# Patient Record
Sex: FEMALE | Race: WHITE | NOT HISPANIC OR LATINO
[De-identification: names, ages, dates, MRNs, and addresses within clinical notes are randomized per-mention and may not be internally consistent; named-entity substitution may affect disease eponyms.]

---

## 2017-11-14 ENCOUNTER — APPOINTMENT (OUTPATIENT)
Dept: FAMILY MEDICINE | Facility: CLINIC | Age: 57
End: 2017-11-14
Payer: COMMERCIAL

## 2017-11-14 VITALS
RESPIRATION RATE: 16 BRPM | HEART RATE: 66 BPM | BODY MASS INDEX: 34.1 KG/M2 | WEIGHT: 225 LBS | HEIGHT: 68 IN | OXYGEN SATURATION: 98 % | SYSTOLIC BLOOD PRESSURE: 152 MMHG | DIASTOLIC BLOOD PRESSURE: 90 MMHG

## 2017-11-14 VITALS — DIASTOLIC BLOOD PRESSURE: 80 MMHG | SYSTOLIC BLOOD PRESSURE: 138 MMHG

## 2017-11-14 DIAGNOSIS — Z82.3 FAMILY HISTORY OF STROKE: ICD-10-CM

## 2017-11-14 DIAGNOSIS — Z82.49 FAMILY HISTORY OF ISCHEMIC HEART DISEASE AND OTHER DISEASES OF THE CIRCULATORY SYSTEM: ICD-10-CM

## 2017-11-14 DIAGNOSIS — Z92.89 PERSONAL HISTORY OF OTHER MEDICAL TREATMENT: ICD-10-CM

## 2017-11-14 DIAGNOSIS — Z83.79 FAMILY HISTORY OF OTHER DISEASES OF THE DIGESTIVE SYSTEM: ICD-10-CM

## 2017-11-14 LAB — CYTOLOGY CVX/VAG DOC THIN PREP: NEGATIVE

## 2017-11-14 PROCEDURE — 99213 OFFICE O/P EST LOW 20 MIN: CPT

## 2017-11-20 LAB
HPV MRNA E6/E7: NOT DETECTED
SUREPATH (TM) PAP: NORMAL

## 2018-02-20 ENCOUNTER — APPOINTMENT (OUTPATIENT)
Dept: FAMILY MEDICINE | Facility: CLINIC | Age: 58
End: 2018-02-20
Payer: COMMERCIAL

## 2018-02-20 VITALS
BODY MASS INDEX: 35.16 KG/M2 | SYSTOLIC BLOOD PRESSURE: 138 MMHG | HEART RATE: 58 BPM | RESPIRATION RATE: 16 BRPM | HEIGHT: 68 IN | WEIGHT: 232 LBS | DIASTOLIC BLOOD PRESSURE: 90 MMHG | OXYGEN SATURATION: 98 %

## 2018-02-20 VITALS — DIASTOLIC BLOOD PRESSURE: 72 MMHG | SYSTOLIC BLOOD PRESSURE: 128 MMHG

## 2018-02-20 PROCEDURE — 99213 OFFICE O/P EST LOW 20 MIN: CPT

## 2018-04-20 ENCOUNTER — APPOINTMENT (OUTPATIENT)
Dept: FAMILY MEDICINE | Facility: CLINIC | Age: 58
End: 2018-04-20
Payer: COMMERCIAL

## 2018-04-20 VITALS
BODY MASS INDEX: 33.71 KG/M2 | HEIGHT: 68.5 IN | HEART RATE: 72 BPM | OXYGEN SATURATION: 98 % | TEMPERATURE: 99 F | SYSTOLIC BLOOD PRESSURE: 118 MMHG | DIASTOLIC BLOOD PRESSURE: 78 MMHG | RESPIRATION RATE: 16 BRPM | WEIGHT: 225 LBS

## 2018-04-20 PROCEDURE — 90471 IMMUNIZATION ADMIN: CPT

## 2018-04-20 PROCEDURE — 90715 TDAP VACCINE 7 YRS/> IM: CPT

## 2018-04-20 RX ORDER — LOSARTAN POTASSIUM 100 MG/1
100 TABLET, FILM COATED ORAL
Qty: 30 | Refills: 0 | Status: COMPLETED | COMMUNITY
Start: 2018-02-20

## 2018-04-20 RX ORDER — BISOPROLOL FUMARATE AND HYDROCHLOROTHIAZIDE 10; 6.25 MG/1; MG/1
10-6.25 TABLET, COATED ORAL
Refills: 0 | Status: COMPLETED | COMMUNITY
End: 2018-04-20

## 2018-07-28 ENCOUNTER — RX RENEWAL (OUTPATIENT)
Age: 58
End: 2018-07-28

## 2018-07-28 ENCOUNTER — APPOINTMENT (OUTPATIENT)
Dept: FAMILY MEDICINE | Facility: CLINIC | Age: 58
End: 2018-07-28
Payer: COMMERCIAL

## 2018-07-28 VITALS
OXYGEN SATURATION: 98 % | HEART RATE: 58 BPM | BODY MASS INDEX: 33.71 KG/M2 | SYSTOLIC BLOOD PRESSURE: 116 MMHG | WEIGHT: 225 LBS | DIASTOLIC BLOOD PRESSURE: 76 MMHG | HEIGHT: 68.5 IN

## 2018-07-28 VITALS — SYSTOLIC BLOOD PRESSURE: 138 MMHG | DIASTOLIC BLOOD PRESSURE: 70 MMHG

## 2018-07-28 PROCEDURE — 99213 OFFICE O/P EST LOW 20 MIN: CPT

## 2018-07-28 RX ORDER — BISOPROLOL FUMARATE AND HYDROCHLOROTHIAZIDE 10; 6.25 MG/1; MG/1
10-6.25 TABLET, FILM COATED ORAL DAILY
Qty: 30 | Refills: 2 | Status: DISCONTINUED | COMMUNITY
Start: 2018-02-20 | End: 2018-07-28

## 2018-07-28 NOTE — HISTORY OF PRESENT ILLNESS
[FreeTextEntry1] : med check [de-identified] : She is feeling very well.  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

## 2018-07-28 NOTE — ASSESSMENT
[FreeTextEntry1] : her hypertension is stable, she will continue current meds, healthy diet and exercise, rx's given for labs, mammogram and screening colonoscopy, she will follow up in 3 months or sooner prn

## 2018-07-30 ENCOUNTER — RESULT REVIEW (OUTPATIENT)
Age: 58
End: 2018-07-30

## 2018-07-31 ENCOUNTER — RESULT REVIEW (OUTPATIENT)
Age: 58
End: 2018-07-31

## 2018-08-28 ENCOUNTER — RESULT CHARGE (OUTPATIENT)
Age: 58
End: 2018-08-28

## 2018-08-29 ENCOUNTER — NON-APPOINTMENT (OUTPATIENT)
Age: 58
End: 2018-08-29

## 2018-08-29 ENCOUNTER — APPOINTMENT (OUTPATIENT)
Dept: FAMILY MEDICINE | Facility: CLINIC | Age: 58
End: 2018-08-29
Payer: COMMERCIAL

## 2018-08-29 VITALS
HEIGHT: 66 IN | WEIGHT: 231 LBS | TEMPERATURE: 98.3 F | OXYGEN SATURATION: 98 % | SYSTOLIC BLOOD PRESSURE: 138 MMHG | DIASTOLIC BLOOD PRESSURE: 84 MMHG | HEART RATE: 60 BPM | RESPIRATION RATE: 16 BRPM | BODY MASS INDEX: 37.12 KG/M2

## 2018-08-29 LAB
BILIRUB UR QL STRIP: NORMAL
CLARITY UR: CLEAR
COLLECTION METHOD: NORMAL
GLUCOSE UR-MCNC: NORMAL
HCG UR QL: 0.2 EU/DL
HGB UR QL STRIP.AUTO: NORMAL
KETONES UR-MCNC: NORMAL
LEUKOCYTE ESTERASE UR QL STRIP: NORMAL
NITRITE UR QL STRIP: NORMAL
PH UR STRIP: 5.5
PROT UR STRIP-MCNC: NORMAL
SP GR UR STRIP: 1.01

## 2018-08-29 PROCEDURE — 81003 URINALYSIS AUTO W/O SCOPE: CPT | Mod: QW

## 2018-08-29 PROCEDURE — 99396 PREV VISIT EST AGE 40-64: CPT | Mod: 25

## 2018-08-29 PROCEDURE — 93000 ELECTROCARDIOGRAM COMPLETE: CPT

## 2018-08-29 NOTE — HEALTH RISK ASSESSMENT
[Very Good] : ~his/her~ current health as very good [Excellent] : ~his/her~  mood as  excellent [No falls in past year] : Patient reported no falls in the past year [0] : 2) Feeling down, depressed, or hopeless: Not at all (0) [Patient reported mammogram was normal] : Patient reported mammogram was normal [HIV test declined] : HIV test declined [Hepatitis C test declined] : Hepatitis C test declined [None] : None [With Significant Other] : lives with significant other [Employed] : employed [Single] : single [Feels Safe at Home] : Feels safe at home [Fully functional (bathing, dressing, toileting, transferring, walking, feeding)] : Fully functional (bathing, dressing, toileting, transferring, walking, feeding) [Fully functional (using the telephone, shopping, preparing meals, housekeeping, doing laundry, using] : Fully functional and needs no help or supervision to perform IADLs (using the telephone, shopping, preparing meals, housekeeping, doing laundry, using transportation, managing medications and managing finances) [Smoke Detector] : smoke detector [Carbon Monoxide Detector] : carbon monoxide detector [Seat Belt] :  uses seat belt [Sunscreen] : uses sunscreen [Discussed at today's visit] : Advance Directives Discussed at today's visit [] : No [[id number]] : 0 [Change in mental status noted] : No change in mental status noted [Language] : denies difficulty with language [Behavior] : denies difficulty with behavior [Learning/Retaining New Information] : denies difficulty learning/retaining new information [Handling Complex Tasks] : denies difficulty handling complex tasks [Reasoning] : denies difficulty with reasoning [Spatial Ability and Orientation] : denies difficulty with spatial ability and orientation [Reports changes in hearing] : Reports no changes in hearing [Reports changes in vision] : Reports no changes in vision [Reports changes in dental health] : Reports no changes in dental health [Guns at Home] : no guns at home [MammogramDate] : Aug2018 [PapSmearDate] : Aug2017 [ColonoscopyDate] : Aug2018

## 2018-08-29 NOTE — ASSESSMENT
[FreeTextEntry1] : UA/EKG reviewed with pt.  Diet/exercise reviewed.  All meds are UTD. She had lab work done and that was reviewed. Rx for BMD- she had a colonoscopy yesterday.

## 2018-08-29 NOTE — PHYSICAL EXAM
[No Acute Distress] : no acute distress [Well-Appearing] : well-appearing [Normal Sclera/Conjunctiva] : normal sclera/conjunctiva [PERRL] : pupils equal round and reactive to light [EOMI] : extraocular movements intact [Normal Outer Ear/Nose] : the outer ears and nose were normal in appearance [Normal Oropharynx] : the oropharynx was normal [No JVD] : no jugular venous distention [Supple] : supple [No Lymphadenopathy] : no lymphadenopathy [Thyroid Normal, No Nodules] : the thyroid was normal and there were no nodules present [No Respiratory Distress] : no respiratory distress  [Clear to Auscultation] : lungs were clear to auscultation bilaterally [Normal Rate] : normal rate  [Regular Rhythm] : with a regular rhythm [Normal S1, S2] : normal S1 and S2 [No Murmur] : no murmur heard [No Carotid Bruits] : no carotid bruits [No Varicosities] : no varicosities [Pedal Pulses Present] : the pedal pulses are present [No Edema] : there was no peripheral edema [No Extremity Clubbing/Cyanosis] : no extremity clubbing/cyanosis [Soft] : abdomen soft [Non Tender] : non-tender [Non-distended] : non-distended [No Masses] : no abdominal mass palpated [No HSM] : no HSM [Normal Bowel Sounds] : normal bowel sounds [Normal Posterior Cervical Nodes] : no posterior cervical lymphadenopathy [Normal Anterior Cervical Nodes] : no anterior cervical lymphadenopathy [No CVA Tenderness] : no CVA  tenderness [No Spinal Tenderness] : no spinal tenderness [No Joint Swelling] : no joint swelling [Grossly Normal Strength/Tone] : grossly normal strength/tone [No Rash] : no rash [Normal Gait] : normal gait [Coordination Grossly Intact] : coordination grossly intact [No Focal Deficits] : no focal deficits [Deep Tendon Reflexes (DTR)] : deep tendon reflexes were 2+ and symmetric [Speech Grossly Normal] : speech grossly normal [Memory Grossly Normal] : memory grossly normal [Normal Affect] : the affect was normal [Alert and Oriented x3] : oriented to person, place, and time [Normal Mood] : the mood was normal [Normal Insight/Judgement] : insight and judgment were intact [de-identified] : overweight

## 2018-10-15 ENCOUNTER — APPOINTMENT (OUTPATIENT)
Dept: FAMILY MEDICINE | Facility: CLINIC | Age: 58
End: 2018-10-15
Payer: COMMERCIAL

## 2018-10-15 VITALS
DIASTOLIC BLOOD PRESSURE: 82 MMHG | HEART RATE: 64 BPM | HEIGHT: 66 IN | BODY MASS INDEX: 36.8 KG/M2 | RESPIRATION RATE: 16 BRPM | WEIGHT: 229 LBS | OXYGEN SATURATION: 98 % | SYSTOLIC BLOOD PRESSURE: 128 MMHG

## 2018-10-15 DIAGNOSIS — Z23 ENCOUNTER FOR IMMUNIZATION: ICD-10-CM

## 2018-10-15 DIAGNOSIS — Z12.11 ENCOUNTER FOR SCREENING FOR MALIGNANT NEOPLASM OF COLON: ICD-10-CM

## 2018-10-15 DIAGNOSIS — R94.5 ABNORMAL RESULTS OF LIVER FUNCTION STUDIES: ICD-10-CM

## 2018-10-15 PROCEDURE — 99213 OFFICE O/P EST LOW 20 MIN: CPT

## 2018-10-15 NOTE — ASSESSMENT
[FreeTextEntry1] : no medication changes were made, she was advised to apply ice alternating with heat to her chest and to follow up if symptoms persist and in 3 months for a BP check

## 2018-10-15 NOTE — PHYSICAL EXAM
[No Acute Distress] : no acute distress [Well Nourished] : well nourished [Well Developed] : well developed [Well-Appearing] : well-appearing [Normal Voice/Communication] : normal voice/communication [No Respiratory Distress] : no respiratory distress  [Clear to Auscultation] : lungs were clear to auscultation bilaterally [No Accessory Muscle Use] : no accessory muscle use [Normal Rate] : normal rate  [Regular Rhythm] : with a regular rhythm [Normal S1, S2] : normal S1 and S2 [No Murmur] : no murmur heard [Normal Gait] : normal gait [Speech Grossly Normal] : speech grossly normal [Memory Grossly Normal] : memory grossly normal [Normal Affect] : the affect was normal [Normal Mood] : the mood was normal [Normal Insight/Judgement] : insight and judgment were intact [de-identified] : tenderness on palpation of right costochondral area

## 2018-10-15 NOTE — HISTORY OF PRESENT ILLNESS
[FreeTextEntry1] : Pt presents for bp check [de-identified] : She just closed on her new house and will move in soon.  She is feeling well except for some discomfort in the right side of her chest and it radiates to the right arm when she moves the arm.

## 2019-01-15 ENCOUNTER — APPOINTMENT (OUTPATIENT)
Dept: FAMILY MEDICINE | Facility: CLINIC | Age: 59
End: 2019-01-15
Payer: COMMERCIAL

## 2019-01-15 VITALS — SYSTOLIC BLOOD PRESSURE: 126 MMHG | DIASTOLIC BLOOD PRESSURE: 66 MMHG

## 2019-01-15 VITALS
HEIGHT: 66 IN | DIASTOLIC BLOOD PRESSURE: 72 MMHG | HEART RATE: 76 BPM | SYSTOLIC BLOOD PRESSURE: 130 MMHG | OXYGEN SATURATION: 98 % | BODY MASS INDEX: 36.8 KG/M2 | WEIGHT: 229 LBS | RESPIRATION RATE: 16 BRPM

## 2019-01-15 DIAGNOSIS — S29.011A STRAIN OF MUSCLE AND TENDON OF FRONT WALL OF THORAX, INITIAL ENCOUNTER: ICD-10-CM

## 2019-01-15 PROCEDURE — 99213 OFFICE O/P EST LOW 20 MIN: CPT

## 2019-01-15 NOTE — HISTORY OF PRESENT ILLNESS
[FreeTextEntry1] : Patient present for 3 month follow up and med renewal  [de-identified] : She had been having tingling in the thumb, index and middle fingers of both hands especially at night.  She has been eating healthy and walking for exercising.  She has a neurology follow up appointment tomorrow with Dr Bolden

## 2019-01-15 NOTE — PHYSICAL EXAM
[No Acute Distress] : no acute distress [Well Nourished] : well nourished [Well Developed] : well developed [Well-Appearing] : well-appearing [Normal Voice/Communication] : normal voice/communication [No Respiratory Distress] : no respiratory distress  [Clear to Auscultation] : lungs were clear to auscultation bilaterally [No Accessory Muscle Use] : no accessory muscle use [Normal Rate] : normal rate  [Regular Rhythm] : with a regular rhythm [Normal S1, S2] : normal S1 and S2 [Speech Grossly Normal] : speech grossly normal [Memory Grossly Normal] : memory grossly normal [Normal Mood] : the mood was normal [Normal Insight/Judgement] : insight and judgment were intact

## 2019-03-05 ENCOUNTER — RX RENEWAL (OUTPATIENT)
Age: 59
End: 2019-03-05

## 2019-04-02 ENCOUNTER — APPOINTMENT (OUTPATIENT)
Dept: FAMILY MEDICINE | Facility: CLINIC | Age: 59
End: 2019-04-02
Payer: COMMERCIAL

## 2019-04-02 VITALS
HEIGHT: 66 IN | OXYGEN SATURATION: 98 % | BODY MASS INDEX: 36.96 KG/M2 | DIASTOLIC BLOOD PRESSURE: 80 MMHG | RESPIRATION RATE: 16 BRPM | WEIGHT: 230 LBS | SYSTOLIC BLOOD PRESSURE: 124 MMHG | HEART RATE: 66 BPM

## 2019-04-02 PROCEDURE — 99396 PREV VISIT EST AGE 40-64: CPT | Mod: 25

## 2019-04-02 NOTE — PHYSICAL EXAM
[No Acute Distress] : no acute distress [Well Nourished] : well nourished [Well Developed] : well developed [Well-Appearing] : well-appearing [Normal Voice/Communication] : normal voice/communication [Normal Appearance] : normal in appearance [No Masses] : no palpable masses [No Nipple Discharge] : no nipple discharge [Urethral Meatus] : normal urethra [Urinary Bladder Findings] : the bladder was normal on palpation [External Female Genitalia] : normal external genitalia [Vagina] : normal vaginal exam [Cervix] : normal cervix [Uterus] : uterus was normal size, without masses or tenderness [Uterine Adnexae] : normal adnexa

## 2019-04-02 NOTE — PLAN
[FreeTextEntry1] : PAP sent to labs, she will follow up for a wellness visit and check labs prior, she was given rx's for a dexa and mammo

## 2019-04-06 LAB — CYTOLOGY CVX/VAG DOC THIN PREP: NORMAL

## 2019-06-28 ENCOUNTER — APPOINTMENT (OUTPATIENT)
Dept: FAMILY MEDICINE | Facility: CLINIC | Age: 59
End: 2019-06-28
Payer: COMMERCIAL

## 2019-06-28 VITALS — DIASTOLIC BLOOD PRESSURE: 80 MMHG | SYSTOLIC BLOOD PRESSURE: 140 MMHG

## 2019-06-28 VITALS
HEART RATE: 64 BPM | OXYGEN SATURATION: 98 % | DIASTOLIC BLOOD PRESSURE: 100 MMHG | BODY MASS INDEX: 36.32 KG/M2 | RESPIRATION RATE: 14 BRPM | WEIGHT: 226 LBS | HEIGHT: 66 IN | SYSTOLIC BLOOD PRESSURE: 160 MMHG

## 2019-06-28 PROCEDURE — 99213 OFFICE O/P EST LOW 20 MIN: CPT

## 2019-06-28 NOTE — PLAN
[FreeTextEntry1] : she was advised to apply cool compresses and to take the steroid pack as prescribed and to follow up with dermatology if no improvement in 10-14 days

## 2019-06-28 NOTE — PHYSICAL EXAM
[No Acute Distress] : no acute distress [Well Nourished] : well nourished [Well Developed] : well developed [Well-Appearing] : well-appearing [Normal Voice/Communication] : normal voice/communication [de-identified] : pink, slightly raised areas on left upper and lower eyelids, no vesicles seen [de-identified] : she is anxious

## 2019-06-28 NOTE — HISTORY OF PRESENT ILLNESS
[FreeTextEntry8] : pr c/o rash around the eye, +crusty in the morning , +itchy X 3 weeks.  She had her eyebrows waxed about a month ago and 1 week later developed an itchy rash around the left eye and it is coming and going since.  She is applying cool compresses and tried vitamin E cream. She is under a lot of stress, her boyfriend has a problem with alcohol and she just had to call the police to come to the house and now she is under increased financial stress and her dog is sick.

## 2019-08-22 ENCOUNTER — RESULT REVIEW (OUTPATIENT)
Age: 59
End: 2019-08-22

## 2019-09-18 ENCOUNTER — APPOINTMENT (OUTPATIENT)
Dept: FAMILY MEDICINE | Facility: CLINIC | Age: 59
End: 2019-09-18
Payer: COMMERCIAL

## 2019-09-18 ENCOUNTER — NON-APPOINTMENT (OUTPATIENT)
Age: 59
End: 2019-09-18

## 2019-09-18 VITALS
RESPIRATION RATE: 14 BRPM | DIASTOLIC BLOOD PRESSURE: 78 MMHG | WEIGHT: 226 LBS | OXYGEN SATURATION: 98 % | HEART RATE: 62 BPM | HEIGHT: 66 IN | BODY MASS INDEX: 36.32 KG/M2 | SYSTOLIC BLOOD PRESSURE: 140 MMHG

## 2019-09-18 VITALS — DIASTOLIC BLOOD PRESSURE: 76 MMHG | SYSTOLIC BLOOD PRESSURE: 136 MMHG

## 2019-09-18 DIAGNOSIS — H02.9 UNSPECIFIED DISORDER OF EYELID: ICD-10-CM

## 2019-09-18 LAB
BILIRUB UR QL STRIP: NEGATIVE
CLARITY UR: NORMAL
COLLECTION METHOD: NORMAL
GLUCOSE UR-MCNC: NEGATIVE
HCG UR QL: 0.2 EU/DL
HGB UR QL STRIP.AUTO: NORMAL
KETONES UR-MCNC: NEGATIVE
LEUKOCYTE ESTERASE UR QL STRIP: NORMAL
NITRITE UR QL STRIP: NEGATIVE
PH UR STRIP: 5
PROT UR STRIP-MCNC: NEGATIVE
SP GR UR STRIP: 1.02

## 2019-09-18 PROCEDURE — 81003 URINALYSIS AUTO W/O SCOPE: CPT | Mod: NC,QW

## 2019-09-18 PROCEDURE — 99396 PREV VISIT EST AGE 40-64: CPT | Mod: 25

## 2019-09-18 PROCEDURE — 93000 ELECTROCARDIOGRAM COMPLETE: CPT

## 2019-09-18 RX ORDER — METHYLPREDNISOLONE 4 MG/1
4 TABLET ORAL
Qty: 1 | Refills: 0 | Status: DISCONTINUED | COMMUNITY
Start: 2019-06-28 | End: 2019-09-18

## 2019-09-18 NOTE — PHYSICAL EXAM
[No Acute Distress] : no acute distress [Well Nourished] : well nourished [Well Developed] : well developed [Well-Appearing] : well-appearing [Normal Voice/Communication] : normal voice/communication [Normal Sclera/Conjunctiva] : normal sclera/conjunctiva [Normal Outer Ear/Nose] : the outer ears and nose were normal in appearance [Normal TMs] : both tympanic membranes were normal [Normal Oropharynx] : the oropharynx was normal [No Lymphadenopathy] : no lymphadenopathy [Supple] : supple [Thyroid Normal, No Nodules] : the thyroid was normal and there were no nodules present [No Respiratory Distress] : no respiratory distress  [No Accessory Muscle Use] : no accessory muscle use [Clear to Auscultation] : lungs were clear to auscultation bilaterally [Normal Rate] : normal rate  [Regular Rhythm] : with a regular rhythm [Normal S1, S2] : normal S1 and S2 [No Murmur] : no murmur heard [No Carotid Bruits] : no carotid bruits [No Edema] : there was no peripheral edema [Soft] : abdomen soft [Non-distended] : non-distended [Non Tender] : non-tender [No HSM] : no HSM [Normal Bowel Sounds] : normal bowel sounds [Memory Grossly Normal] : memory grossly normal [Speech Grossly Normal] : speech grossly normal [Normal Mood] : the mood was normal [Normal Affect] : the affect was normal [Normal Insight/Judgement] : insight and judgment were intact

## 2019-09-18 NOTE — HISTORY OF PRESENT ILLNESS
[FreeTextEntry1] : Patient present for physical\par  [de-identified] : She received a series of steroid injections in both wrists for carpal tunnel syndrome and she is on a course of prednisone.  She denies any burning with urination but sometimes her urine has a foul odor.

## 2019-09-18 NOTE — PLAN
[FreeTextEntry1] : she was given new rx's for her labs and her dexa scan, her urine will be sent to the lab for a U/A with micro and C&S

## 2019-09-18 NOTE — HEALTH RISK ASSESSMENT
[Very Good] : ~his/her~  mood as very good [0-5] : 0-5 [Never (0 pts)] : Never (0 points) [No] : In the past 12 months have you used drugs other than those required for medical reasons? No [No falls in past year] : Patient reported no falls in the past year [Hepatitis C test declined] : Hepatitis C test declined [HIV test declined] : HIV test declined [None] : None [Alone] : lives alone [Employed] : employed [] :  [Fully functional (bathing, dressing, toileting, transferring, walking, feeding)] : Fully functional (bathing, dressing, toileting, transferring, walking, feeding) [Feels Safe at Home] : Feels safe at home [Fully functional (using the telephone, shopping, preparing meals, housekeeping, doing laundry, using] : Fully functional and needs no help or supervision to perform IADLs (using the telephone, shopping, preparing meals, housekeeping, doing laundry, using transportation, managing medications and managing finances) [Reports normal functional visual acuity (ie: able to read med bottle)] : Reports normal functional visual acuity [Smoke Detector] : smoke detector [Seat Belt] :  uses seat belt [Safety elements used in home] : safety elements used in home [Sunscreen] : uses sunscreen [Patient/Caregiver not ready to engage] : Patient/Caregiver not ready to engage [FreeTextEntry1] : none [] : No [de-identified] : none [de-identified] : Dr Valverde (Ortho), Dr Bolden (Neurology) [Audit-CScore] : 0 [de-identified] : exercises regularly [de-identified] : healthy diet [Change in mental status noted] : No change in mental status noted [Language] : denies difficulty with language [Behavior] : denies difficulty with behavior [Learning/Retaining New Information] : denies difficulty learning/retaining new information [Handling Complex Tasks] : denies difficulty handling complex tasks [Reasoning] : denies difficulty with reasoning [Spatial Ability and Orientation] : denies difficulty with spatial ability and orientation [Sexually Active] : not sexually active [Reports changes in hearing] : Reports no changes in hearing [Reports changes in dental health] : Reports no changes in dental health [Reports changes in vision] : Reports no changes in vision [Carbon Monoxide Detector] : no carbon monoxide detector [Guns at Home] : no guns at home [Travel to Developing Areas] : does not  travel to developing areas [TB Exposure] : is not being exposed to tuberculosis [Caregiver Concerns] : does not have caregiver concerns [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

## 2019-09-19 LAB
APPEARANCE: CLEAR
BACTERIA UR CULT: NORMAL
BACTERIA: NEGATIVE
BILIRUBIN URINE: NEGATIVE
BLOOD URINE: ABNORMAL
COLOR: YELLOW
GLUCOSE QUALITATIVE U: NEGATIVE
HYALINE CASTS: 1 /LPF
KETONES URINE: NEGATIVE
LEUKOCYTE ESTERASE URINE: ABNORMAL
MICROSCOPIC-UA: NORMAL
NITRITE URINE: NEGATIVE
PH URINE: 5.5
PROTEIN URINE: NEGATIVE
RED BLOOD CELLS URINE: 4 /HPF
SPECIFIC GRAVITY URINE: 1.02
SQUAMOUS EPITHELIAL CELLS: 3 /HPF
UROBILINOGEN URINE: NORMAL
WHITE BLOOD CELLS URINE: 12 /HPF

## 2019-09-24 ENCOUNTER — CLINICAL ADVICE (OUTPATIENT)
Age: 59
End: 2019-09-24

## 2019-09-25 ENCOUNTER — RESULT REVIEW (OUTPATIENT)
Age: 59
End: 2019-09-25

## 2019-12-19 ENCOUNTER — APPOINTMENT (OUTPATIENT)
Dept: FAMILY MEDICINE | Facility: CLINIC | Age: 59
End: 2019-12-19

## 2020-01-16 ENCOUNTER — APPOINTMENT (OUTPATIENT)
Dept: FAMILY MEDICINE | Facility: CLINIC | Age: 60
End: 2020-01-16
Payer: COMMERCIAL

## 2020-01-16 VITALS
HEIGHT: 66 IN | HEART RATE: 62 BPM | TEMPERATURE: 97.9 F | BODY MASS INDEX: 36.48 KG/M2 | SYSTOLIC BLOOD PRESSURE: 126 MMHG | WEIGHT: 227 LBS | DIASTOLIC BLOOD PRESSURE: 90 MMHG | OXYGEN SATURATION: 96 % | RESPIRATION RATE: 14 BRPM

## 2020-01-16 LAB
BILIRUB UR QL STRIP: NORMAL
GLUCOSE UR-MCNC: NORMAL
HCG UR QL: 0.2 EU/DL
HGB UR QL STRIP.AUTO: ABNORMAL
KETONES UR-MCNC: NORMAL
LEUKOCYTE ESTERASE UR QL STRIP: ABNORMAL
NITRITE UR QL STRIP: NORMAL
PH UR STRIP: 7
PROT UR STRIP-MCNC: NORMAL
SP GR UR STRIP: 1.01

## 2020-01-16 PROCEDURE — 99213 OFFICE O/P EST LOW 20 MIN: CPT | Mod: 25

## 2020-01-16 PROCEDURE — 81003 URINALYSIS AUTO W/O SCOPE: CPT | Mod: NC,QW

## 2020-01-16 NOTE — HISTORY OF PRESENT ILLNESS
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 I can hold my urine for hours. as soon as i come home I have urgency when i urinate.I sometimes trickle and don’t make it  I am not sexually active. I have a little redness on the outside of my vagina.I don’t shave  I drink water. I do drink coffee. I have annual pap. I bought a house\par

## 2020-01-16 NOTE — PHYSICAL EXAM
[de-identified] : orifice atrophy and red [Normal] : normal rate, regular rhythm, normal S1 and S2 and no murmur heard

## 2020-01-20 LAB — BACTERIA UR CULT: NORMAL

## 2020-01-30 ENCOUNTER — APPOINTMENT (OUTPATIENT)
Dept: FAMILY MEDICINE | Facility: CLINIC | Age: 60
End: 2020-01-30
Payer: COMMERCIAL

## 2020-01-30 VITALS
BODY MASS INDEX: 35.68 KG/M2 | HEIGHT: 66 IN | OXYGEN SATURATION: 98 % | RESPIRATION RATE: 14 BRPM | SYSTOLIC BLOOD PRESSURE: 170 MMHG | HEART RATE: 63 BPM | DIASTOLIC BLOOD PRESSURE: 100 MMHG | WEIGHT: 222 LBS

## 2020-01-30 VITALS — SYSTOLIC BLOOD PRESSURE: 160 MMHG | DIASTOLIC BLOOD PRESSURE: 90 MMHG

## 2020-01-30 DIAGNOSIS — Z87.898 PERSONAL HISTORY OF OTHER SPECIFIED CONDITIONS: ICD-10-CM

## 2020-01-30 DIAGNOSIS — R31.21 ASYMPTOMATIC MICROSCOPIC HEMATURIA: ICD-10-CM

## 2020-01-30 DIAGNOSIS — L20.9 ATOPIC DERMATITIS, UNSPECIFIED: ICD-10-CM

## 2020-01-30 PROCEDURE — 99214 OFFICE O/P EST MOD 30 MIN: CPT

## 2020-01-30 NOTE — REVIEW OF SYSTEMS
[Recent Change In Weight] : ~T recent weight change [Itching] : Itching [Skin Rash] : skin rash [Anxiety] : anxiety [Negative] : Heme/Lymph [Chest Pain] : no chest pain [Palpitations] : no palpitations [Suicidal] : not suicidal [Depression] : no depression [FreeTextEntry3] : itching around right eye with redness [de-identified] : numbness in right wrist

## 2020-01-30 NOTE — PHYSICAL EXAM
[No Acute Distress] : no acute distress [Well Nourished] : well nourished [Well Developed] : well developed [Well-Appearing] : well-appearing [Normal Voice/Communication] : normal voice/communication [Supple] : supple [No Respiratory Distress] : no respiratory distress  [No Accessory Muscle Use] : no accessory muscle use [Clear to Auscultation] : lungs were clear to auscultation bilaterally [Normal Rate] : normal rate  [Regular Rhythm] : with a regular rhythm [Normal S1, S2] : normal S1 and S2 [No Edema] : there was no peripheral edema [Coordination Grossly Intact] : coordination grossly intact [Speech Grossly Normal] : speech grossly normal [Memory Grossly Normal] : memory grossly normal [Normal Affect] : the affect was normal [Normal Insight/Judgement] : insight and judgment were intact [de-identified] : erythema and mild swelling of right upper eyelid and below the eye [de-identified] : she is very anxious and tearful today

## 2020-01-30 NOTE — HISTORY OF PRESENT ILLNESS
[FreeTextEntry1] : patient presents for  blood pressure check\par patient c/o itchy eyes,+ red, + irritated, +itchy  X 2 days  [de-identified] : She has redness of the right upper eyelid again, she saw Dr Arenas in the past and he prescribed triamcinolone cream and it helped.  It started again yesterday morning.  She is under increased stress as her work hours changed and she is afraid that she won't be able to pay her bills.  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  She is feeling very emotional today.  She received a 2nd injection for the carpal tunnel and it didn't help and she is upset about it.

## 2020-01-30 NOTE — PLAN
[FreeTextEntry1] : she was given emotional support, her hypertension is unstable today but I left her medications unchanged as she is very anxious and it may be contributing to it, she will apply the cream to the eyelid as directed, I renewed medications and she will follow up in 2 weeks or sooner prn

## 2020-02-12 ENCOUNTER — APPOINTMENT (OUTPATIENT)
Dept: FAMILY MEDICINE | Facility: CLINIC | Age: 60
End: 2020-02-12

## 2020-02-12 ENCOUNTER — RX RENEWAL (OUTPATIENT)
Age: 60
End: 2020-02-12

## 2020-04-30 ENCOUNTER — MESSAGE (OUTPATIENT)
Age: 60
End: 2020-04-30

## 2020-05-05 ENCOUNTER — APPOINTMENT (OUTPATIENT)
Dept: DISASTER EMERGENCY | Facility: CLINIC | Age: 60
End: 2020-05-05

## 2020-05-06 LAB
SARS-COV-2 IGG SERPL IA-ACNC: <0.1 INDEX
SARS-COV-2 IGG SERPL QL IA: NEGATIVE

## 2020-08-24 ENCOUNTER — APPOINTMENT (OUTPATIENT)
Dept: FAMILY MEDICINE | Facility: CLINIC | Age: 60
End: 2020-08-24
Payer: COMMERCIAL

## 2020-08-24 VITALS
WEIGHT: 220 LBS | BODY MASS INDEX: 35.36 KG/M2 | OXYGEN SATURATION: 99 % | SYSTOLIC BLOOD PRESSURE: 130 MMHG | HEART RATE: 59 BPM | HEIGHT: 66 IN | DIASTOLIC BLOOD PRESSURE: 80 MMHG | RESPIRATION RATE: 15 BRPM

## 2020-08-24 VITALS — TEMPERATURE: 97.7 F

## 2020-08-24 PROCEDURE — 99213 OFFICE O/P EST LOW 20 MIN: CPT

## 2020-10-02 ENCOUNTER — NON-APPOINTMENT (OUTPATIENT)
Age: 60
End: 2020-10-02

## 2020-10-02 ENCOUNTER — APPOINTMENT (OUTPATIENT)
Dept: FAMILY MEDICINE | Facility: CLINIC | Age: 60
End: 2020-10-02
Payer: COMMERCIAL

## 2020-10-02 VITALS
SYSTOLIC BLOOD PRESSURE: 146 MMHG | WEIGHT: 226 LBS | BODY MASS INDEX: 36.32 KG/M2 | RESPIRATION RATE: 14 BRPM | HEART RATE: 85 BPM | OXYGEN SATURATION: 98 % | DIASTOLIC BLOOD PRESSURE: 100 MMHG | HEIGHT: 66 IN

## 2020-10-02 VITALS — TEMPERATURE: 97.9 F

## 2020-10-02 VITALS — DIASTOLIC BLOOD PRESSURE: 80 MMHG | SYSTOLIC BLOOD PRESSURE: 130 MMHG

## 2020-10-02 DIAGNOSIS — R35.0 FREQUENCY OF MICTURITION: ICD-10-CM

## 2020-10-02 LAB
BILIRUB UR QL STRIP: NEGATIVE
CLARITY UR: CLEAR
COLLECTION METHOD: NORMAL
GLUCOSE UR-MCNC: NEGATIVE
HCG UR QL: 0.2 EU/DL
HGB UR QL STRIP.AUTO: NORMAL
KETONES UR-MCNC: NEGATIVE
LEUKOCYTE ESTERASE UR QL STRIP: NORMAL
NITRITE UR QL STRIP: NEGATIVE
PH UR STRIP: 6
PROT UR STRIP-MCNC: NEGATIVE
SP GR UR STRIP: 1.02

## 2020-10-02 PROCEDURE — 93000 ELECTROCARDIOGRAM COMPLETE: CPT

## 2020-10-02 PROCEDURE — 99396 PREV VISIT EST AGE 40-64: CPT | Mod: 25

## 2020-10-02 PROCEDURE — G0008: CPT

## 2020-10-02 PROCEDURE — 81003 URINALYSIS AUTO W/O SCOPE: CPT | Mod: NC,QW

## 2020-10-02 PROCEDURE — 90686 IIV4 VACC NO PRSV 0.5 ML IM: CPT

## 2020-10-02 NOTE — HISTORY OF PRESENT ILLNESS
[FreeTextEntry1] : Patient presents for complete physical\par  [de-identified] : She has been having more trouble with the left hand and saw Dr Valverde and is going to schedule carpal tunnel surgery.  She sometimes does urinate often but also drinks a lot of water daily.

## 2020-10-02 NOTE — PHYSICAL EXAM
[No Acute Distress] : no acute distress [Well Nourished] : well nourished [Well Developed] : well developed [Well-Appearing] : well-appearing [Normal Voice/Communication] : normal voice/communication [Normal Sclera/Conjunctiva] : normal sclera/conjunctiva [Normal Outer Ear/Nose] : the outer ears and nose were normal in appearance [Normal Oropharynx] : the oropharynx was normal [Normal TMs] : both tympanic membranes were normal [No Lymphadenopathy] : no lymphadenopathy [Supple] : supple [No Respiratory Distress] : no respiratory distress  [No Accessory Muscle Use] : no accessory muscle use [Clear to Auscultation] : lungs were clear to auscultation bilaterally [Normal Rate] : normal rate  [Regular Rhythm] : with a regular rhythm [Normal S1, S2] : normal S1 and S2 [No Murmur] : no murmur heard [No Carotid Bruits] : no carotid bruits [No Edema] : there was no peripheral edema [Soft] : abdomen soft [Non Tender] : non-tender [Non-distended] : non-distended [No HSM] : no HSM [Normal Bowel Sounds] : normal bowel sounds [Coordination Grossly Intact] : coordination grossly intact [No Focal Deficits] : no focal deficits [Speech Grossly Normal] : speech grossly normal [Memory Grossly Normal] : memory grossly normal [Normal Affect] : the affect was normal [Normal Mood] : the mood was normal [Normal Insight/Judgement] : insight and judgment were intact

## 2020-10-02 NOTE — HEALTH RISK ASSESSMENT
[Good] : ~his/her~  mood as  good [0-5] : 0-5 [No] : In the past 12 months have you used drugs other than those required for medical reasons? No [No falls in past year] : Patient reported no falls in the past year [HIV test declined] : HIV test declined [Hepatitis C test declined] : Hepatitis C test declined [None] : None [Alone] : lives alone [] :  [Feels Safe at Home] : Feels safe at home [Fully functional (bathing, dressing, toileting, transferring, walking, feeding)] : Fully functional (bathing, dressing, toileting, transferring, walking, feeding) [Fully functional (using the telephone, shopping, preparing meals, housekeeping, doing laundry, using] : Fully functional and needs no help or supervision to perform IADLs (using the telephone, shopping, preparing meals, housekeeping, doing laundry, using transportation, managing medications and managing finances) [Reports normal functional visual acuity (ie: able to read med bottle)] : Reports normal functional visual acuity [Reports changes in dental health] : Reports changes in dental health [Smoke Detector] : smoke detector [Carbon Monoxide Detector] : carbon monoxide detector [Safety elements used in home] : safety elements used in home [Seat Belt] :  uses seat belt [Sunscreen] : uses sunscreen [Patient/Caregiver not ready to engage] : Patient/Caregiver not ready to engage [FreeTextEntry1] : none [] : No [de-identified] : none [de-identified] : Dr Valverde (Hand Surgeon) [de-identified] : walks [de-identified] : healthy [Sexually Active] : not sexually active [Reports changes in hearing] : Reports no changes in hearing [Reports changes in vision] : Reports no changes in vision [Guns at Home] : no guns at home [Travel to Developing Areas] : does not  travel to developing areas [TB Exposure] : is not being exposed to tuberculosis [Caregiver Concerns] : does not have caregiver concerns [MammogramDate] : 08/20 [ColonoscopyDate] : 08/18 [de-identified] : has loose caps [AdvancecareDate] : 10/20

## 2020-10-02 NOTE — PLAN
[FreeTextEntry1] : flu vaccine was given and she was given proof to bring to her employer, urine C&S was ordered, no medication changes were made, she will follow up for medical clearance

## 2020-10-05 LAB — BACTERIA UR CULT: NORMAL

## 2020-11-09 ENCOUNTER — APPOINTMENT (OUTPATIENT)
Dept: FAMILY MEDICINE | Facility: CLINIC | Age: 60
End: 2020-11-09
Payer: COMMERCIAL

## 2020-11-09 VITALS
HEART RATE: 63 BPM | RESPIRATION RATE: 14 BRPM | BODY MASS INDEX: 36.32 KG/M2 | WEIGHT: 226 LBS | HEIGHT: 66 IN | OXYGEN SATURATION: 98 % | SYSTOLIC BLOOD PRESSURE: 124 MMHG | DIASTOLIC BLOOD PRESSURE: 86 MMHG

## 2020-11-09 VITALS — TEMPERATURE: 98.5 F

## 2020-11-09 VITALS — TEMPERATURE: 98.4 F

## 2020-11-09 PROCEDURE — 99072 ADDL SUPL MATRL&STAF TM PHE: CPT

## 2020-11-09 PROCEDURE — 99214 OFFICE O/P EST MOD 30 MIN: CPT

## 2020-11-09 NOTE — CONSULT LETTER
[Dear  ___] : Dear  [unfilled], [Consult Closing:] : Thank you very much for allowing me to participate in the care of this patient.  If you have any questions, please do not hesitate to contact me. [FreeTextEntry1] : Patient medically cleared

## 2020-11-09 NOTE — PHYSICAL EXAM
[Normal] : no jugular venous distention, supple, no lymphadenopathy and the thyroid was normal and there were no nodules present [de-identified] : carpal tunnel

## 2020-11-09 NOTE — HISTORY OF PRESENT ILLNESS
[No Pertinent Cardiac History] : no history of aortic stenosis, atrial fibrillation, coronary artery disease, recent myocardial infarction, or implantable device/pacemaker [No Pertinent Pulmonary History] : no history of asthma, COPD, sleep apnea, or smoking [No Adverse Anesthesia Reaction] : no adverse anesthesia reaction in self or family member [(Patient denies any chest pain, claudication, dyspnea on exertion, orthopnea, palpitations or syncope)] : Patient denies any chest pain, claudication, dyspnea on exertion, orthopnea, palpitations or syncope [Chronic Anticoagulation] : no chronic anticoagulation [Chronic Kidney Disease] : no chronic kidney disease [Diabetes] : no diabetes [FreeTextEntry1] : carpal tunnel release [FreeTextEntry2] : 11/11/2020 [FreeTextEntry3] : Dr Fidencio Valverde [FreeTextEntry4] : Patient presents for medical clearance  [FreeTextEntry5] : first time undergoing anesthesia

## 2020-11-09 NOTE — ASSESSMENT
[High Risk Surgery - Intraperitoneal, Intrathoracic or Supringuinal Vascular Procedures] : High Risk Surgery - Intraperitoneal, Intrathoracic or Supringuinal Vascular Procedures - No (0) [Ischemic Heart Disease] : Ischemic Heart Disease - No (0) [Congestive Heart Failure] : Congestive Heart Failure - No (0) [Prior Cerebrovascular Accident or TIA] : Prior Cerebrovascular Accident or TIA - No (0) [Creatinine >= 2mg/dL (1 Point)] : Creatinine >= 2mg/dL - No (0) [Insulin-dependent Diabetic (1 Point)] : Insulin-dependent Diabetic - No (0) [0] : 0 , RCRI Class: I, Risk of Post-Op Cardiac Complications: 3.9%, 95% CI for Risk Estimate: 2.8% - 5.4% [Patient Optimized for Surgery] : Patient optimized for surgery [No Further Testing Recommended] : no further testing recommended [Continue medications as is] : Continue current medications [As per surgery] : as per surgery [FreeTextEntry4] : Patient medically cleared

## 2021-01-09 ENCOUNTER — APPOINTMENT (OUTPATIENT)
Dept: FAMILY MEDICINE | Facility: CLINIC | Age: 61
End: 2021-01-09
Payer: COMMERCIAL

## 2021-01-09 VITALS
TEMPERATURE: 97.5 F | WEIGHT: 218 LBS | DIASTOLIC BLOOD PRESSURE: 80 MMHG | HEIGHT: 66 IN | HEART RATE: 59 BPM | BODY MASS INDEX: 35.03 KG/M2 | RESPIRATION RATE: 14 BRPM | OXYGEN SATURATION: 98 % | SYSTOLIC BLOOD PRESSURE: 124 MMHG

## 2021-01-09 VITALS — SYSTOLIC BLOOD PRESSURE: 130 MMHG | DIASTOLIC BLOOD PRESSURE: 70 MMHG

## 2021-01-09 DIAGNOSIS — G56.02 CARPAL TUNNEL SYNDROME, LEFT UPPER LIMB: ICD-10-CM

## 2021-01-09 DIAGNOSIS — Z92.29 PERSONAL HISTORY OF OTHER DRUG THERAPY: ICD-10-CM

## 2021-01-09 PROCEDURE — 99214 OFFICE O/P EST MOD 30 MIN: CPT

## 2021-01-09 PROCEDURE — 99072 ADDL SUPL MATRL&STAF TM PHE: CPT

## 2021-01-09 NOTE — PLAN
[FreeTextEntry1] : she was advised to apply heat to the neck area and to do stretching exercises, she will take the cyclobenzaprine as needed but only when home and never when working or if she needs to drive, she was advised of the medication side effects, her hypertension is stable and her medications were renewed, her anxiety is a little worse but not needing medication at this point, she was advised to call in 7-10 days for a PT rx if the neck pain persists or worsens

## 2021-01-09 NOTE — HISTORY OF PRESENT ILLNESS
[FreeTextEntry1] : patient presents for blood pressure check \par pt c/p pain on the back of the head X 4 days \par  [de-identified] : The carpal tunnel release surgery went well.  For the past few days she has had pain in the back of her neck.  The pain radiates to the back of her head.  The pain is worse when lying down and when turning her head. She was seen at [facility], a rapid covid swab was negative and a PCR is pending.  No medications were prescribed for her neck.  Her BP was fine at that visit.  She hasn't been taking any OTC tx for the pain.  She is stressed due to work and Covid.  She hasn't applied ice or heat to the neck.  She hasn't had any pain in her arms or numbness in her arms.  She is scheduled for her Covid vaccine today.

## 2021-01-09 NOTE — REVIEW OF SYSTEMS
[Headache] : headache [Anxiety] : anxiety [Negative] : Heme/Lymph [Fever] : no fever [Chills] : no chills [Suicidal] : not suicidal [Depression] : no depression [FreeTextEntry9] : neck pain radiating into her head

## 2021-01-09 NOTE — PHYSICAL EXAM
[Well Nourished] : well nourished [No Acute Distress] : no acute distress [Well Developed] : well developed [Well-Appearing] : well-appearing [Normal Voice/Communication] : normal voice/communication [Supple] : supple [No Accessory Muscle Use] : no accessory muscle use [No Respiratory Distress] : no respiratory distress  [Clear to Auscultation] : lungs were clear to auscultation bilaterally [Normal Rate] : normal rate  [Regular Rhythm] : with a regular rhythm [Normal S1, S2] : normal S1 and S2 [No Carotid Bruits] : no carotid bruits [No Edema] : there was no peripheral edema [Coordination Grossly Intact] : coordination grossly intact [No Focal Deficits] : no focal deficits [Speech Grossly Normal] : speech grossly normal [Memory Grossly Normal] : memory grossly normal [Normal Affect] : the affect was normal [Normal Insight/Judgement] : insight and judgment were intact [de-identified] : tenderness on palpation of posterior neck, decreased flexion, extension, rotation [de-identified] : she is anxious

## 2021-04-10 ENCOUNTER — APPOINTMENT (OUTPATIENT)
Dept: FAMILY MEDICINE | Facility: CLINIC | Age: 61
End: 2021-04-10
Payer: COMMERCIAL

## 2021-04-10 VITALS
SYSTOLIC BLOOD PRESSURE: 126 MMHG | HEART RATE: 70 BPM | WEIGHT: 225 LBS | BODY MASS INDEX: 36.16 KG/M2 | RESPIRATION RATE: 12 BRPM | OXYGEN SATURATION: 98 % | DIASTOLIC BLOOD PRESSURE: 80 MMHG | HEIGHT: 66 IN

## 2021-04-10 VITALS — TEMPERATURE: 97.2 F

## 2021-04-10 PROCEDURE — 99213 OFFICE O/P EST LOW 20 MIN: CPT

## 2021-04-10 PROCEDURE — 99072 ADDL SUPL MATRL&STAF TM PHE: CPT

## 2021-04-10 RX ORDER — CYCLOBENZAPRINE HYDROCHLORIDE 5 MG/1
5 TABLET, FILM COATED ORAL
Qty: 30 | Refills: 0 | Status: DISCONTINUED | COMMUNITY
Start: 2021-01-09 | End: 2021-04-10

## 2021-04-10 NOTE — PHYSICAL EXAM
[No Acute Distress] : no acute distress [Well Nourished] : well nourished [Well Developed] : well developed [Well-Appearing] : well-appearing [Normal Voice/Communication] : normal voice/communication [Supple] : supple [No Respiratory Distress] : no respiratory distress  [No Accessory Muscle Use] : no accessory muscle use [Clear to Auscultation] : lungs were clear to auscultation bilaterally [Normal Rate] : normal rate  [Regular Rhythm] : with a regular rhythm [Normal S1, S2] : normal S1 and S2 [No Murmur] : no murmur heard [No Carotid Bruits] : no carotid bruits [Coordination Grossly Intact] : coordination grossly intact [Speech Grossly Normal] : speech grossly normal

## 2021-04-10 NOTE — PLAN
[FreeTextEntry1] : her conditions are stable and no medication changes were made, she will go for the CT as scheduled and follow up for a PAP and med check appointment, she was given emotional support

## 2021-04-10 NOTE — HISTORY OF PRESENT ILLNESS
[FreeTextEntry1] : pt presents for med follow up  [de-identified] : She continued to have neck pain and saw Dr Bolden who ordered a CT which she is scheduled for.  She is under increased financial stress and is having a strained relationship with her daughter who is expecting her 2nd baby.  She loves watching her grandson.

## 2021-05-27 ENCOUNTER — APPOINTMENT (OUTPATIENT)
Dept: FAMILY MEDICINE | Facility: CLINIC | Age: 61
End: 2021-05-27
Payer: COMMERCIAL

## 2021-05-27 VITALS
BODY MASS INDEX: 35.36 KG/M2 | OXYGEN SATURATION: 64 % | TEMPERATURE: 98 F | SYSTOLIC BLOOD PRESSURE: 162 MMHG | DIASTOLIC BLOOD PRESSURE: 90 MMHG | HEIGHT: 66 IN | HEART RATE: 98 BPM | RESPIRATION RATE: 14 BRPM | WEIGHT: 220 LBS

## 2021-05-27 VITALS — SYSTOLIC BLOOD PRESSURE: 154 MMHG | DIASTOLIC BLOOD PRESSURE: 76 MMHG

## 2021-05-27 DIAGNOSIS — Z01.419 ENCOUNTER FOR GYNECOLOGICAL EXAMINATION (GENERAL) (ROUTINE) W/OUT ABNORMAL FINDINGS: ICD-10-CM

## 2021-05-27 PROCEDURE — 99072 ADDL SUPL MATRL&STAF TM PHE: CPT

## 2021-05-27 PROCEDURE — 99396 PREV VISIT EST AGE 40-64: CPT | Mod: 25

## 2021-05-27 NOTE — PHYSICAL EXAM
[No Acute Distress] : no acute distress [Well Nourished] : well nourished [Well Developed] : well developed [Well-Appearing] : well-appearing [Normal Voice/Communication] : normal voice/communication [Normal Appearance] : normal in appearance [No Masses] : no palpable masses [No Nipple Discharge] : no nipple discharge [No Axillary Lymphadenopathy] : no axillary lymphadenopathy [Urethral Meatus] : normal urethra [External Female Genitalia] : normal external genitalia [Urinary Bladder Findings] : the bladder was normal on palpation [Vagina] : normal vaginal exam [Uterus] : uterus was normal size, without masses or tenderness [Uterine Adnexae] : normal adnexa [Anus Abnormality] : the anus and perineum were normal [FreeTextEntry1] : very small cervical os, no lesions seen [de-identified] : she is upset and crying

## 2021-05-27 NOTE — PLAN
[FreeTextEntry1] : PAP was obtained and will be sent to [facility] for analysis, she was given emotional support and will follow up for a wellness visit

## 2021-05-27 NOTE — HISTORY OF PRESENT ILLNESS
[FreeTextEntry1] : pt presents for pap [de-identified] : She is very upset today, she is under increased stress at work and her daughter just had another baby and she watches her and her grandson.  She doesn't get any time to herself.

## 2021-06-02 LAB — CYTOLOGY CVX/VAG DOC THIN PREP: NORMAL

## 2021-07-30 ENCOUNTER — NON-APPOINTMENT (OUTPATIENT)
Age: 61
End: 2021-07-30

## 2021-07-30 ENCOUNTER — APPOINTMENT (OUTPATIENT)
Dept: FAMILY MEDICINE | Facility: CLINIC | Age: 61
End: 2021-07-30
Payer: COMMERCIAL

## 2021-07-30 VITALS — SYSTOLIC BLOOD PRESSURE: 120 MMHG | DIASTOLIC BLOOD PRESSURE: 76 MMHG

## 2021-07-30 VITALS
WEIGHT: 220 LBS | BODY MASS INDEX: 35.36 KG/M2 | HEART RATE: 64 BPM | SYSTOLIC BLOOD PRESSURE: 140 MMHG | OXYGEN SATURATION: 98 % | TEMPERATURE: 98.3 F | RESPIRATION RATE: 14 BRPM | DIASTOLIC BLOOD PRESSURE: 90 MMHG | HEIGHT: 66 IN

## 2021-07-30 PROCEDURE — 99214 OFFICE O/P EST MOD 30 MIN: CPT

## 2021-10-05 ENCOUNTER — NON-APPOINTMENT (OUTPATIENT)
Age: 61
End: 2021-10-05

## 2021-10-05 ENCOUNTER — APPOINTMENT (OUTPATIENT)
Dept: FAMILY MEDICINE | Facility: CLINIC | Age: 61
End: 2021-10-05
Payer: COMMERCIAL

## 2021-10-05 VITALS
DIASTOLIC BLOOD PRESSURE: 90 MMHG | SYSTOLIC BLOOD PRESSURE: 136 MMHG | TEMPERATURE: 97.2 F | HEIGHT: 66 IN | BODY MASS INDEX: 36.48 KG/M2 | RESPIRATION RATE: 14 BRPM | WEIGHT: 227 LBS | HEART RATE: 97 BPM | OXYGEN SATURATION: 99 %

## 2021-10-05 VITALS — SYSTOLIC BLOOD PRESSURE: 130 MMHG | DIASTOLIC BLOOD PRESSURE: 80 MMHG

## 2021-10-05 DIAGNOSIS — Z12.39 ENCOUNTER FOR OTHER SCREENING FOR MALIGNANT NEOPLASM OF BREAST: ICD-10-CM

## 2021-10-05 DIAGNOSIS — M17.12 UNILATERAL PRIMARY OSTEOARTHRITIS, LEFT KNEE: ICD-10-CM

## 2021-10-05 PROCEDURE — 93000 ELECTROCARDIOGRAM COMPLETE: CPT

## 2021-10-05 PROCEDURE — 99396 PREV VISIT EST AGE 40-64: CPT | Mod: 25

## 2021-10-05 RX ORDER — TRIAMCINOLONE ACETONIDE 1 MG/G
0.1 CREAM TOPICAL TWICE DAILY
Qty: 1 | Refills: 2 | Status: DISCONTINUED | COMMUNITY
Start: 2020-01-30 | End: 2021-10-05

## 2021-10-05 NOTE — PLAN
[FreeTextEntry1] : the EKG was reviewed, the lab report from August was reviewed, she will resume the estrogen cream and was given a mammogram rx and will follow up in 3 months or sooner prn

## 2021-10-05 NOTE — REVIEW OF SYSTEMS
[Nocturia] : nocturia [Frequency] : frequency [Joint Pain] : joint pain [Joint Stiffness] : joint stiffness [Negative] : Heme/Lymph

## 2021-10-05 NOTE — HEALTH RISK ASSESSMENT
[Very Good] : ~his/her~ current health as very good [Good] : ~his/her~  mood as  good [Intercurrent ED visits] : went to ED [0-4] : 0-4 [No] : In the past 12 months have you used drugs other than those required for medical reasons? No [One fall no injury in past year] : Patient reported one fall in the past year without injury [HIV test declined] : HIV test declined [Hepatitis C test declined] : Hepatitis C test declined [None] : None [Alone] : lives alone [] :  [Feels Safe at Home] : Feels safe at home [Fully functional (bathing, dressing, toileting, transferring, walking, feeding)] : Fully functional (bathing, dressing, toileting, transferring, walking, feeding) [Fully functional (using the telephone, shopping, preparing meals, housekeeping, doing laundry, using] : Fully functional and needs no help or supervision to perform IADLs (using the telephone, shopping, preparing meals, housekeeping, doing laundry, using transportation, managing medications and managing finances) [Reports normal functional visual acuity (ie: able to read med bottle)] : Reports normal functional visual acuity [Smoke Detector] : smoke detector [Carbon Monoxide Detector] : carbon monoxide detector [Safety elements used in home] : safety elements used in home [Seat Belt] :  uses seat belt [Sunscreen] : uses sunscreen [Patient/Caregiver not ready to engage] : , patient/caregiver not ready to engage [FreeTextEntry1] : see HPI [] : No [de-identified] : left knee pain [de-identified] : Dr Mayer (Ortho), Dr Bolden (Neuro) [de-identified] : stays active [de-identified] : healthy [de-identified] : fell over her dog [Change in mental status noted] : No change in mental status noted [Language] : denies difficulty with language [Behavior] : denies difficulty with behavior [Learning/Retaining New Information] : denies difficulty learning/retaining new information [Handling Complex Tasks] : denies difficulty handling complex tasks [Reasoning] : denies difficulty with reasoning [Spatial Ability and Orientation] : denies difficulty with spatial ability and orientation [Reports changes in hearing] : Reports no changes in hearing [Reports changes in vision] : Reports no changes in vision [Reports changes in dental health] : Reports no changes in dental health [Guns at Home] : no guns at home [Travel to Developing Areas] : does not  travel to developing areas [TB Exposure] : is not being exposed to tuberculosis [Caregiver Concerns] : does not have caregiver concerns [MammogramDate] : 08/20 [PapSmearDate] : 05/21 [ColonoscopyDate] : 08/18 [AdvancecareDate] : 10/21

## 2021-10-05 NOTE — HISTORY OF PRESENT ILLNESS
[FreeTextEntry1] : patient presents for physical exam  [de-identified] : She saw Dr Mayer for the left knee pain and was advised that she has osteoarthritis.  She rested it and iced it and it is feeling better now.  She stopped using the estrogen vaginal cream and has been urinating often again at night.

## 2021-11-08 ENCOUNTER — APPOINTMENT (OUTPATIENT)
Dept: FAMILY MEDICINE | Facility: CLINIC | Age: 61
End: 2021-11-08
Payer: COMMERCIAL

## 2021-11-08 VITALS
RESPIRATION RATE: 14 BRPM | HEIGHT: 66 IN | WEIGHT: 227 LBS | HEART RATE: 65 BPM | BODY MASS INDEX: 36.48 KG/M2 | SYSTOLIC BLOOD PRESSURE: 134 MMHG | DIASTOLIC BLOOD PRESSURE: 80 MMHG | OXYGEN SATURATION: 99 % | TEMPERATURE: 98 F

## 2021-11-08 DIAGNOSIS — M50.20 OTHER CERVICAL DISC DISPLACEMENT, UNSPECIFIED CERVICAL REGION: ICD-10-CM

## 2021-11-08 DIAGNOSIS — H43.392 OTHER VITREOUS OPACITIES, LEFT EYE: ICD-10-CM

## 2021-11-08 DIAGNOSIS — F43.10 POST-TRAUMATIC STRESS DISORDER, UNSPECIFIED: ICD-10-CM

## 2021-11-08 PROCEDURE — 99214 OFFICE O/P EST MOD 30 MIN: CPT

## 2021-11-08 NOTE — PLAN
[FreeTextEntry1] : the neurology consultation, MRI of brain and C spine were reviewed, she was given a PT rx, ophthalmology consultation advised, she was advised to start counseling and was given emotional support throughout the visit, she may take cyclobenzaprine as needed
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
## 2021-11-08 NOTE — REVIEW OF SYSTEMS
[Fatigue] : fatigue [Vision Problems] : vision problems [Joint Pain] : joint pain [Joint Stiffness] : joint stiffness [Muscle Pain] : muscle pain [Insomnia] : no insomnia [Suicidal] : not suicidal [Anxiety] : anxiety [Depression] : no depression [Negative] : Heme/Lymph

## 2021-11-08 NOTE — PHYSICAL EXAM
[No Acute Distress] : no acute distress [Well Nourished] : well nourished [Well Developed] : well developed [Well-Appearing] : well-appearing [Normal Voice/Communication] : normal voice/communication [Normal Sclera/Conjunctiva] : normal sclera/conjunctiva [PERRL] : pupils equal round and reactive to light [EOMI] : extraocular movements intact [de-identified] : very tight muscles in posterior and lateral neck, decreased ROM of C spine [de-identified] : she is very upset and crying

## 2022-01-11 ENCOUNTER — APPOINTMENT (OUTPATIENT)
Dept: FAMILY MEDICINE | Facility: CLINIC | Age: 62
End: 2022-01-11
Payer: COMMERCIAL

## 2022-01-11 VITALS
RESPIRATION RATE: 15 BRPM | TEMPERATURE: 97 F | DIASTOLIC BLOOD PRESSURE: 100 MMHG | SYSTOLIC BLOOD PRESSURE: 138 MMHG | HEIGHT: 66 IN | HEART RATE: 85 BPM | BODY MASS INDEX: 36.48 KG/M2 | OXYGEN SATURATION: 99 % | WEIGHT: 227 LBS

## 2022-01-11 VITALS — SYSTOLIC BLOOD PRESSURE: 128 MMHG | DIASTOLIC BLOOD PRESSURE: 78 MMHG

## 2022-01-11 DIAGNOSIS — Z23 ENCOUNTER FOR IMMUNIZATION: ICD-10-CM

## 2022-01-11 PROCEDURE — 99213 OFFICE O/P EST LOW 20 MIN: CPT

## 2022-01-11 NOTE — HISTORY OF PRESENT ILLNESS
[FreeTextEntry1] : Patient presents for a follow up from having COVID also blood pressure check  [de-identified] : She was diagnosed with Covid on 11/17 and received the monoclonal Ab infusion.  She was out of work for 2 weeks.  She was very fatigued and still feels tired.  She is scheduled for her mammogram in February

## 2022-01-11 NOTE — PLAN
[FreeTextEntry1] : she was advised to start a B complex and to continue other medications without change, to follow a healthy diet and to exercise and to follow up in 3 months or sooner prn

## 2022-03-08 ENCOUNTER — APPOINTMENT (OUTPATIENT)
Dept: FAMILY MEDICINE | Facility: CLINIC | Age: 62
End: 2022-03-08

## 2022-03-21 ENCOUNTER — APPOINTMENT (OUTPATIENT)
Dept: FAMILY MEDICINE | Facility: CLINIC | Age: 62
End: 2022-03-21
Payer: COMMERCIAL

## 2022-03-21 PROCEDURE — 99441: CPT

## 2022-03-21 NOTE — PLAN
[FreeTextEntry1] : she was advised to take medications as prescribed and to seek a Covid swab, she will be provided with a note to be excused from work for yesterday and today and will follow up if symptoms persist or worsen\par

## 2022-03-21 NOTE — HISTORY OF PRESENT ILLNESS
[Home] : at home, [unfilled] , at the time of the visit. [Medical Office: ([facility])___] : at the medical office located in  [Verbal consent obtained from patient] : the patient, [unfilled] [FreeTextEntry8] : The patient telephoned and requested a call back to discuss their health.  The visit was performed over the telephone as the patient was unable to be seen in the office due to the COVID 19 pandemic.  For the past few 5 days she has had a sore throat and nasal congestion and pain in her left ear which is improving.  She has a dry cough and no fever.  She hasn't had any pressure in her face.  She has been feeling tired.  \par

## 2022-03-22 ENCOUNTER — NON-APPOINTMENT (OUTPATIENT)
Age: 62
End: 2022-03-22

## 2022-04-19 ENCOUNTER — APPOINTMENT (OUTPATIENT)
Dept: FAMILY MEDICINE | Facility: CLINIC | Age: 62
End: 2022-04-19
Payer: COMMERCIAL

## 2022-04-19 VITALS
RESPIRATION RATE: 12 BRPM | SYSTOLIC BLOOD PRESSURE: 128 MMHG | BODY MASS INDEX: 36.64 KG/M2 | WEIGHT: 228 LBS | HEART RATE: 60 BPM | HEIGHT: 66 IN | DIASTOLIC BLOOD PRESSURE: 80 MMHG | OXYGEN SATURATION: 97 %

## 2022-04-19 VITALS — TEMPERATURE: 97.6 F

## 2022-04-19 DIAGNOSIS — R09.81 NASAL CONGESTION: ICD-10-CM

## 2022-04-19 DIAGNOSIS — J06.9 ACUTE UPPER RESPIRATORY INFECTION, UNSPECIFIED: ICD-10-CM

## 2022-04-19 PROCEDURE — 99213 OFFICE O/P EST LOW 20 MIN: CPT

## 2022-04-19 RX ORDER — BENZONATATE 100 MG/1
100 CAPSULE ORAL
Qty: 30 | Refills: 1 | Status: DISCONTINUED | COMMUNITY
Start: 2022-03-21 | End: 2022-04-19

## 2022-04-19 NOTE — HISTORY OF PRESENT ILLNESS
[FreeTextEntry1] : pt presents for blood pressure check \par pt presents for cough follow up \par pt c/o eyes +itchy x 3 days  [de-identified] : Her eyes have been red and itchy.  She still has the dry cough from her most recent illness.  She has postnasal drip and some pressure in her ears.  She is working a lot of hours but enjoys spending time with her grandchildren

## 2022-04-19 NOTE — PHYSICAL EXAM
[No Acute Distress] : no acute distress [Well Nourished] : well nourished [Well Developed] : well developed [Well-Appearing] : well-appearing [Normal Voice/Communication] : normal voice/communication [Normal Oropharynx] : the oropharynx was normal [No Lymphadenopathy] : no lymphadenopathy [Supple] : supple [No Respiratory Distress] : no respiratory distress  [No Accessory Muscle Use] : no accessory muscle use [Clear to Auscultation] : lungs were clear to auscultation bilaterally [Normal Rate] : normal rate  [Regular Rhythm] : with a regular rhythm [Normal S1, S2] : normal S1 and S2 [Coordination Grossly Intact] : coordination grossly intact [Normal Mood] : the mood was normal [de-identified] : mild conjunctival injection, mild erythema and crusting of right lower medial eyelid [de-identified] : TM's dull bilaterally

## 2022-04-19 NOTE — REVIEW OF SYSTEMS
[Discharge] : discharge [Redness] : redness [Itching] : itching [Postnasal Drip] : postnasal drip [Shortness Of Breath] : no shortness of breath [Wheezing] : no wheezing [Cough] : cough [Negative] : Heme/Lymph [FreeTextEntry4] : ear pressure

## 2022-04-19 NOTE — PLAN
[FreeTextEntry1] : her hypertension is stable and no medication changes are needed, she was advised to use the ointment and nasal spray as directed and to wash her eyes with water and baby shampoo and to follow up in 3 months or sooner prn

## 2022-07-23 ENCOUNTER — APPOINTMENT (OUTPATIENT)
Dept: FAMILY MEDICINE | Facility: CLINIC | Age: 62
End: 2022-07-23

## 2022-07-23 ENCOUNTER — TRANSCRIPTION ENCOUNTER (OUTPATIENT)
Age: 62
End: 2022-07-23

## 2022-07-30 ENCOUNTER — APPOINTMENT (OUTPATIENT)
Dept: FAMILY MEDICINE | Facility: CLINIC | Age: 62
End: 2022-07-30

## 2022-07-30 VITALS
TEMPERATURE: 99.1 F | HEART RATE: 55 BPM | HEIGHT: 66 IN | RESPIRATION RATE: 14 BRPM | BODY MASS INDEX: 36.16 KG/M2 | WEIGHT: 225 LBS | OXYGEN SATURATION: 98 % | SYSTOLIC BLOOD PRESSURE: 150 MMHG | DIASTOLIC BLOOD PRESSURE: 84 MMHG

## 2022-07-30 VITALS — SYSTOLIC BLOOD PRESSURE: 124 MMHG | DIASTOLIC BLOOD PRESSURE: 70 MMHG

## 2022-07-30 DIAGNOSIS — Z13.0 ENCOUNTER FOR SCREENING FOR DISEASES OF THE BLOOD AND BLOOD-FORMING ORGANS AND CERTAIN DISORDERS INVOLVING THE IMMUNE MECHANISM: ICD-10-CM

## 2022-07-30 DIAGNOSIS — Z13.220 ENCOUNTER FOR SCREENING FOR LIPOID DISORDERS: ICD-10-CM

## 2022-07-30 DIAGNOSIS — Z13.29 ENCOUNTER FOR SCREENING FOR OTHER SUSPECTED ENDOCRINE DISORDER: ICD-10-CM

## 2022-07-30 DIAGNOSIS — R63.8 OTHER SYMPTOMS AND SIGNS CONCERNING FOOD AND FLUID INTAKE: ICD-10-CM

## 2022-07-30 DIAGNOSIS — H10.89 OTHER CONJUNCTIVITIS: ICD-10-CM

## 2022-07-30 DIAGNOSIS — Z13.1 ENCOUNTER FOR SCREENING FOR DIABETES MELLITUS: ICD-10-CM

## 2022-07-30 PROCEDURE — 99214 OFFICE O/P EST MOD 30 MIN: CPT

## 2022-07-30 RX ORDER — CYCLOBENZAPRINE HYDROCHLORIDE 5 MG/1
5 TABLET, FILM COATED ORAL
Qty: 15 | Refills: 1 | Status: DISCONTINUED | COMMUNITY
Start: 2021-11-08 | End: 2022-07-30

## 2022-07-30 RX ORDER — NEOMYCIN AND POLYMYXIN B SULFATES AND DEXAMETHASONE 3.5; 10000; 1 MG/G; [IU]/G; MG/G
3.5-10000-0.1 OINTMENT OPHTHALMIC
Qty: 1 | Refills: 3 | Status: DISCONTINUED | COMMUNITY
Start: 2022-04-19 | End: 2022-07-30

## 2022-07-30 NOTE — HISTORY OF PRESENT ILLNESS
[FreeTextEntry1] : Patient presents for a blood pressure check [de-identified] : She has been working a lot of hours.  She just had a follow up visit with Dr Bolden and she was given a rx for a new muscle relaxer for her neck.  She is having trouble controlling her appetite and would like a medication to help

## 2022-07-30 NOTE — PLAN
[FreeTextEntry1] : she will start naltrexone, potential side effects were reviewed, she will continue healthy diet and regular physical activity, no other medication changes were made and she will follow up for a well visit, labs were ordered to further assess her health, see orders for details

## 2022-08-09 ENCOUNTER — NON-APPOINTMENT (OUTPATIENT)
Age: 62
End: 2022-08-09

## 2022-10-22 ENCOUNTER — APPOINTMENT (OUTPATIENT)
Dept: FAMILY MEDICINE | Facility: CLINIC | Age: 62
End: 2022-10-22

## 2022-10-22 VITALS
OXYGEN SATURATION: 98 % | SYSTOLIC BLOOD PRESSURE: 80 MMHG | RESPIRATION RATE: 14 BRPM | HEART RATE: 56 BPM | BODY MASS INDEX: 36.16 KG/M2 | DIASTOLIC BLOOD PRESSURE: 60 MMHG | WEIGHT: 225 LBS | HEIGHT: 66 IN

## 2022-10-22 VITALS — TEMPERATURE: 97.3 F

## 2022-10-22 VITALS — SYSTOLIC BLOOD PRESSURE: 116 MMHG | DIASTOLIC BLOOD PRESSURE: 70 MMHG

## 2022-10-22 PROCEDURE — 99396 PREV VISIT EST AGE 40-64: CPT

## 2022-10-22 NOTE — HEALTH RISK ASSESSMENT
[Good] : ~his/her~  mood as  good [Intercurrent Urgi Care visits] : went to urgent care [Never] : Never [No] : In the past 12 months have you used drugs other than those required for medical reasons? No [No falls in past year] : Patient reported no falls in the past year [HIV test declined] : HIV test declined [Hepatitis C test declined] : Hepatitis C test declined [None] : None [Alone] : lives alone [Employed] : employed [] :  [Feels Safe at Home] : Feels safe at home [Fully functional (bathing, dressing, toileting, transferring, walking, feeding)] : Fully functional (bathing, dressing, toileting, transferring, walking, feeding) [Fully functional (using the telephone, shopping, preparing meals, housekeeping, doing laundry, using] : Fully functional and needs no help or supervision to perform IADLs (using the telephone, shopping, preparing meals, housekeeping, doing laundry, using transportation, managing medications and managing finances) [Reports normal functional visual acuity (ie: able to read med bottle)] : Reports normal functional visual acuity [Smoke Detector] : smoke detector [Carbon Monoxide Detector] : carbon monoxide detector [Safety elements used in home] : safety elements used in home [Seat Belt] :  uses seat belt [Sunscreen] : uses sunscreen [Patient/Caregiver not ready to engage] : , patient/caregiver not ready to engage [FreeTextEntry1] : feels tired [de-identified] : for URI [de-identified] : Dr Black (GI), Dr Bolden (Neuro) [de-identified] : stays very active [de-identified] : regular [Change in mental status noted] : No change in mental status noted [Language] : denies difficulty with language [Behavior] : denies difficulty with behavior [Learning/Retaining New Information] : denies difficulty learning/retaining new information [Handling Complex Tasks] : denies difficulty handling complex tasks [Reasoning] : denies difficulty with reasoning [Spatial Ability and Orientation] : denies difficulty with spatial ability and orientation [Reports changes in hearing] : Reports no changes in hearing [Reports changes in vision] : Reports no changes in vision [Reports changes in dental health] : Reports no changes in dental health [Guns at Home] : no guns at home [Travel to Developing Areas] : does not  travel to developing areas [TB Exposure] : is not being exposed to tuberculosis [Caregiver Concerns] : does not have caregiver concerns [MammogramDate] : 02/22 [PapSmearDate] : 05/21 [ColonoscopyDate] : 08/18 [AdvancecareDate] : 10/22

## 2022-10-22 NOTE — HISTORY OF PRESENT ILLNESS
[de-identified] : She never started the naltrexone but plans to do so [FreeTextEntry1] : patient presents for physical\par

## 2023-01-24 ENCOUNTER — APPOINTMENT (OUTPATIENT)
Dept: FAMILY MEDICINE | Facility: CLINIC | Age: 63
End: 2023-01-24

## 2023-03-21 ENCOUNTER — RESULT REVIEW (OUTPATIENT)
Age: 63
End: 2023-03-21

## 2023-03-28 ENCOUNTER — APPOINTMENT (OUTPATIENT)
Dept: FAMILY MEDICINE | Facility: CLINIC | Age: 63
End: 2023-03-28
Payer: COMMERCIAL

## 2023-03-28 VITALS
HEIGHT: 66 IN | BODY MASS INDEX: 37.28 KG/M2 | WEIGHT: 232 LBS | HEART RATE: 69 BPM | DIASTOLIC BLOOD PRESSURE: 70 MMHG | OXYGEN SATURATION: 98 % | SYSTOLIC BLOOD PRESSURE: 100 MMHG | RESPIRATION RATE: 14 BRPM

## 2023-03-28 VITALS — SYSTOLIC BLOOD PRESSURE: 110 MMHG | DIASTOLIC BLOOD PRESSURE: 70 MMHG

## 2023-03-28 VITALS — TEMPERATURE: 97.3 F

## 2023-03-28 DIAGNOSIS — E66.9 OBESITY, UNSPECIFIED: ICD-10-CM

## 2023-03-28 PROCEDURE — 99214 OFFICE O/P EST MOD 30 MIN: CPT

## 2023-03-28 NOTE — PLAN
[FreeTextEntry1] : The hypertension is stable and she will continue taking amlodipine 2.5 mg twice daily, bisoprolol 10 mg once daily and losartan/HCTZ 100/25 once daily.  She will start naltrexone 25 mg once daily for 1 week then increase to 50 mg once daily to help curb her appetite.  The mammogram report was reviewed with her.  She was advised to follow-up in 3 months for a Pap and at that point I will order lab testing.

## 2023-03-28 NOTE — HISTORY OF PRESENT ILLNESS
[FreeTextEntry1] : patient presents for blood pressure check  [de-identified] : She never started the naltrexone.  She is feeling well and dealing with stress better.

## 2023-06-20 ENCOUNTER — APPOINTMENT (OUTPATIENT)
Dept: FAMILY MEDICINE | Facility: CLINIC | Age: 63
End: 2023-06-20

## 2023-07-01 ENCOUNTER — APPOINTMENT (OUTPATIENT)
Dept: NEUROLOGY | Facility: HOSPITAL | Age: 63
End: 2023-07-01

## 2023-07-01 ENCOUNTER — INPATIENT (INPATIENT)
Facility: HOSPITAL | Age: 63
LOS: 10 days | Discharge: HOME | DRG: 23 | End: 2023-07-12
Attending: INTERNAL MEDICINE | Admitting: PSYCHIATRY & NEUROLOGY
Payer: COMMERCIAL

## 2023-07-01 ENCOUNTER — TRANSCRIPTION ENCOUNTER (OUTPATIENT)
Age: 63
End: 2023-07-01

## 2023-07-01 VITALS
HEART RATE: 100 BPM | RESPIRATION RATE: 21 BRPM | SYSTOLIC BLOOD PRESSURE: 127 MMHG | HEIGHT: 68 IN | WEIGHT: 222.23 LBS | TEMPERATURE: 98 F | DIASTOLIC BLOOD PRESSURE: 83 MMHG | OXYGEN SATURATION: 98 %

## 2023-07-01 DIAGNOSIS — I48.91 UNSPECIFIED ATRIAL FIBRILLATION: ICD-10-CM

## 2023-07-01 DIAGNOSIS — I63.9 CEREBRAL INFARCTION, UNSPECIFIED: ICD-10-CM

## 2023-07-01 DIAGNOSIS — I72.9 ANEURYSM OF UNSPECIFIED SITE: ICD-10-CM

## 2023-07-01 DIAGNOSIS — Z98.890 OTHER SPECIFIED POSTPROCEDURAL STATES: Chronic | ICD-10-CM

## 2023-07-01 LAB
ANION GAP SERPL CALC-SCNC: 14 MMOL/L — SIGNIFICANT CHANGE UP (ref 5–17)
BUN SERPL-MCNC: 15.8 MG/DL — SIGNIFICANT CHANGE UP (ref 8–20)
CALCIUM SERPL-MCNC: 9 MG/DL — SIGNIFICANT CHANGE UP (ref 8.4–10.5)
CHLORIDE SERPL-SCNC: 100 MMOL/L — SIGNIFICANT CHANGE UP (ref 96–108)
CO2 SERPL-SCNC: 21 MMOL/L — LOW (ref 22–29)
CREAT SERPL-MCNC: 0.71 MG/DL — SIGNIFICANT CHANGE UP (ref 0.5–1.3)
EGFR: 95 ML/MIN/1.73M2 — SIGNIFICANT CHANGE UP
GLUCOSE BLDC GLUCOMTR-MCNC: 160 MG/DL — HIGH (ref 70–99)
GLUCOSE SERPL-MCNC: 164 MG/DL — HIGH (ref 70–99)
HCT VFR BLD CALC: 42.7 % — SIGNIFICANT CHANGE UP (ref 34.5–45)
HGB BLD-MCNC: 14.5 G/DL — SIGNIFICANT CHANGE UP (ref 11.5–15.5)
MCHC RBC-ENTMCNC: 29.1 PG — SIGNIFICANT CHANGE UP (ref 27–34)
MCHC RBC-ENTMCNC: 34 GM/DL — SIGNIFICANT CHANGE UP (ref 32–36)
MCV RBC AUTO: 85.6 FL — SIGNIFICANT CHANGE UP (ref 80–100)
PLATELET # BLD AUTO: 325 K/UL — SIGNIFICANT CHANGE UP (ref 150–400)
POTASSIUM SERPL-MCNC: 4.4 MMOL/L — SIGNIFICANT CHANGE UP (ref 3.5–5.3)
POTASSIUM SERPL-SCNC: 4.4 MMOL/L — SIGNIFICANT CHANGE UP (ref 3.5–5.3)
RBC # BLD: 4.99 M/UL — SIGNIFICANT CHANGE UP (ref 3.8–5.2)
RBC # FLD: 13.2 % — SIGNIFICANT CHANGE UP (ref 10.3–14.5)
SODIUM SERPL-SCNC: 135 MMOL/L — SIGNIFICANT CHANGE UP (ref 135–145)
TROPONIN T SERPL-MCNC: <0.01 NG/ML — SIGNIFICANT CHANGE UP (ref 0–0.06)
WBC # BLD: 17.84 K/UL — HIGH (ref 3.8–10.5)
WBC # FLD AUTO: 17.84 K/UL — HIGH (ref 3.8–10.5)

## 2023-07-01 PROCEDURE — 99222 1ST HOSP IP/OBS MODERATE 55: CPT

## 2023-07-01 PROCEDURE — 36224 PLACE CATH CAROTD ART: CPT | Mod: 59,RT

## 2023-07-01 PROCEDURE — 70450 CT HEAD/BRAIN W/O DYE: CPT | Mod: 26

## 2023-07-01 PROCEDURE — 61645 PERQ ART M-THROMBECT &/NFS: CPT | Mod: LT

## 2023-07-01 PROCEDURE — 76377 3D RENDER W/INTRP POSTPROCES: CPT | Mod: 26

## 2023-07-01 PROCEDURE — 93010 ELECTROCARDIOGRAM REPORT: CPT

## 2023-07-01 PROCEDURE — G0426: CPT | Mod: 95

## 2023-07-01 RX ORDER — SODIUM CHLORIDE 9 MG/ML
1000 INJECTION INTRAMUSCULAR; INTRAVENOUS; SUBCUTANEOUS
Refills: 0 | Status: DISCONTINUED | OUTPATIENT
Start: 2023-07-01 | End: 2023-07-02

## 2023-07-01 RX ORDER — SODIUM CHLORIDE 9 MG/ML
500 INJECTION INTRAMUSCULAR; INTRAVENOUS; SUBCUTANEOUS ONCE
Refills: 0 | Status: COMPLETED | OUTPATIENT
Start: 2023-07-01 | End: 2023-07-01

## 2023-07-01 RX ORDER — SODIUM CHLORIDE 9 MG/ML
10 INJECTION INTRAMUSCULAR; INTRAVENOUS; SUBCUTANEOUS ONCE
Refills: 0 | Status: COMPLETED | OUTPATIENT
Start: 2023-07-01 | End: 2023-07-01

## 2023-07-01 RX ORDER — HYDRALAZINE HCL 50 MG
10 TABLET ORAL
Refills: 0 | Status: DISCONTINUED | OUTPATIENT
Start: 2023-07-01 | End: 2023-07-03

## 2023-07-01 RX ORDER — METOPROLOL TARTRATE 50 MG
25 TABLET ORAL
Refills: 0 | Status: DISCONTINUED | OUTPATIENT
Start: 2023-07-01 | End: 2023-07-02

## 2023-07-01 RX ORDER — CHLORHEXIDINE GLUCONATE 213 G/1000ML
1 SOLUTION TOPICAL DAILY
Refills: 0 | Status: DISCONTINUED | OUTPATIENT
Start: 2023-07-01 | End: 2023-07-12

## 2023-07-01 RX ORDER — SODIUM CHLORIDE 9 MG/ML
1000 INJECTION INTRAMUSCULAR; INTRAVENOUS; SUBCUTANEOUS
Refills: 0 | Status: DISCONTINUED | OUTPATIENT
Start: 2023-07-01 | End: 2023-07-01

## 2023-07-01 RX ORDER — METOPROLOL TARTRATE 50 MG
5 TABLET ORAL EVERY 6 HOURS
Refills: 0 | Status: DISCONTINUED | OUTPATIENT
Start: 2023-07-01 | End: 2023-07-01

## 2023-07-01 RX ORDER — ONDANSETRON 8 MG/1
4 TABLET, FILM COATED ORAL EVERY 6 HOURS
Refills: 0 | Status: DISCONTINUED | OUTPATIENT
Start: 2023-07-01 | End: 2023-07-12

## 2023-07-01 RX ORDER — METOPROLOL TARTRATE 50 MG
5 TABLET ORAL EVERY 6 HOURS
Refills: 0 | Status: DISCONTINUED | OUTPATIENT
Start: 2023-07-01 | End: 2023-07-02

## 2023-07-01 RX ADMIN — Medication 5 MILLIGRAM(S): at 18:58

## 2023-07-01 RX ADMIN — SODIUM CHLORIDE 50 MILLILITER(S): 9 INJECTION INTRAMUSCULAR; INTRAVENOUS; SUBCUTANEOUS at 18:25

## 2023-07-01 RX ADMIN — SODIUM CHLORIDE 50 MILLILITER(S): 9 INJECTION INTRAMUSCULAR; INTRAVENOUS; SUBCUTANEOUS at 23:27

## 2023-07-01 RX ADMIN — SODIUM CHLORIDE 10 MILLILITER(S): 9 INJECTION INTRAMUSCULAR; INTRAVENOUS; SUBCUTANEOUS at 18:35

## 2023-07-01 RX ADMIN — Medication 25 MILLIGRAM(S): at 22:53

## 2023-07-01 RX ADMIN — ONDANSETRON 4 MILLIGRAM(S): 8 TABLET, FILM COATED ORAL at 18:25

## 2023-07-01 RX ADMIN — SODIUM CHLORIDE 500 MILLILITER(S): 9 INJECTION INTRAMUSCULAR; INTRAVENOUS; SUBCUTANEOUS at 23:28

## 2023-07-01 NOTE — H&P ADULT - NSICDXFAMILYHX_GEN_ALL_CORE_FT
FAMILY HISTORY:  Mother  Still living? No  Family history of stroke or transient ischemic attack in mother, Age at diagnosis: Age Unknown  FH: diabetes mellitus, Age at diagnosis: Age Unknown    Sibling  Still living? Unknown  Family history of stroke or transient ischemic attack in mother, Age at diagnosis: Age Unknown

## 2023-07-01 NOTE — DISCHARGE NOTE NURSING/CASE MANAGEMENT/SOCIAL WORK - NSDCPEFALRISK_GEN_ALL_CORE
For information on Fall & Injury Prevention, visit: [email or web address] OR  [email or web address] OR  https://www.cdc.gov/steadi/patient.html

## 2023-07-01 NOTE — CONSULT NOTE ADULT - SUBJECTIVE AND OBJECTIVE BOX
[facility]                                              CARDIOLOGY AT [place] [address]                                             Telephone: [phone]. Fax:[phone]                                                       CARDIOLOGY CONSULTATION NOTE                                                                                             History obtained by: Patient and medical record  Community Cardiologist: n/a   obtained: Yes [  ] No [ x ]  Reason for Consultation: Afib, CVA  Available out pt records reviewed: Yes [ x ] No [  ]    Chief complaint:    Patient is a 62y old  Female who presents with a chief complaint of stroke with thrombus (01 Jul 2023 18:05)      HPI:  This is 63 y/o female with hx of HTN, remote hyperthyroid, family hx of CVA (mother, brother) who presented to [facility] with left-sided numbness, weakness and slurred speech. Head CTA revealed right PCA near-complete occlusion at P1/P2 junction. Pt was given tenecteplase and she was then transferred to [facility] for possible intervention. She is now s/p cerebral angiography and suction thrombectomy.   Pt was found to have Afib with RVR on telemetry. She denies any prior hx of Afib but endorses recent feeling of fluttering in her chest. Pt denies chest pain or SOB. EKG at [facility] shows Afib with ST depressions and TWI in lateral leads. Pt does not have a cardiologist.    CARDIAC TESTING   ECHO: n/a    STRESS: n/a    CATH: n/a    ELECTROPHYSIOLOGY: n/a    PAST MEDICAL HISTORY  HTN (hypertension)        PAST SURGICAL HISTORY  H/O carpal tunnel repair        SOCIAL HISTORY: lives alone, works as CNA at [employer]  SMOKING: never smoked  ALCOHOL: denies  DRUGS: denies    FAMILY HISTORY:  Family history of stroke or transient ischemic attack in mother (Mother, Sibling)    FH: diabetes mellitus (Mother)      Family History of Cardiovascular Disease:  Yes [  ] No [  ]  Coronary Artery Disease in first degree relative: Yes [  ] No [  ]  Sudden Cardiac Death in First degree relative: Yes [  ] No [  ]    HOME MEDICATIONS:  bisoprolol 10 mg oral tablet: 1 orally once a day (01 Jul 2023 18:54)  losartan-hydroCHLOROthiazide 100 mg-25 mg oral tablet: 1 orally once a day (01 Jul 2023 18:54)  Norvasc 2.5 mg oral tablet: 1 orally 2 times a day (01 Jul 2023 18:54)      CURRENT CARDIAC MEDICATIONS:  hydrALAZINE Injectable 10 milliGRAM(s) IV Push every 2 hours PRN SBP > 140  metoprolol tartrate Injectable 5 milliGRAM(s) IV Push every 6 hours      CURRENT OTHER MEDICATIONS:  ondansetron Injectable 4 milliGRAM(s) IV Push every 6 hours PRN Nausea and/or Vomiting  chlorhexidine 2% Cloths 1 Application(s) Topical daily  sodium chloride 0.9%. 1000 milliLiter(s) (50 mL/Hr) IV Continuous <Continuous>      ALLERGIES:   azithromycin (Hives; Rash)  penicillin (Hives; Rash)      REVIEW OF SYMPTOMS:   CONSTITUTIONAL: No fever, no chills, no weight loss, no weight gain, no fatigue   ENMT:  No vertigo; No sinus or throat pain  NECK: No pain or stiffness  CARDIOVASCULAR: as per HPIRY: no Shortness of breath, no cough, no wheezing  : No dysuria, no hematuria   GI: No dark color stool, no nausea, no diarrhea, no constipation, no abdominal pain   NEURO: No headache, slurred speech resolved  MUSCULOSKELETAL: No joint pain or swelling; No muscle, back, or extremity pain  PSYCH: No agitation, no anxiety.    ALL OTHER REVIEW OF SYSTEMS ARE NEGATIVE.    VITAL SIGNS:  T(C): 36.7 (07-01-23 @ 17:52), Max: 36.7 (07-01-23 @ 17:52)  T(F): 98 (07-01-23 @ 17:52), Max: 98 (07-01-23 @ 17:52)  HR: 124 (07-01-23 @ 20:52) (100 - 125)  BP: 135/93 (07-01-23 @ 20:52) (110/94 - 135/93)  RR: 20 (07-01-23 @ 20:52) (20 - 26)  SpO2: 97% (07-01-23 @ 20:52) (94% - 99%)    INTAKE AND OUTPUT:     07-01 @ 07:01  -  07-01 @ 21:11  --------------------------------------------------------  IN: 200 mL / OUT: 120 mL / NET: 80 mL        PHYSICAL EXAM:  Constitutional: Comfortable . No acute distress.   HEENT: Atraumatic and normocephalic , neck is supple . no JVD. No carotid bruit.  CNS: A&Ox3. No focal deficits.   Respiratory: CTAB, unlabored   Cardiovascular: tachycardic, irregular, no murmurs  Gastrointestinal: Soft, non-tender. +Bowel sounds.   Extremities: 2+ Peripheral Pulses, No clubbing, cyanosis, or edema  Psychiatric: Calm . no agitation.   Skin: Warm and dry, no ulcers on extremities     LABS:  ( 01 Jul 2023 18:50 )  Troponin T  <0.01,  CPK  X    , CKMB  X    , BNP X                             14.5   17.84 )-----------( 325      ( 01 Jul 2023 18:50 )             42.7     07-01    135  |  100  |  15.8  ----------------------------<  164<H>  4.4   |  21.0<L>  |  0.71    Ca    9.0      01 Jul 2023 18:50        Urinalysis Basic - ( 01 Jul 2023 18:50 )    Color: x / Appearance: x / SG: x / pH: x  Gluc: 164 mg/dL / Ketone: x  / Bili: x / Urobili: x   Blood: x / Protein: x / Nitrite: x   Leuk Esterase: x / RBC: x / WBC x   Sq Epi: x / Non Sq Epi: x / Bacteria: x        INTERPRETATION OF TELEMETRY: AFib with 's-130's    ECG: Afib with  bpm, ST depressions and TWI in lateral leads  Prior ECG: Yes [  ] No [ x ]    RADIOLOGY & ADDITIONAL STUDIES: n/a   X-ray:    CT scan:   MRI:   US:

## 2023-07-01 NOTE — H&P ADULT - NSHPPHYSICALEXAM_GEN_ALL_CORE
T(C): 36.7 (07-01-23 @ 17:52), Max: 36.7 (07-01-23 @ 17:52)  HR: 115 (07-01-23 @ 18:07) (100 - 115)  BP: 110/94 (07-01-23 @ 18:07) (110/94 - 127/83)  RR: 23 (07-01-23 @ 18:07) (21 - 23)  SpO2: 99% (07-01-23 @ 18:07) (98% - 99%)  GCS 15  CONSTITUTIONAL:no apparent distress  EYES: PERRLA and symmetric, EOMI, No conjunctival or scleral injection, non-icteric, visual field intact,   ENMT: Oral mucosa with moist membranes. Normal dentition; no pharyngeal injection or exudates, neg facial             NECK: Supple, symmetric and without tracheal deviation   RESP: No respiratory distress, no use of accessory muscles; CTA b/l, no WRR  CV: reg irreg , +S1S2, no MRG; no JVD;   GI: Soft, NT, ND, no rebound, no guarding; no palpable masses;            Normal ROM without pain, no spinal tenderness, normal muscle strength/tone, right lower extrem maintain straight w/o bending dressing c/d/i neg hematoma. ballon inflated.   NEURO: CN II-XII intact; upper left hand sensory tingling and slight dec sensation on the left face region;  lower extremities intact, motor 5/5 rue/rle; left upper 5/5 lle slight weakness unable to hold elevated full 10 sex drop at 5.   Appropriate insight/judgment; A+O x 3, mood and affect appropriate, recent/remote memory intact

## 2023-07-01 NOTE — CONSULT NOTE ADULT - PROBLEM SELECTOR RECOMMENDATION 9
-telemetry monitoring  -continue Lopressor 5 mg IV Q6, may convert to Metoprolol 50 mg bid when taking PO  -TTE  -appears new onset, will likely need MIRANDA/DCCV  -outpatient ischemic evaluation  -CHADSVASC 4, start DOAC when cleared by neurology  -TSH 1.63 at [place], check lipid panel and Hgb A1C -telemetry monitoring  -continue Lopressor 5 mg IV Q6, may convert to Metoprolol 50 mg bid when taking PO  -TTE  -appears new onset,   -outpatient ischemic evaluation  -CHADSVASC 4, start DOAC when cleared by neurology  -TSH 1.63 at [place], check lipid panel and Hgb A1C

## 2023-07-01 NOTE — CHART NOTE - NSCHARTNOTEFT_GEN_A_CORE
Neurointerventional Surgery Pre-Procedure Note     This is a 62y Female    HPI:  This is a 62y Female  last seen well at 1200 noon. Developed Left sided numbness weaknee. CT CTA CTP revealed L P!- P2 occlusion with no core and 40 ml penumbra.  Patient was given tenecteplase at [facility] and is being transferred to [facility] for possible thrombectomy.    Allergies:     PMH/PSH:  PAST MEDICAL & SURGICAL HISTORY:      Social History:   Social History:    FAMILY HISTORY:      Current Medications:       Physical Exam:  Constitutional: NAD    Neuro  * Mental Status:  GCS 15:  E(4), V(5), M(6).  Awake, alert, oriented to conversation.  * Cranial Nerves: Cnii-Cnxii grossly intact. except for L superior homonymous quadrantanopia. PERRL, EOMI, tongue midline, no gaze deviation.  * Motor: RUE 5/5, LUE 5/5, RLE 5/5, LLE 5/5  * Sensory: Sensation intact to light touch.  * Reflexes: Not assessed  * Gait: Not assessed    Cardiovascular:  S1, S2 no murmurs appreciated.  Regular rate and rhythm.  Eyes: See neurologic examination with detailed examination of eyes.  ENT: No JVD, Trachea Midline.  Respiratory: Clear to auscultation.  Gastrointestinal: Soft, nontender, nondistended.  Genitourinary: [ ] Emery, [ x ] No Emery.   Musculoskeletal: No muscle wasting noted, (See neurologic assessment for full muscle strength assessment) No pretibial edema appreciated, no appreciable calf tenderness.  Skin:  Wound inspected, no redness, bleeding or drainage noted.    Hematologic / Lymph / Immunologic: No bleeding from IV sites or wounds, No lymphadenopathy, No Hives or allergic type skin lesions      [facility] SS:  DATE: 07-  TIME:   1A: Level of consciousness (0-3): 0  1B: Questions (0-2): 0    1C: Commands (0-2): 0  2: Gaze (0-2): 0  3: Visual fields (0-3): 0  4: Facial palsy (0-3): 0  MOTOR:  5A: Left arm motor drift (0-4): 0  5B: Right arm motor drift (0-4): 0  6A: Left leg motor drift (0-4): 0  6B: Right leg motor drift (0-4): 0  7: Limb ataxia (0-2): 0  SENSORY:  8: Sensation (0-2): 0  SPEECH:  9: Language (0-3): 0  10: Dysarthria (0-2): 0  EXTINCTION:  11: Extinction/inattention (0-2): 0    TOTAL SCORE:     Labs:         Blood Bank:         Assessment/Plan:   This is a 62y  year old right / left hand dominant Female  presents with left sided numbness and visual difficulty. CTA CTP revealed R P1-P2 occlusion.  Patient presents to neuro-IR for selective cerebral angiography and possible thrombectomy.    Procedure, goals, risks, benefits and alternatives  were discussed with patient and (patient's family).  All questions were answered.  Risks include but are not limited to stroke, vessel injury, hemorrhage, and or groin hematoma.  Patient demonstrates understanding  of all risks involved with this procedure and wishes to continue.   Appropriate  consent was obtained from patient and consent is in the patient's chart. Neurointerventional Surgery Pre-Procedure Note     This is a 62y Female    HPI:  This is a 62y Female  last seen well at 1200 noon. Developed Left sided numbness weaknee. CT CTA CTP revealed L P!- P2 occlusion with no core and 40 ml penumbra.  Patient was given tenecteplase at [facility] and is being transferred to [facility] for possible thrombectomy.    Allergies:     PMH/PSH:  PAST MEDICAL & SURGICAL HISTORY:      Social History:   Social History:    FAMILY HISTORY:      Current Medications:       Physical Exam:  Constitutional: NAD    Neuro  * Mental Status:  GCS 15:  E(4), V(5), M(6).  Awake, alert, oriented to conversation.  * Cranial Nerves: Cnii-Cnxii grossly intact. except for L superior homonymous quadrantanopia. PERRL, EOMI, tongue midline, no gaze deviation.  * Motor: RUE 5/5, LUE 5/5, RLE 5/5, LLE 5/5  * Sensory: Sensation intact to light touch.  * Reflexes: Not assessed  * Gait: Not assessed    Cardiovascular:  S1, S2 no murmurs appreciated.  Regular rate and rhythm.  Eyes: See neurologic examination with detailed examination of eyes.  ENT: No JVD, Trachea Midline.  Respiratory: Clear to auscultation.  Gastrointestinal: Soft, nontender, nondistended.  Genitourinary: [ ] Emery, [ x ] No Emery.   Musculoskeletal: No muscle wasting noted, (See neurologic assessment for full muscle strength assessment) No pretibial edema appreciated, no appreciable calf tenderness.  Skin:  Wound inspected, no redness, bleeding or drainage noted.    Hematologic / Lymph / Immunologic: No bleeding from IV sites or wounds, No lymphadenopathy, No Hives or allergic type skin lesions      [facility] SS:  DATE: 07-  TIME:   1A: Level of consciousness (0-3): 0  1B: Questions (0-2): 0    1C: Commands (0-2): 0  2: Gaze (0-2): 0  3: Visual fields (0-3): 0  4: Facial palsy (0-3): 0  MOTOR:  5A: Left arm motor drift (0-4): 0  5B: Right arm motor drift (0-4): 0  6A: Left leg motor drift (0-4):-------1  6B: Right leg motor drift (0-4): 0  7: Limb ataxia (0-2): 0  SENSORY:  8: Sensation (0-2): --------1  SPEECH:  9: Language (0-3): 0  10: Dysarthria (0-2):   EXTINCTION:  11: Extinction/inattention (0-2): 0    TOTAL SCORE:  2    Labs:         Blood Bank:         Assessment/Plan:   This is a 62y  year old right / dominant Female  presents with left sided numbness and visual difficulty. CTA CTP revealed R P1-P2 occlusion.  Patient presents to neuro-IR for selective cerebral angiography and possible thrombectomy.    Procedure, goals, risks, benefits and alternatives  were discussed with patient and (patient's family).  All questions were answered.  Risks include but are not limited to stroke, vessel injury, hemorrhage, and or groin hematoma.  Patient demonstrates understanding  of all risks involved with this procedure and wishes to continue.   Appropriate  consent was obtained from patient and consent is in the patient's chart. Neurointerventional Surgery Pre-Procedure Note     This is a 62y Female    HPI:  This is a 62y Female  last seen well at 1200 noon. Developed Left sided numbness weaknee. CT CTA CTP revealed R P!- P2 occlusion with no core and 40 ml penumbra.  Patient was given tenecteplase at [facility] and is being transferred to [facility] for possible thrombectomy.    Allergies:     PMH/PSH:  PAST MEDICAL & SURGICAL HISTORY:      Social History:   Social History:    FAMILY HISTORY:      Current Medications:       Physical Exam:  Constitutional: NAD    Neuro  * Mental Status:  GCS 15:  E(4), V(5), M(6).  Awake, alert, oriented to conversation.  * Cranial Nerves: Cnii-Cnxii grossly intact. except for L superior homonymous quadrantanopia. PERRL, EOMI, tongue midline, no gaze deviation.  * Motor: RUE 5/5, LUE 5/5, RLE 5/5, LLE 5/5  * Sensory: Sensation intact to light touch.  * Reflexes: Not assessed  * Gait: Not assessed    Cardiovascular:  S1, S2 no murmurs appreciated.  Regular rate and rhythm.  Eyes: See neurologic examination with detailed examination of eyes.  ENT: No JVD, Trachea Midline.  Respiratory: Clear to auscultation.  Gastrointestinal: Soft, nontender, nondistended.  Genitourinary: [ ] Emery, [ x ] No Emery.   Musculoskeletal: No muscle wasting noted, (See neurologic assessment for full muscle strength assessment) No pretibial edema appreciated, no appreciable calf tenderness.  Skin:  Wound inspected, no redness, bleeding or drainage noted.    Hematologic / Lymph / Immunologic: No bleeding from IV sites or wounds, No lymphadenopathy, No Hives or allergic type skin lesions      [facility] SS:  DATE: 07-  TIME:   1A: Level of consciousness (0-3): 0  1B: Questions (0-2): 0    1C: Commands (0-2): 0  2: Gaze (0-2): 0  3: Visual fields (0-3): 0  4: Facial palsy (0-3): 0  MOTOR:  5A: Left arm motor drift (0-4): 0  5B: Right arm motor drift (0-4): 0  6A: Left leg motor drift (0-4):-------1  6B: Right leg motor drift (0-4): 0  7: Limb ataxia (0-2): 0  SENSORY:  8: Sensation (0-2): --------1  SPEECH:  9: Language (0-3): 0  10: Dysarthria (0-2):   EXTINCTION:  11: Extinction/inattention (0-2): 0    TOTAL SCORE:  2    Labs:         Blood Bank:         Assessment/Plan:   This is a 62y  year old right / dominant Female  presents with left sided numbness and visual difficulty. CTA CTP revealed R P1-P2 occlusion.  Patient presents to neuro-IR for selective cerebral angiography and possible thrombectomy.    Procedure, goals, risks, benefits and alternatives  were discussed with patient and (patient's family).  All questions were answered.  Risks include but are not limited to stroke, vessel injury, hemorrhage, and or groin hematoma.  Patient demonstrates understanding  of all risks involved with this procedure and wishes to continue.   Appropriate  consent was obtained from patient and consent is in the patient's chart.

## 2023-07-01 NOTE — H&P ADULT - HISTORY OF PRESENT ILLNESS
This is a 62y Female  last seen well at 1200 noon. Developed Left sided numbness weaknee. CT CTA CTP revealed L P!- P2 occlusion with no core and 40 ml penumbra.  Patient was given tenecteplase at [facility] and is being transferred to [facility] for possible thrombectomy.  NIH 2 ICH 0 mod ranikin 1

## 2023-07-01 NOTE — H&P ADULT - NSHPREVIEWOFSYSTEMS_GEN_ALL_CORE
Review of System  Cardiac-neg HTN   Pulm-neg  GI -neg  -neg  Endo thyroid over act few years ago. normal most recently.

## 2023-07-01 NOTE — CONSULT NOTE ADULT - NS ATTEND AMEND GEN_ALL_CORE FT
Consulted for AF and CVA   Afib with RVR - on Lopressor 25mg po q 6 hrs and Cardizem 60mg po q 6 hrs;   appears to have received Digoxin 250mcg x1, inorder to be loaded needs to be receive 1 gram load, so would give 250mcg x3 q 6 hrs and then transition to Digoxin 125mcg po q daily  can uptirate Lopressor to 50mgpo q 6 hrs as BP tolerates   Patient had an acute stroke and unable to start AC at this time and due to uncertainty of how long patient has been in AF would not recommend antiarrythmic; and unable to consider MIRANDA/DCCV because post cardioversion patient will need ot be on uninterrupted AC for 4-6 weeks and can only be managed with rate control  Pending TTE to assess LV function and valvulopathy    will follow up tomorrow    Shama Xavier D.O. [facility]  Cardiology/Vascular Cardiology -[facility]   Telephone # [phone]

## 2023-07-01 NOTE — CHART NOTE - NSCHARTNOTEFT_GEN_A_CORE
Neurointerventional Surgery Post Procedure Note    Procedure: Selective Cerebral Angiography     Pre- Procedure Diagnosis: Stroke   Post- Procedure Diagnosis: Stroke    : Dr. Ko Zhou MD  First Assistant: Nadia Hill MD  Nurse: Jayda ALEXIS  Anesthesiologist:                                            Radiology Tech: Sanchez Wilde    Sheath:  - 6 Emirati BMX 80 Sheath    Vitals:  BP   HR   Spo2  as per     Preliminary Report:  Under a 4 Emirati short sheath via the right groin under MAC sedation via left vertebral artery, left internal carotid artery, left external carotid artery, right vertebral artery, right internal carotid artery, right external carotid artery, a selective cerebral angiography  was performed and reveals       . ( Official note to follow).    Patient tolerated procedure well.  Patient remains hemodynamically stable, no change in neurological status compared to baseline.  Results were discussed with patient, patient's family and Neurosurgery.  Right groin sheath  was discontinued. Hemostasis was obtained with approximately 15 minutes of manual compression.     No active bleeding, no hematoma, no ecchymosis.   Quick clot and safeguard balloon dressing applied at   Patient transferred to recovery room in stable condition. Neurointerventional Surgery Post Procedure Note    Procedure: Selective Cerebral Angiography     Pre- Procedure Diagnosis: Stroke   Post- Procedure Diagnosis: Stroke    : Dr. Ko Zhou MD  First Assistant: Nadia Hill MD  Nurse: Jayda ALEXIS  Anesthesiologist: Dr Hooks Tech: Sanchez Wilde    Sheath:  - 6 Chinese BMX 80 Sheath    Vitals:  BP   HR   Spo2  as per     Preliminary Report:  Under a 6 Chinese BMX 80 sheath via the right groin under MAC sedation via left vertebral artery, right internal carotid artery, r, a selective cerebral angiography  was performed and reveals   R PCA P2 occlusion. Suction thrombectomy was performed with TICI 3 reperfusion. ( Official note to follow).    Patient tolerated procedure well.  Patient remains hemodynamically stable, no change in neurological status compared to baseline.  Results were discussed with patient, patient's family and NeuroICU.  Right groin sheath  was discontinued. Hemostasis was obtained with 6 Chinese angioseal approximately 15 minutes of manual compression.     No active bleeding, no hematoma, no ecchymosis.   Quick clot and safeguard balloon dressing applied at   Patient transferred to recovery room in stable condition. Neurointerventional Surgery Post Procedure Note    Procedure: Selective Cerebral Angiography     Pre- Procedure Diagnosis: Stroke   Post- Procedure Diagnosis: Stroke    : Dr. Ko Zhou MD  First Assistant: Nadia Hill MD  Nurse: Jayda ALEXIS  Anesthesiologist: Dr Hooks Tech: Sanchez Wilde    Sheath:  - 6 Ecuadorean BMX 80 Sheath    Vitals:  BP   HR   Spo2  as per     Preliminary Report:  Under a 6 Ecuadorean BMX 80 sheath via the right groin under MAC sedation via left vertebral artery, right internal carotid artery, r, a selective cerebral angiography  was performed and reveals   R PCA P1-P2 occlusion. Suction thrombectomy was performed with TICI 3 reperfusion. ( Official note to follow).    Patient tolerated procedure well.  Patient remains hemodynamically stable, no change in neurological status compared to baseline.  Results were discussed with patient, patient's family and NeuroICU.  Right groin sheath  was discontinued. Hemostasis was obtained with 6 Ecuadorean angioseal approximately 15 minutes of manual compression.     No active bleeding, no hematoma, no ecchymosis.   Quick clot and safeguard balloon dressing applied at   Patient transferred to recovery room in stable condition.

## 2023-07-01 NOTE — DISCHARGE NOTE NURSING/CASE MANAGEMENT/SOCIAL WORK - PATIENT PORTAL LINK FT
You can access the FollowMyHealth Patient Portal offered by [facility] by registering at the following website: [email or web address]. By joining [facility] [employer]’s FollowMyHealth portal, you will also be able to view your health information using other applications (apps) compatible with our system.

## 2023-07-01 NOTE — H&P ADULT - ASSESSMENT
This is a 62yf presented with left benjamin homo with left  sided weakness and sensory change.  Pt noted to have a p1/2 occlusion. Thromb performed with Tici3 pos persist sensory changes on the left facial and hand left. vision grossly intact    Plan  -admit to the neuro icu  -will maintain -150  -pt orders will follow the stroke orders   -cardizem to transition to metoprolol  -pt exam and films review with intensivist and will obtain mri brain/ct brain

## 2023-07-01 NOTE — CONSULT NOTE ADULT - PROBLEM SELECTOR RECOMMENDATION 2
-likely cardioembolic etiology  -s/p TNK and thrombectomy  -ASA/statin when taking PO  -management as per neurology

## 2023-07-02 LAB
A1C WITH ESTIMATED AVERAGE GLUCOSE RESULT: 5 % — SIGNIFICANT CHANGE UP (ref 4–5.6)
ANION GAP SERPL CALC-SCNC: 13 MMOL/L — SIGNIFICANT CHANGE UP (ref 5–17)
BUN SERPL-MCNC: 17.3 MG/DL — SIGNIFICANT CHANGE UP (ref 8–20)
CALCIUM SERPL-MCNC: 8.4 MG/DL — SIGNIFICANT CHANGE UP (ref 8.4–10.5)
CHLORIDE SERPL-SCNC: 103 MMOL/L — SIGNIFICANT CHANGE UP (ref 96–108)
CHOLEST SERPL-MCNC: 146 MG/DL — SIGNIFICANT CHANGE UP
CO2 SERPL-SCNC: 20 MMOL/L — LOW (ref 22–29)
CREAT SERPL-MCNC: 0.67 MG/DL — SIGNIFICANT CHANGE UP (ref 0.5–1.3)
EGFR: 98 ML/MIN/1.73M2 — SIGNIFICANT CHANGE UP
ESTIMATED AVERAGE GLUCOSE: 97 MG/DL — SIGNIFICANT CHANGE UP (ref 68–114)
GLUCOSE SERPL-MCNC: 129 MG/DL — HIGH (ref 70–99)
HCT VFR BLD CALC: 37.9 % — SIGNIFICANT CHANGE UP (ref 34.5–45)
HCV AB S/CO SERPL IA: 0.12 S/CO — SIGNIFICANT CHANGE UP (ref 0–0.99)
HCV AB SERPL-IMP: SIGNIFICANT CHANGE UP
HDLC SERPL-MCNC: 43 MG/DL — LOW
HGB BLD-MCNC: 12.8 G/DL — SIGNIFICANT CHANGE UP (ref 11.5–15.5)
LIPID PNL WITH DIRECT LDL SERPL: 91 MG/DL — SIGNIFICANT CHANGE UP
MAGNESIUM SERPL-MCNC: 2.1 MG/DL — SIGNIFICANT CHANGE UP (ref 1.6–2.6)
MCHC RBC-ENTMCNC: 29 PG — SIGNIFICANT CHANGE UP (ref 27–34)
MCHC RBC-ENTMCNC: 33.8 GM/DL — SIGNIFICANT CHANGE UP (ref 32–36)
MCV RBC AUTO: 85.7 FL — SIGNIFICANT CHANGE UP (ref 80–100)
MRSA PCR RESULT.: SIGNIFICANT CHANGE UP
NON HDL CHOLESTEROL: 103 MG/DL — SIGNIFICANT CHANGE UP
PHOSPHATE SERPL-MCNC: 3.8 MG/DL — SIGNIFICANT CHANGE UP (ref 2.4–4.7)
PLATELET # BLD AUTO: 245 K/UL — SIGNIFICANT CHANGE UP (ref 150–400)
POTASSIUM SERPL-MCNC: 4.3 MMOL/L — SIGNIFICANT CHANGE UP (ref 3.5–5.3)
POTASSIUM SERPL-SCNC: 4.3 MMOL/L — SIGNIFICANT CHANGE UP (ref 3.5–5.3)
RBC # BLD: 4.42 M/UL — SIGNIFICANT CHANGE UP (ref 3.8–5.2)
RBC # FLD: 13.3 % — SIGNIFICANT CHANGE UP (ref 10.3–14.5)
S AUREUS DNA NOSE QL NAA+PROBE: DETECTED
SODIUM SERPL-SCNC: 136 MMOL/L — SIGNIFICANT CHANGE UP (ref 135–145)
T3 SERPL-MCNC: 82 NG/DL — SIGNIFICANT CHANGE UP (ref 80–200)
T4 AB SER-ACNC: 7.5 UG/DL — SIGNIFICANT CHANGE UP (ref 4.5–12)
TRIGL SERPL-MCNC: 58 MG/DL — SIGNIFICANT CHANGE UP
TSH SERPL-MCNC: 0.58 UIU/ML — SIGNIFICANT CHANGE UP (ref 0.27–4.2)
WBC # BLD: 9.89 K/UL — SIGNIFICANT CHANGE UP (ref 3.8–10.5)
WBC # FLD AUTO: 9.89 K/UL — SIGNIFICANT CHANGE UP (ref 3.8–10.5)

## 2023-07-02 PROCEDURE — 99232 SBSQ HOSP IP/OBS MODERATE 35: CPT

## 2023-07-02 PROCEDURE — 99291 CRITICAL CARE FIRST HOUR: CPT

## 2023-07-02 PROCEDURE — 70450 CT HEAD/BRAIN W/O DYE: CPT | Mod: 26

## 2023-07-02 PROCEDURE — 93970 EXTREMITY STUDY: CPT | Mod: 26

## 2023-07-02 RX ORDER — METOPROLOL TARTRATE 50 MG
50 TABLET ORAL
Refills: 0 | Status: DISCONTINUED | OUTPATIENT
Start: 2023-07-02 | End: 2023-07-02

## 2023-07-02 RX ORDER — ATORVASTATIN CALCIUM 80 MG/1
40 TABLET, FILM COATED ORAL AT BEDTIME
Refills: 0 | Status: DISCONTINUED | OUTPATIENT
Start: 2023-07-02 | End: 2023-07-12

## 2023-07-02 RX ORDER — ESMOLOL HCL 100MG/10ML
50 VIAL (ML) INTRAVENOUS
Qty: 2500 | Refills: 0 | Status: DISCONTINUED | OUTPATIENT
Start: 2023-07-02 | End: 2023-07-02

## 2023-07-02 RX ORDER — DILTIAZEM HCL 120 MG
10 CAPSULE, EXT RELEASE 24 HR ORAL ONCE
Refills: 0 | Status: COMPLETED | OUTPATIENT
Start: 2023-07-02 | End: 2023-07-02

## 2023-07-02 RX ORDER — ACETAMINOPHEN 500 MG
650 TABLET ORAL EVERY 6 HOURS
Refills: 0 | Status: DISCONTINUED | OUTPATIENT
Start: 2023-07-02 | End: 2023-07-12

## 2023-07-02 RX ORDER — DILTIAZEM HCL 120 MG
60 CAPSULE, EXT RELEASE 24 HR ORAL EVERY 6 HOURS
Refills: 0 | Status: DISCONTINUED | OUTPATIENT
Start: 2023-07-02 | End: 2023-07-03

## 2023-07-02 RX ORDER — PHENYLEPHRINE HYDROCHLORIDE 10 MG/ML
0.1 INJECTION INTRAVENOUS
Qty: 40 | Refills: 0 | Status: DISCONTINUED | OUTPATIENT
Start: 2023-07-02 | End: 2023-07-02

## 2023-07-02 RX ORDER — SODIUM CHLORIDE 9 MG/ML
500 INJECTION INTRAMUSCULAR; INTRAVENOUS; SUBCUTANEOUS ONCE
Refills: 0 | Status: COMPLETED | OUTPATIENT
Start: 2023-07-02 | End: 2023-07-02

## 2023-07-02 RX ORDER — METOPROLOL TARTRATE 50 MG
25 TABLET ORAL EVERY 6 HOURS
Refills: 0 | Status: DISCONTINUED | OUTPATIENT
Start: 2023-07-02 | End: 2023-07-03

## 2023-07-02 RX ORDER — SENNA PLUS 8.6 MG/1
2 TABLET ORAL AT BEDTIME
Refills: 0 | Status: DISCONTINUED | OUTPATIENT
Start: 2023-07-02 | End: 2023-07-12

## 2023-07-02 RX ORDER — DILTIAZEM HCL 120 MG
30 CAPSULE, EXT RELEASE 24 HR ORAL EVERY 6 HOURS
Refills: 0 | Status: DISCONTINUED | OUTPATIENT
Start: 2023-07-02 | End: 2023-07-02

## 2023-07-02 RX ORDER — DILTIAZEM HCL 120 MG
5 CAPSULE, EXT RELEASE 24 HR ORAL ONCE
Refills: 0 | Status: COMPLETED | OUTPATIENT
Start: 2023-07-02 | End: 2023-07-02

## 2023-07-02 RX ORDER — DIGOXIN 250 MCG
250 TABLET ORAL EVERY 6 HOURS
Refills: 0 | Status: DISCONTINUED | OUTPATIENT
Start: 2023-07-02 | End: 2023-07-02

## 2023-07-02 RX ORDER — POLYETHYLENE GLYCOL 3350 17 G/17G
17 POWDER, FOR SOLUTION ORAL DAILY
Refills: 0 | Status: DISCONTINUED | OUTPATIENT
Start: 2023-07-02 | End: 2023-07-12

## 2023-07-02 RX ORDER — ASPIRIN/CALCIUM CARB/MAGNESIUM 324 MG
81 TABLET ORAL DAILY
Refills: 0 | Status: DISCONTINUED | OUTPATIENT
Start: 2023-07-02 | End: 2023-07-09

## 2023-07-02 RX ORDER — MUPIROCIN 20 MG/G
1 OINTMENT TOPICAL EVERY 12 HOURS
Refills: 0 | Status: COMPLETED | OUTPATIENT
Start: 2023-07-02 | End: 2023-07-07

## 2023-07-02 RX ORDER — ENOXAPARIN SODIUM 100 MG/ML
40 INJECTION SUBCUTANEOUS
Refills: 0 | Status: DISCONTINUED | OUTPATIENT
Start: 2023-07-02 | End: 2023-07-09

## 2023-07-02 RX ADMIN — ENOXAPARIN SODIUM 40 MILLIGRAM(S): 100 INJECTION SUBCUTANEOUS at 21:10

## 2023-07-02 RX ADMIN — Medication 650 MILLIGRAM(S): at 01:22

## 2023-07-02 RX ADMIN — Medication 50 MILLIGRAM(S): at 05:37

## 2023-07-02 RX ADMIN — ATORVASTATIN CALCIUM 40 MILLIGRAM(S): 80 TABLET, FILM COATED ORAL at 21:10

## 2023-07-02 RX ADMIN — Medication 204 MICROGRAM(S): at 02:43

## 2023-07-02 RX ADMIN — SODIUM CHLORIDE 500 MILLILITER(S): 9 INJECTION INTRAMUSCULAR; INTRAVENOUS; SUBCUTANEOUS at 04:05

## 2023-07-02 RX ADMIN — Medication 650 MILLIGRAM(S): at 00:26

## 2023-07-02 RX ADMIN — Medication 60 MILLIGRAM(S): at 14:20

## 2023-07-02 RX ADMIN — MUPIROCIN 1 APPLICATION(S): 20 OINTMENT TOPICAL at 17:27

## 2023-07-02 RX ADMIN — SODIUM CHLORIDE 50 MILLILITER(S): 9 INJECTION INTRAMUSCULAR; INTRAVENOUS; SUBCUTANEOUS at 04:05

## 2023-07-02 RX ADMIN — Medication 10 MILLIGRAM(S): at 07:35

## 2023-07-02 RX ADMIN — Medication 25 MILLIGRAM(S): at 17:27

## 2023-07-02 RX ADMIN — SODIUM CHLORIDE 1000 MILLILITER(S): 9 INJECTION INTRAMUSCULAR; INTRAVENOUS; SUBCUTANEOUS at 18:21

## 2023-07-02 RX ADMIN — Medication 30 MILLIGRAM(S): at 09:26

## 2023-07-02 RX ADMIN — Medication 5 MILLIGRAM(S): at 00:06

## 2023-07-02 RX ADMIN — SENNA PLUS 2 TABLET(S): 8.6 TABLET ORAL at 21:10

## 2023-07-02 RX ADMIN — Medication 60 MILLIGRAM(S): at 21:10

## 2023-07-02 RX ADMIN — Medication 5 MILLIGRAM(S): at 13:48

## 2023-07-02 RX ADMIN — Medication 30.2 MICROGRAM(S)/KG/MIN: at 01:31

## 2023-07-02 RX ADMIN — PHENYLEPHRINE HYDROCHLORIDE 3.78 MICROGRAM(S)/KG/MIN: 10 INJECTION INTRAVENOUS at 04:05

## 2023-07-02 RX ADMIN — Medication 25 MILLIGRAM(S): at 11:41

## 2023-07-02 RX ADMIN — Medication 5 MILLIGRAM(S): at 06:31

## 2023-07-02 NOTE — CONSULT NOTE ADULT - SUBJECTIVE AND OBJECTIVE BOX
Neurology consult    WINNIE PEREZ 62y Female     Patient is a 62y old  Female who presents with a chief complaint of stroke with thrombus (02 Jul 2023 08:12)      HPI:  This is a 62y Female  last seen well at 1200 noon. Developed Left sided numbness and weakness.  CT CTA CTP revealed R P!- P2 occlusion with no core and 40 ml penumbra.  Patient was given tenecteplase at [facility] and is being transferred to [facility] for possible thrombectomy.  NIH 2 ICH 0 mod ranikin 1   (01 Jul 2023 18:05)      PMH: HTN (hypertension)         PSH: H/O carpal tunnel repair          FAMILY HISTORY:  Family history of stroke or transient ischemic attack in mother (Mother, Sibling)    FH: diabetes mellitus (Mother)        SOCIAL HISTORY:  No history of tobacco or alcohol use     Allergies    azithromycin (Hives; Rash)  penicillin (Hives; Rash)    Intolerances        Height (cm): 172.7 (07-01 @ 17:52)  Weight (kg): 100.8 (07-01 @ 17:52)  BMI (kg/m2): 33.8 (07-01 @ 17:52)    Vital Signs Last 24 Hrs  T(C): 36.7 (02 Jul 2023 11:53), Max: 36.8 (02 Jul 2023 04:38)  T(F): 98.1 (02 Jul 2023 11:53), Max: 98.2 (02 Jul 2023 04:38)  HR: 117 (02 Jul 2023 14:45) (100 - 154)  BP: 109/86 (02 Jul 2023 14:45) (93/80 - 190/158)  BP(mean): 95 (02 Jul 2023 14:45) (72 - 169)  RR: 20 (02 Jul 2023 14:45) (14 - 26)  SpO2: 94% (02 Jul 2023 14:45) (88% - 100%)    Parameters below as of 02 Jul 2023 14:00  Patient On (Oxygen Delivery Method): room air      MEDICATIONS    acetaminophen     Tablet .. 650 milliGRAM(s) Oral every 6 hours PRN  atorvastatin 40 milliGRAM(s) Oral at bedtime  chlorhexidine 2% Cloths 1 Application(s) Topical daily  diltiazem    Tablet 60 milliGRAM(s) Oral every 6 hours  hydrALAZINE Injectable 10 milliGRAM(s) IV Push every 2 hours PRN  metoprolol tartrate 25 milliGRAM(s) Oral every 6 hours  mupirocin 2% Nasal 1 Application(s) Both Nostrils every 12 hours  ondansetron Injectable 4 milliGRAM(s) IV Push every 6 hours PRN  phenylephrine    Infusion 0.1 MICROgram(s)/kG/Min IV Continuous <Continuous>  polyethylene glycol 3350 17 Gram(s) Oral daily  senna 2 Tablet(s) Oral at bedtime        LABS:  CBC Full  -  ( 02 Jul 2023 03:50 )  WBC Count : 9.89 K/uL  RBC Count : 4.42 M/uL  Hemoglobin : 12.8 g/dL  Hematocrit : 37.9 %  Platelet Count - Automated : 245 K/uL  Mean Cell Volume : 85.7 fl  Mean Cell Hemoglobin : 29.0 pg  Mean Cell Hemoglobin Concentration : 33.8 gm/dL  Auto Neutrophil # : x  Auto Lymphocyte # : x  Auto Monocyte # : x  Auto Eosinophil # : x  Auto Basophil # : x  Auto Neutrophil % : x  Auto Lymphocyte % : x  Auto Monocyte % : x  Auto Eosinophil % : x  Auto Basophil % : x    Urinalysis Basic - ( 02 Jul 2023 03:50 )    Color: x / Appearance: x / SG: x / pH: x  Gluc: 129 mg/dL / Ketone: x  / Bili: x / Urobili: x   Blood: x / Protein: x / Nitrite: x   Leuk Esterase: x / RBC: x / WBC x   Sq Epi: x / Non Sq Epi: x / Bacteria: x      07-02    136  |  103  |  17.3  ----------------------------<  129<H>  4.3   |  20.0<L>  |  0.67    Ca    8.4      02 Jul 2023 03:50  Phos  3.8     07-02  Mg     2.1     07-02        Hemoglobin A1C:   Lipid Panel 07-02 @ 03:50  Total Cholesterol, Serum 146  LDL --  Triglycerides 58      On Neurological Examination:    Mental Status - Patient is  awake, alert and oriented X3.  Speech is fluent. Patient can name, repeat and follow commands correctly  There is no dysarthria.    Cranial Nerves - PERRL, EOMI,  Visual fields are full to finger counting, no gross facial asymmetry, tongue/uvula midline    Motor Exam -   Right upper ---5/5 No drift  Left upper ---5/5 slight drift.  Right lower ---5/5 No drift  Left lower  ---5/5 No drift     nml bulk/tone    Sensory    Intact to light touch and pinprick bilaterally    Coord: FTN intact bilaterally     Gait -  Not assessed.     Reflexes:                     NIH SS:   Date: 07-  Time: 1005  1a) Level of consciousness (0-3):   1b) Questions (0-2):   1c) Commands (0-2):   2  ) Gaze (0-2):   3  ) Visual field (0-3):   4  ) Facial palsy (0-3):   Motor  5a) Left arm (0-4): ----------------------1  5b) Right arm (0-4):   6a) Left leg (0-4):   6b) Right leg (0-4):   7  ) Ataxia (0-2):   Sensory  8  ) Sensory (0-2):   Speech  9  ) Language (0-3):   10) Dysarthria (0-2):   Extinction  11) Extinction/inattention (0-2):     Total score: =-------------------------1    Patient was last seen well at 1200 noon on 7-01-23  Prestroke Modified Roopa: 1    RADIOLOGY ( All neurological imaging studies were independently reviewed and interpreted by me)  [facility]   CTA  CTP    IMPRESSION:    CT PERFUSION: Moderate sized right PCA ischemic penumbra involving the right  occipital lobe and posterior right mesial temporal lobe measuring  approximately 40 cc. No significant core infarction.    CTA BRAIN: *Near-complete occlusion of the right PCA at the P1/P2 junction,  corresponding to ischemic penumbra on concurrent perfusion..    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 No  associated calcification or thrombus.    CTA NECK: Patent cervical vasculature. No flow limiting stenosis or  occlusion.    Acute findings were discussed with Dr. Awan in the [place] ED at 1:03 PM on  7/1/2023, who indicated that the communication was understood.    --- End of Report ---              TAMERA SPRING MD; Attending Radiologist  MRI:  TTE                 Neurology consult    WINNIE PEREZ 62y Female     Patient is a 62y old  Female who presents with a chief complaint of stroke with thrombus (02 Jul 2023 08:12)      HPI:  This is a 62y Female  last seen well at 1200 noon. Developed Left sided numbness and weakness.  CT CTA CTP revealed R P!- P2 occlusion with no core and 40 ml penumbra.  Patient was given tenecteplase at [facility] and is being transferred to [facility] for possible thrombectomy.  NIH 2 ICH 0 mod ranikin 1   (01 Jul 2023 18:05)      PMH: HTN (hypertension)         PSH: H/O carpal tunnel repair          FAMILY HISTORY:  Family history of stroke or transient ischemic attack in mother (Mother, Sibling)    FH: diabetes mellitus (Mother)        SOCIAL HISTORY:  No history of tobacco or alcohol use     Allergies    azithromycin (Hives; Rash)  penicillin (Hives; Rash)    Intolerances        Height (cm): 172.7 (07-01 @ 17:52)  Weight (kg): 100.8 (07-01 @ 17:52)  BMI (kg/m2): 33.8 (07-01 @ 17:52)    Vital Signs Last 24 Hrs  T(C): 36.7 (02 Jul 2023 11:53), Max: 36.8 (02 Jul 2023 04:38)  T(F): 98.1 (02 Jul 2023 11:53), Max: 98.2 (02 Jul 2023 04:38)  HR: 117 (02 Jul 2023 14:45) (100 - 154)  BP: 109/86 (02 Jul 2023 14:45) (93/80 - 190/158)  BP(mean): 95 (02 Jul 2023 14:45) (72 - 169)  RR: 20 (02 Jul 2023 14:45) (14 - 26)  SpO2: 94% (02 Jul 2023 14:45) (88% - 100%)    Parameters below as of 02 Jul 2023 14:00  Patient On (Oxygen Delivery Method): room air      MEDICATIONS    acetaminophen     Tablet .. 650 milliGRAM(s) Oral every 6 hours PRN  atorvastatin 40 milliGRAM(s) Oral at bedtime  chlorhexidine 2% Cloths 1 Application(s) Topical daily  diltiazem    Tablet 60 milliGRAM(s) Oral every 6 hours  hydrALAZINE Injectable 10 milliGRAM(s) IV Push every 2 hours PRN  metoprolol tartrate 25 milliGRAM(s) Oral every 6 hours  mupirocin 2% Nasal 1 Application(s) Both Nostrils every 12 hours  ondansetron Injectable 4 milliGRAM(s) IV Push every 6 hours PRN  phenylephrine    Infusion 0.1 MICROgram(s)/kG/Min IV Continuous <Continuous>  polyethylene glycol 3350 17 Gram(s) Oral daily  senna 2 Tablet(s) Oral at bedtime        LABS:  CBC Full  -  ( 02 Jul 2023 03:50 )  WBC Count : 9.89 K/uL  RBC Count : 4.42 M/uL  Hemoglobin : 12.8 g/dL  Hematocrit : 37.9 %  Platelet Count - Automated : 245 K/uL  Mean Cell Volume : 85.7 fl  Mean Cell Hemoglobin : 29.0 pg  Mean Cell Hemoglobin Concentration : 33.8 gm/dL  Auto Neutrophil # : x  Auto Lymphocyte # : x  Auto Monocyte # : x  Auto Eosinophil # : x  Auto Basophil # : x  Auto Neutrophil % : x  Auto Lymphocyte % : x  Auto Monocyte % : x  Auto Eosinophil % : x  Auto Basophil % : x    Urinalysis Basic - ( 02 Jul 2023 03:50 )    Color: x / Appearance: x / SG: x / pH: x  Gluc: 129 mg/dL / Ketone: x  / Bili: x / Urobili: x   Blood: x / Protein: x / Nitrite: x   Leuk Esterase: x / RBC: x / WBC x   Sq Epi: x / Non Sq Epi: x / Bacteria: x      07-02    136  |  103  |  17.3  ----------------------------<  129<H>  4.3   |  20.0<L>  |  0.67    Ca    8.4      02 Jul 2023 03:50  Phos  3.8     07-02  Mg     2.1     07-02        Hemoglobin A1C:   Lipid Panel 07-02 @ 03:50  Total Cholesterol, Serum 146  LDL --  Triglycerides 58      On Neurological Examination:    Mental Status - Patient is  awake, alert and oriented X3.  Speech is fluent. Patient can name, repeat and follow commands correctly  There is no dysarthria.    Cranial Nerves - PERRL, EOMI,  Visual fields are full to finger counting, no gross facial asymmetry, tongue/uvula midline    Motor Exam -   Right upper ---5/5 No drift  Left upper ---5/5 slight drift.  Right lower ---5/5 No drift  Left lower  ---5/5 No drift     nml bulk/tone    Sensory    Intact to light touch and pinprick bilaterally    Coord: FTN intact bilaterally     Gait -  Not assessed.     Reflexes:                     NIH SS:   Date: 07-  Time: 1005  1a) Level of consciousness (0-3):   1b) Questions (0-2):   1c) Commands (0-2):   2  ) Gaze (0-2):   3  ) Visual field (0-3):   4  ) Facial palsy (0-3):   Motor  5a) Left arm (0-4): ----------------------1  5b) Right arm (0-4):   6a) Left leg (0-4):   6b) Right leg (0-4):   7  ) Ataxia (0-2):   Sensory  8  ) Sensory (0-2):   Speech  9  ) Language (0-3):   10) Dysarthria (0-2):   Extinction  11) Extinction/inattention (0-2):     Total score: =-------------------------1    Patient was last seen well at 1200 noon on 7-01-23  Prestroke Modified Roopa: 1    RADIOLOGY ( All neurological imaging studies were independently reviewed and interpreted by me)  [facility]  07/01/2023  IMPRESSION:    No acute intracranial bleeding.    TAMERA SPRING MD; Attending Radiologist    CTA CTP 07/01/2023  IMPRESSION:    CT PERFUSION: Moderate sized right PCA ischemic penumbra involving the right  occipital lobe and posterior right mesial temporal lobe measuring  approximately 40 cc. No significant core infarction.    CTA BRAIN: *Near-complete occlusion of the right PCA at the P1/P2 junction,  corresponding to ischemic penumbra on concurrent perfusion..    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 No  associated calcification or thrombus.    CTA NECK: Patent cervical vasculature. No flow limiting stenosis or  occlusion.    Acute findings were discussed with Dr. Awan in the [place] ED at 1:03 PM on  7/1/2023, who indicated that the communication was understood.      TAMERA SPRING MD; Attending Radiologist      CT Head     CT Head No Cont (07.02.23 @ 14:07) >    IMPRESSION:    New focus of low density in the right superior cerebellar hemisphere   suggesting the presence of acute infarction (2-15). No david hemorrhagic   transformation.    Dr. Smith discussed these findings with physician's assistant Bety on   7/2/2023 2:42 PM with read back.      DORA SMITH MD; Attending Radiologist    MRI:      TTE

## 2023-07-02 NOTE — PROGRESS NOTE ADULT - SUBJECTIVE AND OBJECTIVE BOX
Interval events:  With Afib with RVR overnight despite metoprolol PO, metoprolol pushes and diltiazem pushes. Requiring low dose epi.   This morning patient reports that the L facial numbness has resolved, still has some L finger tip numbness.     VITALS:  T(C): , Max: 36.8 (07-02-23 @ 04:38)  HR:  (100 - 144)  BP:  (96/81 - 138/109)  ABP: --  RR:  (16 - 26)  SpO2:  (93% - 100%)  Wt(kg): --      07-01-23 @ 07:01  -  07-02-23 @ 07:00  --------------------------------------------------------  IN: 2549.5 mL / OUT: 396 mL / NET: 2153.5 mL      LABS:  Na: 136 (07-02 @ 03:50), 135 (07-01 @ 18:50)  K: 4.3 (07-02 @ 03:50), 4.4 (07-01 @ 18:50)  Cl: 103 (07-02 @ 03:50), 100 (07-01 @ 18:50)  CO2: 20.0 (07-02 @ 03:50), 21.0 (07-01 @ 18:50)  BUN: 17.3 (07-02 @ 03:50), 15.8 (07-01 @ 18:50)  Cr: 0.67 (07-02 @ 03:50), 0.71 (07-01 @ 18:50)  Glu: 129(07-02 @ 03:50), 164(07-01 @ 18:50)    Hgb: 12.8 (07-02 @ 03:50), 14.5 (07-01 @ 18:50)  Hct: 37.9 (07-02 @ 03:50), 42.7 (07-01 @ 18:50)  WBC: 9.89 (07-02 @ 03:50), 17.84 (07-01 @ 18:50)  Plt: 245 (07-02 @ 03:50), 325 (07-01 @ 18:50)    MEDICATIONS:  acetaminophen     Tablet .. 650 milliGRAM(s) Oral every 6 hours PRN  chlorhexidine 2% Cloths 1 Application(s) Topical daily  digoxin  IVPB 250 MICROGram(s) IV Intermittent every 6 hours  hydrALAZINE Injectable 10 milliGRAM(s) IV Push every 2 hours PRN  metoprolol tartrate 50 milliGRAM(s) Oral two times a day  metoprolol tartrate Injectable 5 milliGRAM(s) IV Push every 6 hours PRN  ondansetron Injectable 4 milliGRAM(s) IV Push every 6 hours PRN  phenylephrine    Infusion 0.1 MICROgram(s)/kG/Min IV Continuous <Continuous>  sodium chloride 0.9%. 1000 milliLiter(s) IV Continuous <Continuous>    EXAMINATION:  General:  in NAD  HEENT:  MMM  Neuro:  awake, alert, oriented x 3, follows commands, EOMI, VFF to finger counting, face sensation intact b/l, face symmetric, no PD, minimal L leg drift, minimal numbness in L finger tips  Cards:  irregular irregular   Respiratory:  no respiratory distress  Abdomen:  soft  Extremities:  no LE edema    Assessment/Plan:   63 yo woman with h/o HTN who was admitted 7/1 with L sided numbness and L homonomous hemianopsia, s/p Tenecteplase, CTA with a R P2 occlusion s/p MT with TICI 3 recanalization. Found to have new onset Afib with RVR.   Stroke w/u: LDL 91, HgA1C 5. Mechanism of stroke is cardioembolic.     Neuro:  q1h NC  CTH at 24 hours from Tenecteplase  start atorvastatin 40 mg daily, hold ASA  until CTH    CV: Afib with RVR  SBP goal 100-140, c/w epi PRN  c/w metoprolol 25 mg q6h, s/p digoxin. Start diltiazem 30 mg q6h  cardiology following   holding home losartan 100 mg daily, HCTZ 25 mg daily and amlodipine 5 mg daily   holding home bisoprolol 10 mg daily    Pulm:  STEW    GI:  regular diet  senna and miralax  no indication for PPI    Renal:  IVL  TOV    Heme:  SCDs, hold lov until CTH   LE dopplers     ID:  STEW    Endo: HgA1C 5  FS goal 120-180    Patient seen and examined by attending on 7/2/2023.    Patient is critically ill due to acute R PCA stroke s/p tenecteplase and mechanical thrombectomy and at high risk for neurological deterioration or death due to: potential complications such as hemorrhagic conversion of stroke, requiring pressors for Afib with RVR.

## 2023-07-03 LAB
ANION GAP SERPL CALC-SCNC: 13 MMOL/L — SIGNIFICANT CHANGE UP (ref 5–17)
BUN SERPL-MCNC: 27.4 MG/DL — HIGH (ref 8–20)
CALCIUM SERPL-MCNC: 8.3 MG/DL — LOW (ref 8.4–10.5)
CHLORIDE SERPL-SCNC: 102 MMOL/L — SIGNIFICANT CHANGE UP (ref 96–108)
CO2 SERPL-SCNC: 20 MMOL/L — LOW (ref 22–29)
CREAT SERPL-MCNC: 0.86 MG/DL — SIGNIFICANT CHANGE UP (ref 0.5–1.3)
EGFR: 75 ML/MIN/1.73M2 — SIGNIFICANT CHANGE UP
FSP PPP-MCNC: >=20 UG/ML
GLUCOSE SERPL-MCNC: 99 MG/DL — SIGNIFICANT CHANGE UP (ref 70–99)
HCT VFR BLD CALC: 35.8 % — SIGNIFICANT CHANGE UP (ref 34.5–45)
HGB BLD-MCNC: 12 G/DL — SIGNIFICANT CHANGE UP (ref 11.5–15.5)
MAGNESIUM SERPL-MCNC: 2.1 MG/DL — SIGNIFICANT CHANGE UP (ref 1.6–2.6)
MCHC RBC-ENTMCNC: 29.1 PG — SIGNIFICANT CHANGE UP (ref 27–34)
MCHC RBC-ENTMCNC: 33.5 GM/DL — SIGNIFICANT CHANGE UP (ref 32–36)
MCV RBC AUTO: 86.9 FL — SIGNIFICANT CHANGE UP (ref 80–100)
PHOSPHATE SERPL-MCNC: 2.6 MG/DL — SIGNIFICANT CHANGE UP (ref 2.4–4.7)
PLATELET # BLD AUTO: 193 K/UL — SIGNIFICANT CHANGE UP (ref 150–400)
POTASSIUM SERPL-MCNC: 4.1 MMOL/L — SIGNIFICANT CHANGE UP (ref 3.5–5.3)
POTASSIUM SERPL-SCNC: 4.1 MMOL/L — SIGNIFICANT CHANGE UP (ref 3.5–5.3)
RBC # BLD: 4.12 M/UL — SIGNIFICANT CHANGE UP (ref 3.8–5.2)
RBC # FLD: 13.6 % — SIGNIFICANT CHANGE UP (ref 10.3–14.5)
SODIUM SERPL-SCNC: 135 MMOL/L — SIGNIFICANT CHANGE UP (ref 135–145)
WBC # BLD: 11.33 K/UL — HIGH (ref 3.8–10.5)
WBC # FLD AUTO: 11.33 K/UL — HIGH (ref 3.8–10.5)

## 2023-07-03 PROCEDURE — 93306 TTE W/DOPPLER COMPLETE: CPT | Mod: 26

## 2023-07-03 PROCEDURE — 99232 SBSQ HOSP IP/OBS MODERATE 35: CPT

## 2023-07-03 PROCEDURE — 99233 SBSQ HOSP IP/OBS HIGH 50: CPT

## 2023-07-03 RX ORDER — SODIUM CHLORIDE 9 MG/ML
1000 INJECTION INTRAMUSCULAR; INTRAVENOUS; SUBCUTANEOUS ONCE
Refills: 0 | Status: DISCONTINUED | OUTPATIENT
Start: 2023-07-03 | End: 2023-07-03

## 2023-07-03 RX ORDER — SODIUM,POTASSIUM PHOSPHATES 278-250MG
1 POWDER IN PACKET (EA) ORAL ONCE
Refills: 0 | Status: COMPLETED | OUTPATIENT
Start: 2023-07-03 | End: 2023-07-03

## 2023-07-03 RX ORDER — DILTIAZEM HCL 120 MG
60 CAPSULE, EXT RELEASE 24 HR ORAL EVERY 6 HOURS
Refills: 0 | Status: DISCONTINUED | OUTPATIENT
Start: 2023-07-03 | End: 2023-07-05

## 2023-07-03 RX ORDER — HYDRALAZINE HCL 50 MG
10 TABLET ORAL EVERY 4 HOURS
Refills: 0 | Status: DISCONTINUED | OUTPATIENT
Start: 2023-07-03 | End: 2023-07-12

## 2023-07-03 RX ORDER — METOPROLOL TARTRATE 50 MG
25 TABLET ORAL EVERY 6 HOURS
Refills: 0 | Status: DISCONTINUED | OUTPATIENT
Start: 2023-07-03 | End: 2023-07-04

## 2023-07-03 RX ORDER — SODIUM CHLORIDE 9 MG/ML
500 INJECTION INTRAMUSCULAR; INTRAVENOUS; SUBCUTANEOUS ONCE
Refills: 0 | Status: COMPLETED | OUTPATIENT
Start: 2023-07-03 | End: 2023-07-03

## 2023-07-03 RX ORDER — SODIUM CHLORIDE 9 MG/ML
1000 INJECTION INTRAMUSCULAR; INTRAVENOUS; SUBCUTANEOUS ONCE
Refills: 0 | Status: COMPLETED | OUTPATIENT
Start: 2023-07-03 | End: 2023-07-03

## 2023-07-03 RX ADMIN — ATORVASTATIN CALCIUM 40 MILLIGRAM(S): 80 TABLET, FILM COATED ORAL at 22:29

## 2023-07-03 RX ADMIN — Medication 25 MILLIGRAM(S): at 00:12

## 2023-07-03 RX ADMIN — Medication 25 MILLIGRAM(S): at 17:52

## 2023-07-03 RX ADMIN — MUPIROCIN 1 APPLICATION(S): 20 OINTMENT TOPICAL at 06:25

## 2023-07-03 RX ADMIN — ENOXAPARIN SODIUM 40 MILLIGRAM(S): 100 INJECTION SUBCUTANEOUS at 22:30

## 2023-07-03 RX ADMIN — SENNA PLUS 2 TABLET(S): 8.6 TABLET ORAL at 22:30

## 2023-07-03 RX ADMIN — SODIUM CHLORIDE 500 MILLILITER(S): 9 INJECTION INTRAMUSCULAR; INTRAVENOUS; SUBCUTANEOUS at 02:22

## 2023-07-03 RX ADMIN — Medication 25 MILLIGRAM(S): at 11:08

## 2023-07-03 RX ADMIN — POLYETHYLENE GLYCOL 3350 17 GRAM(S): 17 POWDER, FOR SOLUTION ORAL at 15:00

## 2023-07-03 RX ADMIN — Medication 60 MILLIGRAM(S): at 03:54

## 2023-07-03 RX ADMIN — Medication 60 MILLIGRAM(S): at 09:56

## 2023-07-03 RX ADMIN — Medication 25 MILLIGRAM(S): at 06:25

## 2023-07-03 RX ADMIN — Medication 650 MILLIGRAM(S): at 14:10

## 2023-07-03 RX ADMIN — MUPIROCIN 1 APPLICATION(S): 20 OINTMENT TOPICAL at 17:52

## 2023-07-03 RX ADMIN — CHLORHEXIDINE GLUCONATE 1 APPLICATION(S): 213 SOLUTION TOPICAL at 06:43

## 2023-07-03 RX ADMIN — Medication 60 MILLIGRAM(S): at 22:29

## 2023-07-03 RX ADMIN — SODIUM CHLORIDE 1000 MILLILITER(S): 9 INJECTION INTRAMUSCULAR; INTRAVENOUS; SUBCUTANEOUS at 15:00

## 2023-07-03 RX ADMIN — Medication 650 MILLIGRAM(S): at 15:10

## 2023-07-03 RX ADMIN — Medication 81 MILLIGRAM(S): at 11:08

## 2023-07-03 RX ADMIN — Medication 1 PACKET(S): at 06:26

## 2023-07-03 NOTE — PHARMACOTHERAPY INTERVENTION NOTE - INTERVENTION CATEGORIES
Patient attended session 10 of Cardiac Rehab Phase 2. See scanned in exercise session report in the Media tab.    
Monitoring
Med Reconciliation

## 2023-07-03 NOTE — OCCUPATIONAL THERAPY INITIAL EVALUATION ADULT - ADDITIONAL COMMENTS
Pt has a shower with curtains, grab bars and built in seat. Pt was independent PTA without an assist device. Pt owns no DME. Pt is right handed and drives.

## 2023-07-03 NOTE — SPEECH LANGUAGE PATHOLOGY EVALUATION - COMMENTS
Informally, skills clinically judged to be WFL As per MD note: "61 yo woman with h/o HTN who was admitted 7/1 with L sided numbness and L homonomous hemianopsia, s/p Tenecteplase, CTA with a R P2 occlusion s/p MT with TICI 3 recanalization. Found to have new onset Afib with RVR.   Stroke w/u: LDL 91, HgA1C 5. Mechanism of stroke is cardioembolic." Clinically judged to be appropriate, given age & gender

## 2023-07-03 NOTE — PROGRESS NOTE ADULT - ASSESSMENT
INCOMPLETE   ASSESSMENT: This is 63 y/o female with hx of HTN, remote hyperthyroid, family hx of CVA (mother, brother) who presented to [facility] with left-sided numbness, weakness and slurred speech on the evening of 7/1/23.  CT head without acute infarction or hemorrhage, CT Angio revealed right PCA near-complete occlusion at P1/P2 junction. Pt was given tenecteplase and she was then transferred to [facility] where she underwent mechanical thrombectomy with TICI 3 recanalization.   Pt was found to have Afib with RVR on telemetry. Repeat CT head with hypodensity in right superior cerebellar region concerning for acute infarction. Etiology likely cardioembolic in setting of atrial fibrillation.     NEURO: neurologically ---, Continue close monitoring for neurologic deterioration , stroke neuro checks q _ ,    SBP goal , titrate statin to LDL goal less than 70, MRI Brain w/o,  t. Physical therapy/OT/Speech eval/treatment.     ANTITHROMBOTIC THERAPY:     PULMONARY: protecting airway, saturating well     CARDIOVASCULAR: check TTE, cardiac monitoring , cardiology follow up appreciated                           GASTROINTESTINAL:  dysphagia screen  passed     RENAL: BUN slightly elevated/Cr within range, monitor urine output, maintain adequate hydration       Na Goal:  135-145     Emery:    HEMATOLOGY: H/H without anemia, Platelets 193, patient should have all age and risk appropriate malignancy screenings with PCP or sooner if clinically suspected      DVT ppx: Heparin s.c [] LMWH [x]     ID: afebrile,  leukocytosis, monitor for si/sx of infection     OTHER:  condition and plan of care d/w patient, questsions and concerns addressed.     DISPOSITION: Rehab or home depending on PT eval once stable and workup is complete      CORE MEASURES:        Admission NIHSS:  2     Tenecteplase : [x] YES [] NO      LDL/HDL/A1C: 91/43/5.0     Depression Screen- if depression hx and/or present      Statin Therapy:  as noted      Dysphagia Screen: [x] PASS [] FAIL     Smoking [] YES [] NO      Afib [x] YES [] NO     Stroke Education [x] YES [] NO    Obtain screening lower extremity venous ultrasound in patients who meet 1 or more of the following criteria as patient is high risk for DVT/PE on admission:   [] History of DVT/PE  []Hypercoagulable states (Factor V Leiden, Cancer, OCP, etc. )  []Prolonged immobility (hemiplegia/hemiparesis/post operative or any other extended immobilization)  [] Transferred from outside facility (Rehab or Long term care)  [] Age </= to 50    ASSESSMENT: This is 61 y/o female with hx of HTN, remote hyperthyroid, family hx of CVA (mother, brother) who presented to [facility] with left-sided numbness, weakness and slurred speech on the evening of 7/1/23.  CT head without acute infarction or hemorrhage, CT Angio revealed right PCA near-complete occlusion at P1/P2 junction. Pt was given tenecteplase and she was then transferred to [facility] where she underwent mechanical thrombectomy with TICI 3 recanalization.   Pt was found to have Afib with RVR on telemetry. Repeat CT head with hypodensity in right superior cerebellar region concerning for acute infarction. Etiology likely cardioembolic in setting of atrial fibrillation.     NEURO: neurologically ---, Continue close monitoring for neurologic deterioration , stroke neuro checks q _ ,  Follow up with Dr. Zhou re: incidental aneurysm upon discharge,   SBP goal , titrate statin to LDL goal less than 70, MRI Brain w/o,  t. Physical therapy/OT/Speech eval/treatment.     ANTITHROMBOTIC THERAPY:     PULMONARY: protecting airway, saturating well     CARDIOVASCULAR: check TTE, cardiac monitoring , cardiology follow up appreciated                           GASTROINTESTINAL:  dysphagia screen  passed     RENAL: BUN slightly elevated/Cr within range, monitor urine output, maintain adequate hydration       Na Goal:  135-145     Emery:    HEMATOLOGY: H/H without anemia, Platelets 193, patient should have all age and risk appropriate malignancy screenings with PCP or sooner if clinically suspected      DVT ppx: Heparin s.c [] LMWH [x]     ID: afebrile,  leukocytosis, monitor for si/sx of infection     OTHER:  condition and plan of care d/w patient, questsions and concerns addressed.     DISPOSITION: Rehab or home depending on PT eval once stable and workup is complete      CORE MEASURES:        Admission NIHSS:  2     Tenecteplase : [x] YES [] NO      LDL/HDL/A1C: 91/43/5.0     Depression Screen- if depression hx and/or present      Statin Therapy:  as noted      Dysphagia Screen: [x] PASS [] FAIL     Smoking [] YES [] NO      Afib [x] YES [] NO     Stroke Education [x] YES [] NO    Obtain screening lower extremity venous ultrasound in patients who meet 1 or more of the following criteria as patient is high risk for DVT/PE on admission:   [] History of DVT/PE  []Hypercoagulable states (Factor V Leiden, Cancer, OCP, etc. )  []Prolonged immobility (hemiplegia/hemiparesis/post operative or any other extended immobilization)  [] Transferred from outside facility (Rehab or Long term care)  [] Age </= to 50    ASSESSMENT: This is 61 y/o female with hx of HTN, remote hyperthyroid, family hx of CVA (mother, brother) who presented to [facility] with left-sided numbness, weakness and slurred speech on the evening of 7/1/23.  CT head without acute infarction or hemorrhage, CT Angio revealed right PCA near-complete occlusion at P1/P2 junction. Pt was given tenecteplase and she was then transferred to [facility] where she underwent mechanical thrombectomy with TICI 3 recanalization.   Pt was found to have Afib with RVR on telemetry. Repeat CT head with hypodensity in right superior cerebellar region concerning for acute infarction. Etiology likely cardioembolic in setting of atrial fibrillation.     NEURO: neurologically overall improving, Continue close monitoring for neurologic deterioration , stroke neuro checks per post thrombectomy protocol then per ICU,  Follow up with Dr. Zhou re: incidental aneurysm upon discharge,   SBP goal 110-130mmHg being neurologically tolerated at this time avoid rapid fluctuations and hypotension, titrate statin to LDL goal less than 70, MRI Brain w/o pending, Physical therapy/OT/Speech eval/treatment.     ANTITHROMBOTIC THERAPY: ASA 81mg daily for now, pending clinical course and follow up neurological imaging will determine timeline for initiation of anticoagulation in setting of atrial fibrillation for secondary stroke prevention.     PULMONARY: protecting airway, saturating well     CARDIOVASCULAR: check TTE, cardiac monitoring to ensure rate control given recent RVR , cardiology follow up appreciated                           GASTROINTESTINAL:  dysphagia screen  passed, advance as tolerated per protocol      RENAL: BUN slightly elevated/Cr within range, monitor urine output, maintain adequate hydration       Na Goal:  135-145     Emery:    HEMATOLOGY: H/H without anemia, Platelets 193, patient should have all age and risk appropriate malignancy screenings with PCP or sooner if clinically suspected      DVT ppx: Heparin s.c [] LMWH [x]     ID: afebrile,  leukocytosis, monitor for si/sx of infection     OTHER:  condition and plan of care d/w patient, questions and concerns addressed.     DISPOSITION: Rehab or home depending on PT eval once stable and workup is complete      CORE MEASURES:        Admission NIHSS:  2     Tenecteplase : [x] YES [] NO      LDL/HDL/A1C: 91/43/5.0     Depression Screen- if depression hx and/or present      Statin Therapy:  as noted      Dysphagia Screen: [x] PASS [] FAIL     Smoking [] YES [] NO      Afib [x] YES [] NO     Stroke Education [x] YES [] NO    Obtain screening lower extremity venous ultrasound in patients who meet 1 or more of the following criteria as patient is high risk for DVT/PE on admission:   [] History of DVT/PE  []Hypercoagulable states (Factor V Leiden, Cancer, OCP, etc. )  []Prolonged immobility (hemiplegia/hemiparesis/post operative or any other extended immobilization)  [] Transferred from outside facility (Rehab or Long term care)  [] Age </= to 50

## 2023-07-03 NOTE — OCCUPATIONAL THERAPY INITIAL EVALUATION ADULT - NS ASR FOLLOW COMMAND OT EVAL
100% of the time/able to follow multistep instructions Stitches Removal    WHAT YOU NEED TO KNOW:    Stitches are usually removed within 14 days, depending on the location of the wound. Your healthcare provider will tell you when to return to have your stitches removed. Your provider will use sterile forceps or tweezers to  the knot of each stitch. He or she will cut the stitch with scissors and pull the stitch out. You may feel a slight tug as the stitch comes out.    DISCHARGE INSTRUCTIONS:    Return to the emergency department if:     Your wound splits open or is starting to come apart.      You suddenly cannot move your injured joint.      You have sudden numbness around your wound.      You see red streaks coming from your wound.    Contact your healthcare provider if:     You have a fever and chills.      Your wound is red, warm, swollen, or leaking pus.      There is a bad smell coming from your wound.      You have increased pain in the wound area.      You have questions or concerns about your condition or care.    Care for the area after the stitches are removed:     Do not pull medical tape off. Your provider may place small strips of medical tape across your wound after the stitches have been removed. These strips will peel and fall of on their own. Do not pull them off.      Clean the area as directed. Carefully wash the area with soap and water. Pat the area dry with a clean towel. Check the area for signs of infection, such as redness, swelling, or pus. Also check that the wound is not coming apart.      Protect your wound. Your wound can swell, bleed, or split open if it is stretched or bumped. You may need to wear a bandage that supports your wound until it is completely healed.      Care for a scar. You may have a scar after the stitches are removed. Use sunblock if the area is exposed to the sun. Apply it every day after the stitches are removed. This will help prevent skin discoloration. Talk to your healthcare provider about medicines you can use to make the scar less visible. Some medicines are available without a prescription.    Follow up with your healthcare provider as directed: You may need to return in 3 to 5 days if the stitches are on your face. Stitches on your scalp need to be removed after 7 to 14 days. Stitches over joints may remain in place up to 14 days. Write down your questions so you remember to ask them during your visits.

## 2023-07-03 NOTE — PHYSICAL THERAPY INITIAL EVALUATION ADULT - SENSORY TESTS
(+) eye tracking bilaterally in all directions; (+) acuity in all fields; (+) finger to thumb coordination BUEs.

## 2023-07-03 NOTE — OCCUPATIONAL THERAPY INITIAL EVALUATION ADULT - GENERAL OBSERVATIONS, REHAB EVAL
Received pt OOB in bedside chair, NAD, +IV, +Tele//BP on RA A&Ox4. Pre/post pain 0/10. Pt agreeable to OT evaluation.

## 2023-07-03 NOTE — OCCUPATIONAL THERAPY INITIAL EVALUATION ADULT - LIVES WITH, PROFILE
Pt lives alone in a private home in a [place] community. No steps to navigate. Pt has supportive family/friends to assist as needed./alone

## 2023-07-03 NOTE — PHYSICAL THERAPY INITIAL EVALUATION ADULT - GENERAL OBSERVATIONS, REHAB EVAL
Pt received seated in bedside chair + telemetry//BP. Pt c/o 0/10 pre/post PT pain, pt agreeable to PT

## 2023-07-03 NOTE — PROGRESS NOTE ADULT - ASSESSMENT
Assessment:  62F with PMH HTN who presented with L sided weakness and dizziness, found to have R P2 occlusion, received TNK and was transferred to [facility] for mechanical thrombectomy on 7/1, TICI 3 revascularization.   - POD2  - Exam intact  - Afib rate controlled      Plan:  - Discussed and examined with Dr. Zhou  - Q4 hour neuro checks  - SBP goal normotensive  - 24 hour CTH stable  - MRI pending  - Will need AC, pending MRI, based size of infarct will likely be able to start after MRI performed  - Stroke neurology & cardiology following-  - Further care per primary team

## 2023-07-03 NOTE — DIETITIAN INITIAL EVALUATION ADULT - OTHER INFO
This is a 62y Female  last seen well at 1200 noon 7/2/23 . Developed Left sided numbness weakness. CT CTA CTP revealed L P!- P2 occlusion with no core and 40 ml penumbra. Patient was given tenecteplase at [facility] and transferred to [facility] for  thrombectomy and further care

## 2023-07-03 NOTE — PROGRESS NOTE ADULT - SUBJECTIVE AND OBJECTIVE BOX
cc: cva     h&p/ micu course : This is a 62y Female  with no significant pmhx,  last seen well at 1200 noon. Developed Left sided numbness weaknee. CT CTA CTP revealed L P!- P2 occlusion with no core and 40 ml penumbra.  Patient was given tenecteplase at [facility] and is being transferred to [facility] for possible thrombectomy.  NIH 2 ICH 0 mod ranikin 1   (01 Jul 2023 18:05)    OVERNIGHT EVENTS: Patient doing well, no acute complaints. Remains in Afib, rate controlled overnight, off pressors. Repeat CTH revealed no ICH. Pend MRI brain and echo.       Vital Signs Last 24 Hrs  T(C): 36.8 (03 Jul 2023 11:54), Max: 36.8 (02 Jul 2023 15:30)  T(F): 98.3 (03 Jul 2023 11:54), Max: 98.3 (03 Jul 2023 11:54)  HR: 116 (03 Jul 2023 12:00) (86 - 136)  BP: 100/79 (03 Jul 2023 12:00) (93/61 - 165/146)  BP(mean): 87 (03 Jul 2023 12:00) (71 - 154)  RR: 29 (03 Jul 2023 12:00) (13 - 29)  SpO2: 98% (03 Jul 2023 12:00) (92% - 100%)    Parameters below as of 03 Jul 2023 08:00  Patient On (Oxygen Delivery Method): room air      PHYSICAL EXAM:  General: NAD, pt is comfortably sitting up in bed, on room air  HEENT: EOMI b/l, face symmetric, tongue midline, neck FROM  Cardiovascular: Regular rate and rhythm  Respiratory: nonlabored breathing, normal chest rise  GI: abdomen soft, nontender, nondistended  Neuro: CN II-XII grossly intact, PERRL 3mm briskly reactive   A&Ox3, No aphasia, speech clear, no dysmetria, no pronator drift. Follows commands.  HEATH x4 spontaneously, 5/5 strength in all extremities throughout. sensation intact  Extremities: distal pulses 2+ x4  Wound/incision: R groin c/d/i      RADIOLOGY & ADDITIONAL TESTS:  < from: CT Head No Cont (07.02.23 @ 14:07) >  IMPRESSION:  New focus of low density in the right superior cerebellar hemisphere suggesting the presence of acute infarction (2-15). No david hemorrhagic transformation.      < from: US Duplex Venous Lower Ext Complete, Bilateral (07.02.23 @ 12:22) >  IMPRESSION:  No evidence of deep venous thrombosis in either lower extremity.       cc: cva     h&p/ micu course : 62y Female  with no significant pmhx,  works as aid at [employer],  Developed Left sided facial and upper ext numbness some weakness . CT/ CTA CTP revealed L P!- P2 occlusion with no core and 40 ml penumbra.Patient was given tenecteplase at [facility] and was transferred to [facility] for thrombectomy. NIH 2. S/p cerebral angiogram mechanical thrombectomy TICI 3. Hospital course complicated by hypotension requiring pressors, rapid Afib, s/p Digoxin load x1 dose, remains on PO meds.  Repeat CTH revealed no ICH. Pend MRI brain and echo. transferred to medicine 7/3/23     interval hx: patient seen and eval. awake, alert x 4 , comfortable. in no acute distress. still has mild numbness on left face and left hand fingertips. denies any cp / HA.      Vital Signs Last 24 Hrs  T(C): 36.8 (03 Jul 2023 11:54), Max: 36.8 (02 Jul 2023 15:30)  T(F): 98.3 (03 Jul 2023 11:54), Max: 98.3 (03 Jul 2023 11:54)  HR: 116 (03 Jul 2023 12:00) (86 - 136)  BP: 100/79 (03 Jul 2023 12:00) (93/61 - 165/146)  BP(mean): 87 (03 Jul 2023 12:00) (71 - 154)  RR: 29 (03 Jul 2023 12:00) (13 - 29)  SpO2: 98% (03 Jul 2023 12:00) (92% - 100%)    Parameters below as of 03 Jul 2023 08:00  Patient On (Oxygen Delivery Method): room air      PHYSICAL EXAM:  General: NAD, pt is comfortably sitting up in bed, on room air  HEENT: EOMI b/l, face symmetric, no facial droop noted   Cardiovascular: ir Regular rate and rhythm, no m/r   Respiratory: nonlabored breathing, normal chest rise  GI: abdomen soft, nontender, nondistended  Neuro: CN II-XII grossly intact, PERRL 3mm briskly reactive,  mild paresthesia on left lower face and left hand dorsum , refluxes intact, motor intact  A&Ox3, No aphasia, speech clear, no dysmetria, no pronator drift. Follows commands.  ext : no edema b/l       RADIOLOGY & ADDITIONAL TESTS:  < from: CT Head No Cont (07.02.23 @ 14:07) >  IMPRESSION:  New focus of low density in the right superior cerebellar hemisphere suggesting the presence of acute infarction (2-15). No david hemorrhagic transformation.      < from: US Duplex Venous Lower Ext Complete, Bilateral (07.02.23 @ 12:22) >  IMPRESSION:  No evidence of deep venous thrombosis in either lower extremity.    MEDICATIONS  (STANDING):  aspirin enteric coated 81 milliGRAM(s) Oral daily  atorvastatin 40 milliGRAM(s) Oral at bedtime  chlorhexidine 2% Cloths 1 Application(s) Topical daily  diltiazem    Tablet 60 milliGRAM(s) Oral every 6 hours  enoxaparin Injectable 40 milliGRAM(s) SubCutaneous <User Schedule>  metoprolol tartrate 25 milliGRAM(s) Oral every 6 hours  mupirocin 2% Nasal 1 Application(s) Both Nostrils every 12 hours  polyethylene glycol 3350 17 Gram(s) Oral daily  senna 2 Tablet(s) Oral at bedtime    MEDICATIONS  (PRN):  acetaminophen     Tablet .. 650 milliGRAM(s) Oral every 6 hours PRN Mild Pain (1 - 3), Moderate Pain (4 - 6)  hydrALAZINE Injectable 10 milliGRAM(s) IV Push every 4 hours PRN SBP>180  ondansetron Injectable 4 milliGRAM(s) IV Push every 6 hours PRN Nausea and/or Vomiting

## 2023-07-03 NOTE — PHARMACOTHERAPY INTERVENTION NOTE - COMMENTS
Completed using medical record.  Please refer to outpatient medication review for the updated list. 
LFTs

## 2023-07-03 NOTE — PHYSICAL THERAPY INITIAL EVALUATION ADULT - PERTINENT HX OF CURRENT PROBLEM, REHAB EVAL
63 y/o female with hx of HTN, remote hyperthyroid, family hx of CVA (mother, brother) who presented to [facility] with left-sided numbness, weakness and slurred speech on the evening of 7/1/23.  CT head without acute infarction or hemorrhage, CT Angio revealed right PCA near-complete occlusion at P1/P2 junction. Pt was given tenecteplase and she was then transferred to [facility] where she underwent mechanical thrombectomy with TICI 3 recanalization. Pt was found to have Afib with RVR on telemetry. Repeat CT head with hypodensity in right superior cerebellar region concerning for acute infarction

## 2023-07-03 NOTE — PROGRESS NOTE ADULT - SUBJECTIVE AND OBJECTIVE BOX
Patient is a 62y old  Female who presents with a chief complaint of stroke with thrombus (03 Jul 2023 12:43)    HPI:  This is a 62y Female  last seen well at 1200 noon. Developed Left sided numbness weaknee. CT CTA CTP revealed L P!- P2 occlusion with no core and 40 ml penumbra.  Patient was given tenecteplase at [facility] and is being transferred to [facility] for possible thrombectomy.  NIH 2 ICH 0 mod ranikin 1   (01 Jul 2023 18:05)      Interval history:  Patient seen and examined by neuro IR team. Patient POD2 from mechanical thrombectomy for RP2 occlusion. Patient's exam now back to baseline. Newly diagnosed afib likely source of embolism, stabilized on PO metoprolol and cardizem. No other acute events reported.       Vital Signs Last 24 Hrs  T(C): 36.8 (03 Jul 2023 11:54), Max: 36.8 (03 Jul 2023 11:54)  T(F): 98.3 (03 Jul 2023 11:54), Max: 98.3 (03 Jul 2023 11:54)  HR: 105 (03 Jul 2023 14:00) (86 - 134)  BP: 93/67 (03 Jul 2023 14:00) (93/61 - 130/92)  BP(mean): 76 (03 Jul 2023 14:00) (71 - 118)  RR: 22 (03 Jul 2023 14:00) (17 - 24)  SpO2: 98% (03 Jul 2023 14:00) (92% - 100%)    Parameters below as of 03 Jul 2023 12:00  Patient On (Oxygen Delivery Method): room air      Physical Exam:  Constitutional: NAD, lying in bed  Neuro  * Mental Status:  GCS 15: Awake, alert, oriented to conversation. No aphasia or difficulty speaking. No dysarthria.   * Cranial Nerves: Cnii-Cnxii grossly intact. PERRL, EOMI, tongue midline, no gaze deviation  * Motor: RUE 5/5, LUE 5/5, RLE 5/5, LLE 5/5, no drift   * Sensory: Sensation intact to light touch  * Reflexes: not assessed   Groin: soft, nontender, no hematoma, tiny ecchymoses       LABS:                        12.0   11.33 )-----------( 193      ( 03 Jul 2023 03:58 )             35.8     07-03    135  |  102  |  27.4<H>  ----------------------------<  99  4.1   |  20.0<L>  |  0.86    Ca    8.3<L>      03 Jul 2023 03:58  Phos  2.6     07-03  Mg     2.1     07-03    Urinalysis Basic - ( 03 Jul 2023 03:58 )  Color: x / Appearance: x / SG: x / pH: x  Gluc: 99 mg/dL / Ketone: x  / Bili: x / Urobili: x   Blood: x / Protein: x / Nitrite: x   Leuk Esterase: x / RBC: x / WBC x   Sq Epi: x / Non Sq Epi: x / Bacteria: x      Medications:  MEDICATIONS  (STANDING):  aspirin enteric coated 81 milliGRAM(s) Oral daily  atorvastatin 40 milliGRAM(s) Oral at bedtime  chlorhexidine 2% Cloths 1 Application(s) Topical daily  diltiazem    Tablet 60 milliGRAM(s) Oral every 6 hours  enoxaparin Injectable 40 milliGRAM(s) SubCutaneous <User Schedule>  metoprolol tartrate 25 milliGRAM(s) Oral every 6 hours  mupirocin 2% Nasal 1 Application(s) Both Nostrils every 12 hours  polyethylene glycol 3350 17 Gram(s) Oral daily  senna 2 Tablet(s) Oral at bedtime    MEDICATIONS  (PRN):  acetaminophen     Tablet .. 650 milliGRAM(s) Oral every 6 hours PRN Mild Pain (1 - 3), Moderate Pain (4 - 6)  hydrALAZINE Injectable 10 milliGRAM(s) IV Push every 4 hours PRN SBP>180  ondansetron Injectable 4 milliGRAM(s) IV Push every 6 hours PRN Nausea and/or Vomiting      RADIOLOGY & ADDITIONAL STUDIES:  < from: CT Head No Cont (07.02.23 @ 14:07) >  IMPRESSION:    New focus of low density in the right superior cerebellar hemisphere   suggesting the presence of acute infarction (2-15). No david hemorrhagic   transformation.    Dr. Penny discussed these findings with physician's assistant Bety on   7/2/2023 2:42 PM with read back.    --- End of Report ---    DORA PENNY MD; Attending Radiologist  This document has been electronically signed. Jul 2 2023  2:42PM    < end of copied text >

## 2023-07-03 NOTE — PROGRESS NOTE ADULT - SUBJECTIVE AND OBJECTIVE BOX
Preliminary note, offical recommendations pending attending review/signature   [facility] Stroke Team  Progress Note     HPI:  This is a 62y Female  last seen well at 1200 noon 7/2/23 . Developed Left sided numbness weakness. CT CTA CTP revealed L P!- P2 occlusion with no core and 40 ml penumbra.Patient was given tenecteplase at [facility] and transferred to [facility] for  thrombectomy and further care.  NIH 2 ICH 0 MRS 1     SUBJECTIVE: No events overnight.  No new neurologic complaints.  ROS reported negative unless otherwise noted.    acetaminophen     Tablet .. 650 milliGRAM(s) Oral every 6 hours PRN  aspirin enteric coated 81 milliGRAM(s) Oral daily  atorvastatin 40 milliGRAM(s) Oral at bedtime  chlorhexidine 2% Cloths 1 Application(s) Topical daily  diltiazem    Tablet 60 milliGRAM(s) Oral every 6 hours  enoxaparin Injectable 40 milliGRAM(s) SubCutaneous <User Schedule>  hydrALAZINE Injectable 10 milliGRAM(s) IV Push every 2 hours PRN  metoprolol tartrate 25 milliGRAM(s) Oral every 6 hours  mupirocin 2% Nasal 1 Application(s) Both Nostrils every 12 hours  ondansetron Injectable 4 milliGRAM(s) IV Push every 6 hours PRN  polyethylene glycol 3350 17 Gram(s) Oral daily  senna 2 Tablet(s) Oral at bedtime      PHYSICAL EXAM:   Vital Signs Last 24 Hrs  T(C): 36.5 (03 Jul 2023 00:07), Max: 36.8 (02 Jul 2023 15:30)  T(F): 97.7 (03 Jul 2023 00:07), Max: 98.2 (02 Jul 2023 15:30)  HR: 102 (03 Jul 2023 07:00) (86 - 154)  BP: 106/86 (03 Jul 2023 07:00) (93/61 - 190/158)  BP(mean): 94 (03 Jul 2023 07:00) (71 - 169)  RR: 23 (03 Jul 2023 07:00) (13 - 26)  SpO2: 97% (03 Jul 2023 07:00) (88% - 100%)    Parameters below as of 03 Jul 2023 04:00  Patient On (Oxygen Delivery Method): room air      EXAM PENDING   General: No acute distress    NEUROLOGICAL EXAM:  Mental status: Awake, alert, oriented x3, speech fluent, follows commands, no neglect, normal memory   Cranial Nerves: No facial asymmetry, no nystagmus, no dysarthria,  tongue midline  Motor exam: Normal tone, no drift, 5/5 RUE, 5/5 RLE, 5/5 LUE, 5/5 LLE, normal fine finger movements.  Sensation: Intact to light touch   Coordination/ Gait: No dysmetria, gait not tested    LABS:                        12.0   11.33 )-----------( 193      ( 03 Jul 2023 03:58 )             35.8    07-03    135  |  102  |  27.4<H>  ----------------------------<  99  4.1   |  20.0<L>  |  0.86    Ca    8.3<L>      03 Jul 2023 03:58  Phos  2.6     07-03  Mg     2.1     07-03          IMAGING: Reviewed by me.     CT Head No Cont (07.02.23 @ 14:07)   New focus of low density in the right superior cerebellar hemisphere   suggesting the presence of acute infarction (2-15). No david hemorrhagic   transformation.    CT head 7/1/23 12:49  IMPRESSION:  No acute intracranial bleeding.    7/2/23 13: 01  CT PERFUSION: Moderate sized right PCA ischemic penumbra involving the right  occipital lobe and posterior right mesial temporal lobe measuring  approximately 40 cc. No significant core infarction.  CTA BRAIN: *Near-complete occlusion of the right PCA at the P1/P2 junction,  corresponding to ischemic penumbra on concurrent perfusion..  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 No  associated calcification or thrombus.  CTA NECK: Patent cervical vasculature. No flow limiting stenosis or  occlusion.    Preliminary note, offical recommendations pending attending review/signature   [facility] Stroke Team  Progress Note     HPI:  This is a 62y Female HTN  last seen well at 1200 noon 7/2/23 . Developed Left sided numbness weakness. CT CTA CTP revealed L P!- P2 occlusion with no core and 40 ml penumbra.Patient was given tenecteplase at [facility] and transferred to [facility] for  thrombectomy and further care.  NIH 2 ICH 0 MRS 1     SUBJECTIVE: No events overnight.  No new neurologic complaints.  ROS reported negative unless otherwise noted. Feels anxious. Some warmness in left face and tingling on tips of pointer/middle finger.    acetaminophen     Tablet .. 650 milliGRAM(s) Oral every 6 hours PRN  aspirin enteric coated 81 milliGRAM(s) Oral daily  atorvastatin 40 milliGRAM(s) Oral at bedtime  chlorhexidine 2% Cloths 1 Application(s) Topical daily  diltiazem    Tablet 60 milliGRAM(s) Oral every 6 hours  enoxaparin Injectable 40 milliGRAM(s) SubCutaneous <User Schedule>  hydrALAZINE Injectable 10 milliGRAM(s) IV Push every 2 hours PRN  metoprolol tartrate 25 milliGRAM(s) Oral every 6 hours  mupirocin 2% Nasal 1 Application(s) Both Nostrils every 12 hours  ondansetron Injectable 4 milliGRAM(s) IV Push every 6 hours PRN  polyethylene glycol 3350 17 Gram(s) Oral daily  senna 2 Tablet(s) Oral at bedtime      PHYSICAL EXAM:   Vital Signs Last 24 Hrs  T(C): 36.5 (03 Jul 2023 00:07), Max: 36.8 (02 Jul 2023 15:30)  T(F): 97.7 (03 Jul 2023 00:07), Max: 98.2 (02 Jul 2023 15:30)  HR: 102 (03 Jul 2023 07:00) (86 - 154)  BP: 106/86 (03 Jul 2023 07:00) (93/61 - 190/158)  BP(mean): 94 (03 Jul 2023 07:00) (71 - 169)  RR: 23 (03 Jul 2023 07:00) (13 - 26)  SpO2: 97% (03 Jul 2023 07:00) (88% - 100%)    Parameters below as of 03 Jul 2023 04:00  Patient On (Oxygen Delivery Method): room air      Tearful   General: No acute distress    NEUROLOGICAL EXAM:  Mental status: Awake, alert, oriented x3, speech fluent, follows commands, no neglect, normal memory   Cranial Nerves: No facial asymmetry, no nystagmus, no dysarthria,  tongue midline  Motor exam: Normal tone, no drift, 5/5 RUE, 5/5 RLE, 5/5 LUE, 5/5 LLE, normal fine finger movements.  Sensation: Intact to light touch   Coordination/ Gait: No dysmetria, gait not tested    LABS:                        12.0   11.33 )-----------( 193      ( 03 Jul 2023 03:58 )             35.8    07-03    135  |  102  |  27.4<H>  ----------------------------<  99  4.1   |  20.0<L>  |  0.86    Ca    8.3<L>      03 Jul 2023 03:58  Phos  2.6     07-03  Mg     2.1     07-03          IMAGING: Reviewed by me.     CT Head No Cont (07.02.23 @ 14:07)   New focus of low density in the right superior cerebellar hemisphere   suggesting the presence of acute infarction (2-15). No david hemorrhagic   transformation.    CT head 7/1/23 12:49  IMPRESSION:  No acute intracranial bleeding.    7/2/23 13: 01  CT PERFUSION: Moderate sized right PCA ischemic penumbra involving the right  occipital lobe and posterior right mesial temporal lobe measuring  approximately 40 cc. No significant core infarction.  CTA BRAIN: *Near-complete occlusion of the right PCA at the P1/P2 junction,  corresponding to ischemic penumbra on concurrent perfusion..  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 No  associated calcification or thrombus.  CTA NECK: Patent cervical vasculature. No flow limiting stenosis or  occlusion.    [facility] Stroke Team  Progress Note     HPI:  This is a 62y Female HTN  last seen well at 1200 noon 7/2/23 . Developed Left sided numbness weakness. CT CTA CTP revealed L P!- P2 occlusion with no core and 40 ml penumbra. Patient was given tenecteplase at [facility] and transferred to [facility] for  thrombectomy and further care.  NIH 2 ICH 0 MRS 1     SUBJECTIVE: No events overnight.  No new neurologic complaints.  ROS reported negative unless otherwise noted. Feels anxious. Some warmness in left face and tingling on tips of pointer/middle finger.    acetaminophen     Tablet .. 650 milliGRAM(s) Oral every 6 hours PRN  aspirin enteric coated 81 milliGRAM(s) Oral daily  atorvastatin 40 milliGRAM(s) Oral at bedtime  chlorhexidine 2% Cloths 1 Application(s) Topical daily  diltiazem    Tablet 60 milliGRAM(s) Oral every 6 hours  enoxaparin Injectable 40 milliGRAM(s) SubCutaneous <User Schedule>  hydrALAZINE Injectable 10 milliGRAM(s) IV Push every 2 hours PRN  metoprolol tartrate 25 milliGRAM(s) Oral every 6 hours  mupirocin 2% Nasal 1 Application(s) Both Nostrils every 12 hours  ondansetron Injectable 4 milliGRAM(s) IV Push every 6 hours PRN  polyethylene glycol 3350 17 Gram(s) Oral daily  senna 2 Tablet(s) Oral at bedtime      PHYSICAL EXAM:   Vital Signs Last 24 Hrs  T(C): 36.5 (03 Jul 2023 00:07), Max: 36.8 (02 Jul 2023 15:30)  T(F): 97.7 (03 Jul 2023 00:07), Max: 98.2 (02 Jul 2023 15:30)  HR: 102 (03 Jul 2023 07:00) (86 - 154)  BP: 106/86 (03 Jul 2023 07:00) (93/61 - 190/158)  BP(mean): 94 (03 Jul 2023 07:00) (71 - 169)  RR: 23 (03 Jul 2023 07:00) (13 - 26)  SpO2: 97% (03 Jul 2023 07:00) (88% - 100%)    Parameters below as of 03 Jul 2023 04:00  Patient On (Oxygen Delivery Method): room air      Tearful   General: No acute distress    NEUROLOGICAL EXAM:  Mental status: Awake, alert, oriented x3, speech fluent, follows commands, no neglect, normal memory   Cranial Nerves: No facial asymmetry, no nystagmus, no dysarthria,  tongue midline  Motor exam: Normal tone, no drift, 5/5 RUE, 5/5 RLE, 5/5 LUE, 5/5 LLE, normal fine finger movements.  Sensation: Intact to light touch   Coordination/ Gait: No dysmetria, gait not tested    LABS:                        12.0   11.33 )-----------( 193      ( 03 Jul 2023 03:58 )             35.8    07-03    135  |  102  |  27.4<H>  ----------------------------<  99  4.1   |  20.0<L>  |  0.86    Ca    8.3<L>      03 Jul 2023 03:58  Phos  2.6     07-03  Mg     2.1     07-03          IMAGING: Reviewed by me.     CT Head No Cont (07.02.23 @ 14:07)   New focus of low density in the right superior cerebellar hemisphere   suggesting the presence of acute infarction (2-15). No david hemorrhagic   transformation.    CT head 7/1/23 12:49  IMPRESSION:  No acute intracranial bleeding.    7/2/23 13: 01  CT PERFUSION: Moderate sized right PCA ischemic penumbra involving the right  occipital lobe and posterior right mesial temporal lobe measuring  approximately 40 cc. No significant core infarction.  CTA BRAIN: *Near-complete occlusion of the right PCA at the P1/P2 junction,  corresponding to ischemic penumbra on concurrent perfusion..  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 No  associated calcification or thrombus.  CTA NECK: Patent cervical vasculature. No flow limiting stenosis or  occlusion.

## 2023-07-03 NOTE — PHYSICAL THERAPY INITIAL EVALUATION ADULT - ADDITIONAL COMMENTS
Pt lives alone in a private home in a [place] community. No steps to navigate. Pt was independent PTA without an assist device. Pt owns no DME however, has a built in shower seat and grab bars in her bathroom.

## 2023-07-03 NOTE — DIETITIAN INITIAL EVALUATION ADULT - ORAL INTAKE PTA/DIET HISTORY
Pt reports eating well PTA; denies any recent weight changes. Pt currently denies difficulty chewing or swallowing. Pt reports having a good appetite at this time.

## 2023-07-03 NOTE — PHYSICAL THERAPY INITIAL EVALUATION ADULT - LIVES WITH, PROFILE
alone profuse verbal production less irritable no frankly bizarre delusional content but is preoccupied with thoughts about family

## 2023-07-03 NOTE — PROGRESS NOTE ADULT - PROBLEM SELECTOR PLAN 1
Continue Lopressor and Cardizem  TTE  Outpatient ischemic evaluation  CHADSVASC 4, start DOAC when cleared by neurology Continue Lopressor and Cardizem  Rate elevated at 111 at times.  Increase lopressor to 50mg po q8  TTE  Outpatient ischemic evaluation  CHADSVASC 4, start DOAC when cleared by neurology

## 2023-07-03 NOTE — PROGRESS NOTE ADULT - SUBJECTIVE AND OBJECTIVE BOX
[facility]                                                         CARDIOLOGY AT [facility]                                                                  [address], [place] [address]                                                         Telephone: [phone]. Fax:[phone]                                                                             PROGRESS NOTE    Reason for follow up:  Stroke afib  Update:   Start AC pending neuro approval    Review of symptoms:   Cardiac:  No chest pain. No dyspnea. No palpitations.  Respiratory: no cough. No dyspnea  Gastrointestinal: No diarrhea. No abdominal pain. No bleeding.   Neuro: No focal neuro complaints.    Vitals:  T(C): 36.5 (07-03-23 @ 00:07), Max: 36.8 (07-02-23 @ 15:30)  HR: 102 (07-03-23 @ 07:00) (86 - 154)  BP: 106/86 (07-03-23 @ 07:00) (93/61 - 190/158)  RR: 23 (07-03-23 @ 07:00) (13 - 26)  SpO2: 97% (07-03-23 @ 07:00) (88% - 100%)    I&O's Summary  02 Jul 2023 07:01  -  03 Jul 2023 07:00  --------------------------------------------------------  IN: 2276.5 mL / OUT: 650 mL / NET: 1626.5 mL    Weight (kg): 100.8 (07-01 @ 17:52)    PHYSICAL EXAM:  Appearance: Comfortable. No acute distress  HEENT:  Atraumatic. Normocephalic.  Normal oral mucosa  Neurologic: A & O x 3, no gross focal deficits.  Cardiovascular: RRR S1 S2, No murmur, no rubs/gallops. No JVD  Respiratory: Lungs clear to auscultation, unlabored   Gastrointestinal:  Soft, Non-tender, + BS  Lower Extremities: 2+ Peripheral Pulses, No clubbing, cyanosis, or edema  Psychiatry: Patient is calm. No agitation.   Skin: warm and dry.    CURRENT CARDIAC MEDICATIONS:  diltiazem    Tablet 60 milliGRAM(s) Oral every 6 hours  hydrALAZINE Injectable 10 milliGRAM(s) IV Push every 2 hours PRN  metoprolol tartrate 25 milliGRAM(s) Oral every 6 hours    CURRENT OTHER MEDICATIONS:  acetaminophen     Tablet .. 650 milliGRAM(s) Oral every 6 hours PRN Mild Pain (1 - 3), Moderate Pain (4 - 6)  ondansetron Injectable 4 milliGRAM(s) IV Push every 6 hours PRN Nausea and/or Vomiting  polyethylene glycol 3350 17 Gram(s) Oral daily  senna 2 Tablet(s) Oral at bedtime  atorvastatin 40 milliGRAM(s) Oral at bedtime  aspirin enteric coated 81 milliGRAM(s) Oral daily  chlorhexidine 2% Cloths 1 Application(s) Topical daily  enoxaparin Injectable 40 milliGRAM(s) SubCutaneous <User Schedule>  mupirocin 2% Nasal 1 Application(s) Both Nostrils every 12 hours, Stop order after: 5 Days      LABS:	 	  ( 01 Jul 2023 18:50 )  Troponin T  <0.01,  CPK  X    , CKMB  X    , BNP X                                  12.0   11.33 )-----------( 193      ( 03 Jul 2023 03:58 )             35.8     07-03    135  |  102  |  27.4<H>  ----------------------------<  99  4.1   |  20.0<L>  |  0.86    Ca    8.3<L>      03 Jul 2023 03:58  Phos  2.6     07-03  Mg     2.1     07-03        Lipid Profile: Date: 07-02 @ 03:50  Total cholesterol 146; Direct LDL: --; HDL: 43; Triglycerides:58    HgA1c:   TSH: Thyroid Stimulating Hormone, Serum: 0.58 uIU/mL      TELEMETRY:   ECG:    DIAGNOSTIC TESTING:  [ ] Echocardiogram:   [ ]  Catheterization:  [ ] Stress Test:    OTHER: 	                                                                [facility]                                                         CARDIOLOGY AT [facility]                                                                  [address], [place] [address]                                                         Telephone: [phone]. Fax:[phone]                                                                             PROGRESS NOTE    Reason for follow up:  Stroke afib  Update:   Start AC pending neuro approval  Review of symptoms:   Cardiac:  No chest pain. No dyspnea. No palpitations.  Respiratory: no cough. No dyspnea  Gastrointestinal: No diarrhea. No abdominal pain. No bleeding.   Neuro: No focal neuro complaints.    Vitals:  T(C): 36.5 (07-03-23 @ 00:07), Max: 36.8 (07-02-23 @ 15:30)  HR: 102 (07-03-23 @ 07:00) (86 - 154)  BP: 106/86 (07-03-23 @ 07:00) (93/61 - 190/158)  RR: 23 (07-03-23 @ 07:00) (13 - 26)  SpO2: 97% (07-03-23 @ 07:00) (88% - 100%)    I&O's Summary  02 Jul 2023 07:01  -  03 Jul 2023 07:00  --------------------------------------------------------  IN: 2276.5 mL / OUT: 650 mL / NET: 1626.5 mL    Weight (kg): 100.8 (07-01 @ 17:52)    PHYSICAL EXAM:  Appearance: Comfortable. No acute distress  HEENT:  Atraumatic. Normocephalic.  Normal oral mucosa  Neurologic: A & O x 3, no gross focal deficits.  Cardiovascular: RRR S1 S2, No murmur, no rubs/gallops. No JVD  Respiratory: Lungs clear to auscultation, unlabored   Gastrointestinal:  Soft, Non-tender, + BS  Lower Extremities: 2+ Peripheral Pulses, No clubbing, cyanosis, or edema  Psychiatry: Patient is calm. No agitation.   Skin: warm and dry.    CURRENT CARDIAC MEDICATIONS:  diltiazem    Tablet 60 milliGRAM(s) Oral every 6 hours  hydrALAZINE Injectable 10 milliGRAM(s) IV Push every 2 hours PRN  metoprolol tartrate 25 milliGRAM(s) Oral every 6 hours    CURRENT OTHER MEDICATIONS:  acetaminophen     Tablet .. 650 milliGRAM(s) Oral every 6 hours PRN Mild Pain (1 - 3), Moderate Pain (4 - 6)  ondansetron Injectable 4 milliGRAM(s) IV Push every 6 hours PRN Nausea and/or Vomiting  polyethylene glycol 3350 17 Gram(s) Oral daily  senna 2 Tablet(s) Oral at bedtime  atorvastatin 40 milliGRAM(s) Oral at bedtime  aspirin enteric coated 81 milliGRAM(s) Oral daily  chlorhexidine 2% Cloths 1 Application(s) Topical daily  enoxaparin Injectable 40 milliGRAM(s) SubCutaneous <User Schedule>  mupirocin 2% Nasal 1 Application(s) Both Nostrils every 12 hours, Stop order after: 5 Days      LABS:	 	  ( 01 Jul 2023 18:50 )  Troponin T  <0.01,  CPK  X    , CKMB  X    , BNP X                                  12.0   11.33 )-----------( 193      ( 03 Jul 2023 03:58 )             35.8     07-03    135  |  102  |  27.4<H>  ----------------------------<  99  4.1   |  20.0<L>  |  0.86    Ca    8.3<L>      03 Jul 2023 03:58  Phos  2.6     07-03  Mg     2.1     07-03        Lipid Profile: Date: 07-02 @ 03:50  Total cholesterol 146; Direct LDL: --; HDL: 43; Triglycerides:58    HgA1c:   TSH: Thyroid Stimulating Hormone, Serum: 0.58 uIU/mL      TELEMETRY:   ECG:    DIAGNOSTIC TESTING:  [ ] Echocardiogram:   [ ]  Catheterization:  [ ] Stress Test:    OTHER: 	                                                                [facility]                                                         CARDIOLOGY AT [facility]                                                                  [address], [place] [address]                                                         Telephone: [phone]. Fax:[phone]                                                                             PROGRESS NOTE    Reason for follow up:  Stroke afib  Update:   Start AC pending neuro approval  Review of symptoms:   Cardiac:  No chest pain. No dyspnea. No palpitations.  Respiratory: no cough. No dyspnea  Gastrointestinal: No diarrhea. No abdominal pain. No bleeding.   Neuro: No focal neuro complaints.    Vitals:  T(C): 36.5 (07-03-23 @ 00:07), Max: 36.8 (07-02-23 @ 15:30)  HR: 102 (07-03-23 @ 07:00) (86 - 154)  BP: 106/86 (07-03-23 @ 07:00) (93/61 - 190/158)  RR: 23 (07-03-23 @ 07:00) (13 - 26)  SpO2: 97% (07-03-23 @ 07:00) (88% - 100%)    I&O's Summary  02 Jul 2023 07:01  -  03 Jul 2023 07:00  --------------------------------------------------------  IN: 2276.5 mL / OUT: 650 mL / NET: 1626.5 mL    Weight (kg): 100.8 (07-01 @ 17:52)    PHYSICAL EXAM:  Appearance: Comfortable. No acute distress  HEENT:  Atraumatic. Normocephalic.  Normal oral mucosa  Neurologic: A & O x 3, no gross focal deficits.  Cardiovascular: RRR S1 S2, No murmur, no rubs/gallops. No JVD  Respiratory: Lungs clear to auscultation, unlabored   Gastrointestinal:  Soft, Non-tender, + BS  Lower Extremities: 2+ Peripheral Pulses, No clubbing, cyanosis, or edema  Psychiatry: Patient is calm. No agitation.   Skin: warm and dry.    CURRENT CARDIAC MEDICATIONS:  diltiazem    Tablet 60 milliGRAM(s) Oral every 6 hours  hydrALAZINE Injectable 10 milliGRAM(s) IV Push every 2 hours PRN  metoprolol tartrate 25 milliGRAM(s) Oral every 6 hours    CURRENT OTHER MEDICATIONS:  acetaminophen     Tablet .. 650 milliGRAM(s) Oral every 6 hours PRN Mild Pain (1 - 3), Moderate Pain (4 - 6)  ondansetron Injectable 4 milliGRAM(s) IV Push every 6 hours PRN Nausea and/or Vomiting  polyethylene glycol 3350 17 Gram(s) Oral daily  senna 2 Tablet(s) Oral at bedtime  atorvastatin 40 milliGRAM(s) Oral at bedtime  aspirin enteric coated 81 milliGRAM(s) Oral daily  chlorhexidine 2% Cloths 1 Application(s) Topical daily  enoxaparin Injectable 40 milliGRAM(s) SubCutaneous <User Schedule>  mupirocin 2% Nasal 1 Application(s) Both Nostrils every 12 hours, Stop order after: 5 Days      LABS:	 	  ( 01 Jul 2023 18:50 )  Troponin T  <0.01,  CPK  X    , CKMB  X    , BNP X                                  12.0   11.33 )-----------( 193      ( 03 Jul 2023 03:58 )             35.8     07-03    135  |  102  |  27.4<H>  ----------------------------<  99  4.1   |  20.0<L>  |  0.86    Ca    8.3<L>      03 Jul 2023 03:58  Phos  2.6     07-03  Mg     2.1     07-03    Lipid Profile: Date: 07-02 @ 03:50  Total cholesterol 146; Direct LDL: --; HDL: 43; Triglycerides:58      TSH: Thyroid Stimulating Hormone, Serum: 0.58 uIU/mL      TELEMETRY:  Flutter/fib with rates elevated at 111

## 2023-07-03 NOTE — OCCUPATIONAL THERAPY INITIAL EVALUATION ADULT - DIAGNOSIS, OT EVAL
62 year old Female with hx of HTN, remote hyperthyroid, family hx of CVA (mother, brother) who presented to [facility] with left-sided numbness, weakness and slurred speech on the evening of 7/1/23.  CT head without acute infarction or hemorrhage, CT Angio revealed right PCA near-complete occlusion at P1/P2 junction. Pt was given tenecteplase and was then transferred to [facility] where she underwent mechanical thrombectomy with TICI 3 recanalization. Pt was found to have Afib with RVR on telemetry. Repeat CT head with hypodensity in right superior cerebellar region concerning for acute infarction.

## 2023-07-03 NOTE — DIETITIAN INITIAL EVALUATION ADULT - PERTINENT LABORATORY DATA
07-03    135  |  102  |  27.4<H>  ----------------------------<  99  4.1   |  20.0<L>  |  0.86    Ca    8.3<L>      03 Jul 2023 03:58  Phos  2.6     07-03  Mg     2.1     07-03    A1C with Estimated Average Glucose Result: 5.0 % (07-02-23 @ 03:50)

## 2023-07-03 NOTE — PROGRESS NOTE ADULT - ASSESSMENT
63 y/o female with hx of HTN, remote hyperthyroid, family hx of CVA (mother, brother) who presented to [facility] with left-sided numbness, weakness and slurred speech. Head CTA revealed right PCA near-complete occlusion at P1/P2 junction. Pt was given tenecteplase and she was then transferred to [facility] for possible intervention. She is now s/p cerebral angiography and suction thrombectomy.   Pt was found to have Afib with RVR on telemetry. She denies any prior hx of Afib but endorses recent feeling of fluttering in her chest. Pt denies chest pain or SOB. EKG at [facility] shows Afib with ST depressions and TWI in lateral leads. Pt does not have a cardiologist.

## 2023-07-03 NOTE — SPEECH LANGUAGE PATHOLOGY EVALUATION - SLP GENERAL OBSERVATIONS
Pt received & seen seated upright OOB in chair, awake/alert, pleasant & cooperative, 0/10 pain pre/post

## 2023-07-03 NOTE — PROGRESS NOTE ADULT - ASSESSMENT
61 y/o female with PMHx of HTN presented to [facility] ED 7/1 with left sided facial and arm numbness, h/a and nausea. Initial CTH negative, CTA revealed CTP revealed moderate R PCA 40cc ischemic penumbra involving R occipital lobe and posterior right mesial temporal lobe. 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 S/p tenecteplase 7/1 13:40. At [facility], patient in rapid Afib on Cardizem drip, hypotensive started Amiodarone bolus. Transferred to [facility] for further management. S/p cerebral angiogram mechanical thrombectomy TICI 3. Hospital course complicated by hypotension requiring pressors, rapid Afib, s/p Digoxin load x1 dose, remains on PO meds.       PLAN:  - neuro checks q4hrs   - MRI brain pending   - stroke neurology following  - pain control PRN: tylenol      CV:   - 100-180   - Lipitor 40mg, LDL 91    - Metoprolol 25q6   - Cardizem 60q6    - PRN: Hydralazine  - cardiology following, AC when cleared by neurology     Resp:    - RA     GI:   - Regular  - LBM: outpatient   - bowel regimen: Senna, Miralax   - PRN: Zofran       :   - IVL, voiding       Heme:   - DVT ppx: SCDs, SQL 40mg   - ASA 81mg    ID:   - +MSSA bactroban x5days (7/2-7/6)     Endo:    - A1C 5.0  - TSH 0.58, T3 83, T4 7.5     62y Female  with no significant pmhx,  works as aid at [place],  Developed Left sided facial and upper ext numbness some weakness . CT/ CTA CTP revealed L P!- P2 occlusion with no core and 40 ml penumbra.Patient was given tenecteplase at [facility] and was transferred to [facility] for thrombectomy. NIH 2. S/p cerebral angiogram mechanical thrombectomy TICI 3. Hospital course complicated by hypotension requiring pressors, rapid Afib, s/p Digoxin load x1 dose, remains on PO meds.  Repeat CTH revealed no ICH. Pend MRI brain and echo. transferred to medicine 7/3/23       > acute cva   -ct  New focus of low density in the right superior cerebellar hemisphere suggesting the presence of acute infarction (2-15). No david hemorrhagic transformation  - S/p cerebral angiogram mechanical thrombectomy   - etiology likely thromboembolic  - A1C 5.0  - TSH 0.58, T3 83, T4 7.5  - neuro checks q 4   - pending MRI brain and echo  - neuro stroke following   - pt/ot speech eval   - c/w aspirin / statins     > afib/ new diagnosis   - cardio following   - echo   - no plan for MIRANDA at thsi time   - c/w aspirin    - start AC as per cardio recs when cleared by neurology     > leukocytosis   - likely reactive   - no fever   - monitor     > +MSSA in nares   - bactroban x5days (7/2-7/6)     >dvt ppx : sq lovenox

## 2023-07-03 NOTE — CHART NOTE - NSCHARTNOTEFT_GEN_A_CORE
HPI:  This is a 62y Female  last seen well at 1200 noon. Developed Left sided numbness weaknee. CT CTA CTP revealed L P!- P2 occlusion with no core and 40 ml penumbra.  Patient was given tenecteplase at [facility] and is being transferred to [facility] for possible thrombectomy.  NIH 2 ICH 0 mod liatikin 1   (01 Jul 2023 18:05)    OVERNIGHT EVENTS: Patient doing well, no acute complaints. Remains in Afib, rate controlled overnight, off pressors. Repeat CTH revealed no ICH. Pend MRI brain and echo.     Vital Signs Last 24 Hrs  T(C): 36.6 (03 Jul 2023 07:46), Max: 36.8 (02 Jul 2023 15:30)  T(F): 97.9 (03 Jul 2023 07:46), Max: 98.2 (02 Jul 2023 15:30)  HR: 91 (03 Jul 2023 08:00) (86 - 154)  BP: 100/77 (03 Jul 2023 08:00) (93/61 - 190/158)  BP(mean): 85 (03 Jul 2023 08:00) (71 - 169)  RR: 21 (03 Jul 2023 08:00) (13 - 25)  SpO2: 99% (03 Jul 2023 08:00) (92% - 100%)    Parameters below as of 03 Jul 2023 08:00  Patient On (Oxygen Delivery Method): room air    I&O's Summary    02 Jul 2023 07:01  -  03 Jul 2023 07:00  --------------------------------------------------------  IN: 2276.5 mL / OUT: 650 mL / NET: 1626.5 mL    03 Jul 2023 07:01  -  03 Jul 2023 09:27  --------------------------------------------------------  IN: 50 mL / OUT: 0 mL / NET: 50 mL      PHYSICAL EXAM:  General: NAD, pt is comfortably sitting up in bed, on room air  HEENT: EOMI b/l, face symmetric, tongue midline, neck FROM  Cardiovascular: Regular rate and rhythm  Respiratory: nonlabored breathing, normal chest rise  GI: abdomen soft, nontender, nondistended  Neuro: CN II-XII grossly intact, PERRL 3mm briskly reactive   A&Ox3, No aphasia, speech clear, no dysmetria, no pronator drift. Follows commands.  HAETH x4 spontaneously, 5/5 strength in all extremities throughout. sensation intact  Extremities: distal pulses 2+ x4  Wound/incision: R groin c/d/i    TUBES/LINES:  [] Emery  [] Wound Drains  [] Others      DIET:  [] NPO  [x] Mechanical  [] Tube feeds    LABS:                        12.0   11.33 )-----------( 193      ( 03 Jul 2023 03:58 )             35.8     07-03    135  |  102  |  27.4<H>  ----------------------------<  99  4.1   |  20.0<L>  |  0.86    Ca    8.3<L>      03 Jul 2023 03:58  Phos  2.6     07-03  Mg     2.1     07-03      Urinalysis Basic - ( 03 Jul 2023 03:58 )  Color: x / Appearance: x / SG: x / pH: x  Gluc: 99 mg/dL / Ketone: x  / Bili: x / Urobili: x   Blood: x / Protein: x / Nitrite: x   Leuk Esterase: x / RBC: x / WBC x   Sq Epi: x / Non Sq Epi: x / Bacteria: x    CARDIAC MARKERS ( 01 Jul 2023 18:50 )  x     / <0.01 ng/mL / x     / x     / x        Allergies  azithromycin (Hives; Rash)  penicillin (Hives; Rash)    MEDICATIONS:  acetaminophen     Tablet .. 650 milliGRAM(s) Oral every 6 hours PRN  ondansetron Injectable 4 milliGRAM(s) IV Push every 6 hours PRN  aspirin enteric coated 81 milliGRAM(s) Oral daily  enoxaparin Injectable 40 milliGRAM(s) SubCutaneous <User Schedule>  atorvastatin 40 milliGRAM(s) Oral at bedtime  chlorhexidine 2% Cloths 1 Application(s) Topical daily  diltiazem    Tablet 60 milliGRAM(s) Oral every 6 hours  hydrALAZINE Injectable 10 milliGRAM(s) IV Push every 2 hours PRN  metoprolol tartrate 25 milliGRAM(s) Oral every 6 hours  mupirocin 2% Nasal 1 Application(s) Both Nostrils every 12 hours  polyethylene glycol 3350 17 Gram(s) Oral daily  senna 2 Tablet(s) Oral at bedtime      RADIOLOGY & ADDITIONAL TESTS:  < from: CT Head No Cont (07.02.23 @ 14:07) >  IMPRESSION:  New focus of low density in the right superior cerebellar hemisphere suggesting the presence of acute infarction (2-15). No david hemorrhagic transformation.      < from: US Duplex Venous Lower Ext Complete, Bilateral (07.02.23 @ 12:22) >  IMPRESSION:  No evidence of deep venous thrombosis in either lower extremity.        ASSESSMENT:  61 y/o female with PMHx of HTN presented to [place] ED 7/1 with left sided facial and arm numbness, h/a and nausea. Initial CTH negative, CTA revealed CTP revealed moderate R PCA 40cc ischemic penumbra involving R occipital lobe and posterior right mesial temporal lobe. 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 S/p tenecteplase 7/1 13:40. At [place], patient in rapid Afib on Cardizem drip, hypotensive started Amiodarone bolus. Transferred to [facility] for further management. S/p cerebral angiogram mechanical thrombectomy TICI 3. Hospital course complicated by hypotension requiring pressors, rapid Afib, s/p Digoxin load x1 dose, remains on PO meds.       PLAN:  - neuro checks q4hrs   - MRI brain pending   - stroke neurology following  - pain control PRN: tylenol      CV:   - 100-180   - Lipitor 40mg, LDL 91    - Metoprolol 25q6   - Cardizem 60q6    - PRN: Hydralazine  - cardiology following, AC when cleared by neurology     Resp:    - RA     GI:   - Regular  - LBM: outpatient   - bowel regimen: Senna, Miralax   - PRN: Zofran       :   - IVL, voiding       Heme:   - DVT ppx: SCDs, SQL 40mg   - ASA 81mg    ID:   - +MSSA bactroban x5days (7/2-7/6)     Endo:    - A1C 5.0  - TSH 0.58, T3 83, T4 7.5    D/w Dr. Nava HPI:  This is a 62y Female  last seen well at 1200 noon. Developed Left sided numbness weaknee. CT CTA CTP revealed L P!- P2 occlusion with no core and 40 ml penumbra.  Patient was given tenecteplase at [facility] and is being transferred to [facility] for possible thrombectomy.  NIH 2 ICH 0 mod liatikin 1   (01 Jul 2023 18:05)    OVERNIGHT EVENTS: Patient doing well, no acute complaints. Remains in Afib, rate controlled overnight, off pressors. Repeat CTH revealed no ICH. Pend MRI brain and echo.     Vital Signs Last 24 Hrs  T(C): 36.6 (03 Jul 2023 07:46), Max: 36.8 (02 Jul 2023 15:30)  T(F): 97.9 (03 Jul 2023 07:46), Max: 98.2 (02 Jul 2023 15:30)  HR: 91 (03 Jul 2023 08:00) (86 - 154)  BP: 100/77 (03 Jul 2023 08:00) (93/61 - 190/158)  BP(mean): 85 (03 Jul 2023 08:00) (71 - 169)  RR: 21 (03 Jul 2023 08:00) (13 - 25)  SpO2: 99% (03 Jul 2023 08:00) (92% - 100%)    Parameters below as of 03 Jul 2023 08:00  Patient On (Oxygen Delivery Method): room air    I&O's Summary    02 Jul 2023 07:01  -  03 Jul 2023 07:00  --------------------------------------------------------  IN: 2276.5 mL / OUT: 650 mL / NET: 1626.5 mL    03 Jul 2023 07:01  -  03 Jul 2023 09:27  --------------------------------------------------------  IN: 50 mL / OUT: 0 mL / NET: 50 mL      PHYSICAL EXAM:  General: NAD, pt is comfortably sitting up in bed, on room air  HEENT: EOMI b/l, face symmetric, tongue midline, neck FROM  Cardiovascular: Regular rate and rhythm  Respiratory: nonlabored breathing, normal chest rise  GI: abdomen soft, nontender, nondistended  Neuro: CN II-XII grossly intact, PERRL 3mm briskly reactive   A&Ox3, No aphasia, speech clear, no dysmetria, no pronator drift. Follows commands.  HEATH x4 spontaneously, 5/5 strength in all extremities throughout. sensation intact  Extremities: distal pulses 2+ x4  Wound/incision: R groin c/d/i    TUBES/LINES:  [] Emery  [] Wound Drains  [] Others      DIET:  [] NPO  [x] Mechanical  [] Tube feeds    LABS:                        12.0   11.33 )-----------( 193      ( 03 Jul 2023 03:58 )             35.8     07-03    135  |  102  |  27.4<H>  ----------------------------<  99  4.1   |  20.0<L>  |  0.86    Ca    8.3<L>      03 Jul 2023 03:58  Phos  2.6     07-03  Mg     2.1     07-03      Urinalysis Basic - ( 03 Jul 2023 03:58 )  Color: x / Appearance: x / SG: x / pH: x  Gluc: 99 mg/dL / Ketone: x  / Bili: x / Urobili: x   Blood: x / Protein: x / Nitrite: x   Leuk Esterase: x / RBC: x / WBC x   Sq Epi: x / Non Sq Epi: x / Bacteria: x    CARDIAC MARKERS ( 01 Jul 2023 18:50 )  x     / <0.01 ng/mL / x     / x     / x        Allergies  azithromycin (Hives; Rash)  penicillin (Hives; Rash)    MEDICATIONS:  acetaminophen     Tablet .. 650 milliGRAM(s) Oral every 6 hours PRN  ondansetron Injectable 4 milliGRAM(s) IV Push every 6 hours PRN  aspirin enteric coated 81 milliGRAM(s) Oral daily  enoxaparin Injectable 40 milliGRAM(s) SubCutaneous <User Schedule>  atorvastatin 40 milliGRAM(s) Oral at bedtime  chlorhexidine 2% Cloths 1 Application(s) Topical daily  diltiazem    Tablet 60 milliGRAM(s) Oral every 6 hours  hydrALAZINE Injectable 10 milliGRAM(s) IV Push every 2 hours PRN  metoprolol tartrate 25 milliGRAM(s) Oral every 6 hours  mupirocin 2% Nasal 1 Application(s) Both Nostrils every 12 hours  polyethylene glycol 3350 17 Gram(s) Oral daily  senna 2 Tablet(s) Oral at bedtime      RADIOLOGY & ADDITIONAL TESTS:  < from: CT Head No Cont (07.02.23 @ 14:07) >  IMPRESSION:  New focus of low density in the right superior cerebellar hemisphere suggesting the presence of acute infarction (2-15). No david hemorrhagic transformation.      < from: US Duplex Venous Lower Ext Complete, Bilateral (07.02.23 @ 12:22) >  IMPRESSION:  No evidence of deep venous thrombosis in either lower extremity.        ASSESSMENT:  63 y/o female with PMHx of HTN presented to [place] ED 7/1 with left sided facial and arm numbness, h/a and nausea. Initial CTH negative, CTA revealed CTP revealed moderate R PCA 40cc ischemic penumbra involving R occipital lobe and posterior right mesial temporal lobe. 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 S/p tenecteplase 7/1 13:40. At [place], patient in rapid Afib on Cardizem drip, hypotensive started Amiodarone bolus. Transferred to [facility] for further management. S/p cerebral angiogram mechanical thrombectomy TICI 3. Hospital course complicated by hypotension requiring pressors, rapid Afib, s/p Digoxin load x1 dose, remains on PO meds.       PLAN:  - neuro checks q4hrs   - MRI brain pending   - stroke neurology following  - pain control PRN: tylenol      CV:   - 100-180   - Lipitor 40mg, LDL 91    - Metoprolol 25q6   - Cardizem 60q6    - PRN: Hydralazine  - cardiology following, AC when cleared by neurology     Resp:    - RA     GI:   - Regular  - LBM: outpatient   - bowel regimen: Senna, Miralax   - PRN: Zofran       :   - IVL, voiding       Heme:   - DVT ppx: SCDs, SQL 40mg   - ASA 81mg    ID:   - +MSSA bactroban x5days (7/2-7/6)     Endo:    - A1C 5.0  - TSH 0.58, T3 83, T4 7.5    D/w Dr. Nava    ------------------------------------------------------    63 yo woman with acute CVA due to R P2 occlusion s/p IV thrombolysis and MT with TICI 3 recanalization. Likely embolic.  New onset Afib (c/o palpitations for approx. a month), on CCB and BB for rate control. High CHADsVASc.  PMH of HTN.    Plan:  cont neurochecks  will eventually need AC for high-risk Afib; pending MRI brain to evaluate extension of ischemia   RICKEY/Neurology involved, will determine timing of initiation of AC  cont ASA, statin  maintain -140, avoid hypotension  HR control with Metoprolol 25 q6hrs, Diltiazem 60 q6h PO; Cardiology involved  stable to be downgraded to Telemetry, Medicine involvement appreciated   rest as above    Time spent - 35 min, non-critical, involved review of relevant history, clinical examination, review of data and images, discussion of treatment with the multidisciplinary team and any consultants involved in this patient’s care.
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
## 2023-07-03 NOTE — DIETITIAN INITIAL EVALUATION ADULT - PERTINENT MEDS FT
MEDICATIONS  (STANDING):  aspirin enteric coated 81 milliGRAM(s) Oral daily  atorvastatin 40 milliGRAM(s) Oral at bedtime  chlorhexidine 2% Cloths 1 Application(s) Topical daily  diltiazem    Tablet 60 milliGRAM(s) Oral every 6 hours  enoxaparin Injectable 40 milliGRAM(s) SubCutaneous <User Schedule>  metoprolol tartrate 25 milliGRAM(s) Oral every 6 hours  mupirocin 2% Nasal 1 Application(s) Both Nostrils every 12 hours  polyethylene glycol 3350 17 Gram(s) Oral daily  senna 2 Tablet(s) Oral at bedtime    MEDICATIONS  (PRN):  acetaminophen     Tablet .. 650 milliGRAM(s) Oral every 6 hours PRN Mild Pain (1 - 3), Moderate Pain (4 - 6)  hydrALAZINE Injectable 10 milliGRAM(s) IV Push every 2 hours PRN SBP > 140  ondansetron Injectable 4 milliGRAM(s) IV Push every 6 hours PRN Nausea and/or Vomiting

## 2023-07-04 LAB
ANION GAP SERPL CALC-SCNC: 12 MMOL/L — SIGNIFICANT CHANGE UP (ref 5–17)
BUN SERPL-MCNC: 22.2 MG/DL — HIGH (ref 8–20)
CALCIUM SERPL-MCNC: 8.7 MG/DL — SIGNIFICANT CHANGE UP (ref 8.4–10.5)
CHLORIDE SERPL-SCNC: 103 MMOL/L — SIGNIFICANT CHANGE UP (ref 96–108)
CO2 SERPL-SCNC: 21 MMOL/L — LOW (ref 22–29)
CREAT SERPL-MCNC: 0.62 MG/DL — SIGNIFICANT CHANGE UP (ref 0.5–1.3)
EGFR: 99 ML/MIN/1.73M2 — SIGNIFICANT CHANGE UP
GLUCOSE SERPL-MCNC: 91 MG/DL — SIGNIFICANT CHANGE UP (ref 70–99)
HCT VFR BLD CALC: 38.5 % — SIGNIFICANT CHANGE UP (ref 34.5–45)
HGB BLD-MCNC: 12.9 G/DL — SIGNIFICANT CHANGE UP (ref 11.5–15.5)
MAGNESIUM SERPL-MCNC: 2.1 MG/DL — SIGNIFICANT CHANGE UP (ref 1.6–2.6)
MCHC RBC-ENTMCNC: 28.8 PG — SIGNIFICANT CHANGE UP (ref 27–34)
MCHC RBC-ENTMCNC: 33.5 GM/DL — SIGNIFICANT CHANGE UP (ref 32–36)
MCV RBC AUTO: 85.9 FL — SIGNIFICANT CHANGE UP (ref 80–100)
PHOSPHATE SERPL-MCNC: 2.2 MG/DL — LOW (ref 2.4–4.7)
PLATELET # BLD AUTO: 217 K/UL — SIGNIFICANT CHANGE UP (ref 150–400)
POTASSIUM SERPL-MCNC: 4 MMOL/L — SIGNIFICANT CHANGE UP (ref 3.5–5.3)
POTASSIUM SERPL-SCNC: 4 MMOL/L — SIGNIFICANT CHANGE UP (ref 3.5–5.3)
RBC # BLD: 4.48 M/UL — SIGNIFICANT CHANGE UP (ref 3.8–5.2)
RBC # FLD: 13.5 % — SIGNIFICANT CHANGE UP (ref 10.3–14.5)
SODIUM SERPL-SCNC: 136 MMOL/L — SIGNIFICANT CHANGE UP (ref 135–145)
WBC # BLD: 12.74 K/UL — HIGH (ref 3.8–10.5)
WBC # FLD AUTO: 12.74 K/UL — HIGH (ref 3.8–10.5)

## 2023-07-04 PROCEDURE — 99233 SBSQ HOSP IP/OBS HIGH 50: CPT

## 2023-07-04 PROCEDURE — 99232 SBSQ HOSP IP/OBS MODERATE 35: CPT

## 2023-07-04 RX ORDER — DILTIAZEM HCL 120 MG
5 CAPSULE, EXT RELEASE 24 HR ORAL ONCE
Refills: 0 | Status: COMPLETED | OUTPATIENT
Start: 2023-07-04 | End: 2023-07-04

## 2023-07-04 RX ORDER — METOPROLOL TARTRATE 50 MG
50 TABLET ORAL EVERY 8 HOURS
Refills: 0 | Status: DISCONTINUED | OUTPATIENT
Start: 2023-07-04 | End: 2023-07-06

## 2023-07-04 RX ORDER — METOPROLOL TARTRATE 50 MG
5 TABLET ORAL EVERY 6 HOURS
Refills: 0 | Status: DISCONTINUED | OUTPATIENT
Start: 2023-07-04 | End: 2023-07-12

## 2023-07-04 RX ADMIN — MUPIROCIN 1 APPLICATION(S): 20 OINTMENT TOPICAL at 05:11

## 2023-07-04 RX ADMIN — POLYETHYLENE GLYCOL 3350 17 GRAM(S): 17 POWDER, FOR SOLUTION ORAL at 13:29

## 2023-07-04 RX ADMIN — ENOXAPARIN SODIUM 40 MILLIGRAM(S): 100 INJECTION SUBCUTANEOUS at 21:13

## 2023-07-04 RX ADMIN — Medication 60 MILLIGRAM(S): at 10:13

## 2023-07-04 RX ADMIN — SENNA PLUS 2 TABLET(S): 8.6 TABLET ORAL at 21:11

## 2023-07-04 RX ADMIN — Medication 60 MILLIGRAM(S): at 16:47

## 2023-07-04 RX ADMIN — ATORVASTATIN CALCIUM 40 MILLIGRAM(S): 80 TABLET, FILM COATED ORAL at 21:12

## 2023-07-04 RX ADMIN — Medication 81 MILLIGRAM(S): at 13:28

## 2023-07-04 RX ADMIN — MUPIROCIN 1 APPLICATION(S): 20 OINTMENT TOPICAL at 17:51

## 2023-07-04 RX ADMIN — Medication 5 MILLIGRAM(S): at 00:45

## 2023-07-04 RX ADMIN — Medication 50 MILLIGRAM(S): at 13:31

## 2023-07-04 RX ADMIN — Medication 50 MILLIGRAM(S): at 21:12

## 2023-07-04 RX ADMIN — Medication 5 MILLIGRAM(S): at 06:38

## 2023-07-04 RX ADMIN — Medication 25 MILLIGRAM(S): at 00:12

## 2023-07-04 RX ADMIN — CHLORHEXIDINE GLUCONATE 1 APPLICATION(S): 213 SOLUTION TOPICAL at 05:11

## 2023-07-04 RX ADMIN — Medication 60 MILLIGRAM(S): at 04:54

## 2023-07-04 RX ADMIN — Medication 25 MILLIGRAM(S): at 05:05

## 2023-07-04 RX ADMIN — Medication 60 MILLIGRAM(S): at 21:12

## 2023-07-04 NOTE — PROGRESS NOTE ADULT - SUBJECTIVE AND OBJECTIVE BOX
[facility]                                                         CARDIOLOGY AT [place] [address]                                                         Telephone: [phone]. Fax:[phone]                                                                             PROGRESS NOTE    Reason for follow up:   Stroke/Afib  Update:       Review of symptoms:   Cardiac:  No chest pain. No dyspnea. No palpitations.  Respiratory: no cough. No dyspnea  Gastrointestinal: No diarrhea. No abdominal pain. No bleeding.   Neuro: No focal neuro complaints.    Vitals:  T(C): 36.8 (07-04-23 @ 04:54), Max: 37 (07-03-23 @ 20:33)  HR: 122 (07-04-23 @ 04:54) (91 - 137)  BP: 133/70 (07-04-23 @ 04:54) (93/67 - 137/77)  RR: 19 (07-04-23 @ 04:54) (18 - 24)  SpO2: 100% (07-04-23 @ 04:54) (96% - 100%)    I&O's Summary  03 Jul 2023 07:01  -  04 Jul 2023 07:00  --------------------------------------------------------  IN: 1050 mL / OUT: 500 mL / NET: 550 mL    Weight (kg): 100.8 (07-01 @ 17:52)    PHYSICAL EXAM:  Appearance: Comfortable. No acute distress  HEENT:  Atraumatic. Normocephalic.  Normal oral mucosa  Neurologic: A & O x 3, no gross focal deficits.  Cardiovascular: RRR S1 S2, No murmur, no rubs/gallops. No JVD  Respiratory: Lungs clear to auscultation, unlabored   Gastrointestinal:  Soft, Non-tender, + BS  Lower Extremities: 2+ Peripheral Pulses, No clubbing, cyanosis, or edema  Psychiatry: Patient is calm. No agitation.   Skin: warm and dry.    CURRENT CARDIAC MEDICATIONS:  diltiazem    Tablet 60 milliGRAM(s) Oral every 6 hours  hydrALAZINE Injectable 10 milliGRAM(s) IV Push every 4 hours PRN  metoprolol tartrate 25 milliGRAM(s) Oral every 6 hours    CURRENT OTHER MEDICATIONS:  acetaminophen     Tablet .. 650 milliGRAM(s) Oral every 6 hours PRN Mild Pain (1 - 3), Moderate Pain (4 - 6)  ondansetron Injectable 4 milliGRAM(s) IV Push every 6 hours PRN Nausea and/or Vomiting  polyethylene glycol 3350 17 Gram(s) Oral daily  senna 2 Tablet(s) Oral at bedtime  atorvastatin 40 milliGRAM(s) Oral at bedtime  aspirin enteric coated 81 milliGRAM(s) Oral daily  chlorhexidine 2% Cloths 1 Application(s) Topical daily  enoxaparin Injectable 40 milliGRAM(s) SubCutaneous <User Schedule>  mupirocin 2% Nasal 1 Application(s) Both Nostrils every 12 hours, Stop order after: 5 Days    LABS:	 	  ( 01 Jul 2023 18:50 )  Troponin T  <0.01,  CPK  X    , CKMB  X    , BNP X                            12.9   12.74 )-----------( 217      ( 04 Jul 2023 06:41 )             38.5     07-03    135  |  102  |  27.4<H>  ----------------------------<  99  4.1   |  20.0<L>  |  0.86    Ca    8.3<L>      03 Jul 2023 03:58  Phos  2.6     07-03  Mg     2.1     07-03      Lipid Profile: Date: 07-02 @ 03:50  Total cholesterol 146; Direct LDL: --; HDL: 43; Triglycerides:58    HgA1c:   TSH: Thyroid Stimulating Hormone, Serum: 0.58 uIU/mL    TELEMETRY:                                                               [facility]                                                         CARDIOLOGY AT [facility]                                                                  [address], [place] [address]                                                         Telephone: [phone]. Fax:[phone]                                                                             PROGRESS NOTE    Reason for follow up:   Stroke/Afib  Update:   afib continues heart 90s, with occasional pauses < 2 seconds.    MRI pending  Review of symptoms:   Cardiac:  No chest pain. No dyspnea. No palpitations.  Respiratory: no cough. No dyspnea  Gastrointestinal: No diarrhea. No abdominal pain. No bleeding.   Neuro: No focal neuro complaints.    Vitals:  T(C): 36.8 (07-04-23 @ 04:54), Max: 37 (07-03-23 @ 20:33)  HR: 122 (07-04-23 @ 04:54) (91 - 137)  BP: 133/70 (07-04-23 @ 04:54) (93/67 - 137/77)  RR: 19 (07-04-23 @ 04:54) (18 - 24)  SpO2: 100% (07-04-23 @ 04:54) (96% - 100%)    I&O's Summary  03 Jul 2023 07:01  -  04 Jul 2023 07:00  --------------------------------------------------------  IN: 1050 mL / OUT: 500 mL / NET: 550 mL    Weight (kg): 100.8 (07-01 @ 17:52)    PHYSICAL EXAM:  Appearance: Comfortable. No acute distress  HEENT:  Atraumatic. Normocephalic.  Normal oral mucosa  Neurologic: A & O x 3, no gross focal deficits.  Cardiovascular: Irregular, S1 S2, No murmur, no rubs/gallops. No JVD  Respiratory: Lungs clear to auscultation, unlabored   Gastrointestinal:  Soft, Non-tender, + BS  Lower Extremities: + Peripheral Pulses, No clubbing, cyanosis, or edema  Psychiatry: Patient is calm. No agitation.   Skin: warm and dry.    CURRENT CARDIAC MEDICATIONS:  diltiazem    Tablet 60 milliGRAM(s) Oral every 6 hours  hydrALAZINE Injectable 10 milliGRAM(s) IV Push every 4 hours PRN  metoprolol tartrate 25 milliGRAM(s) Oral every 6 hours    CURRENT OTHER MEDICATIONS:  acetaminophen     Tablet .. 650 milliGRAM(s) Oral every 6 hours PRN Mild Pain (1 - 3), Moderate Pain (4 - 6)  ondansetron Injectable 4 milliGRAM(s) IV Push every 6 hours PRN Nausea and/or Vomiting  polyethylene glycol 3350 17 Gram(s) Oral daily  senna 2 Tablet(s) Oral at bedtime  atorvastatin 40 milliGRAM(s) Oral at bedtime  aspirin enteric coated 81 milliGRAM(s) Oral daily  chlorhexidine 2% Cloths 1 Application(s) Topical daily  enoxaparin Injectable 40 milliGRAM(s) SubCutaneous <User Schedule>  mupirocin 2% Nasal 1 Application(s) Both Nostrils every 12 hours, Stop order after: 5 Days    LABS:	 	  ( 01 Jul 2023 18:50 )  Troponin T  <0.01,  CPK  X    , CKMB  X    , BNP X                            12.9   12.74 )-----------( 217      ( 04 Jul 2023 06:41 )             38.5     07-03    135  |  102  |  27.4<H>  ----------------------------<  99  4.1   |  20.0<L>  |  0.86    Ca    8.3<L>      03 Jul 2023 03:58  Phos  2.6     07-03  Mg     2.1     07-03      Lipid Profile: Date: 07-02 @ 03:50  Total cholesterol 146; Direct LDL: --; HDL: 43; Triglycerides:58    HgA1c:   TSH: Thyroid Stimulating Hormone, Serum: 0.58 uIU/mL    TELEMETRY:  afib 90's, occasional pauses < 2 seconds.

## 2023-07-04 NOTE — PROGRESS NOTE ADULT - PROBLEM SELECTOR PLAN 1
Continue Lopressor and Cardizem  TTE  Outpatient ischemic evaluation  CHADSVASC 4, start DOAC when cleared by neurology. Continue Lopressor and Cardizem  TTE pending read  Outpatient ischemic evaluation  CHADSVASC 4, start DOAC when cleared by neurology.

## 2023-07-04 NOTE — PROGRESS NOTE ADULT - ASSESSMENT
63 y/o female with hx of HTN, remote hyperthyroid, family hx of CVA (mother, brother) who presented to [facility] with left-sided numbness, weakness and slurred speech. Head CTA revealed right PCA near-complete occlusion at P1/P2 junction. Pt was given tenecteplase and she was then transferred to [facility] for possible intervention. She is now s/p cerebral angiography and suction thrombectomy.   Pt was found to have Afib with RVR on telemetry. She denies any prior hx of Afib but endorses recent feeling of fluttering in her chest. Pt denies chest pain or SOB. EKG at [place] shows Afib with ST depressions and TWI in lateral leads. Pt does not have a cardiologist.

## 2023-07-04 NOTE — PROGRESS NOTE ADULT - SUBJECTIVE AND OBJECTIVE BOX
WINNIE PEREZ  ----------------------------------------  The patient was seen at bedside. Patient with stroke and atrial fibrillation. Offered no complaint. Denied chest pain or palpitations.    Vital Signs Last 24 Hrs  T(C): 37 (04 Jul 2023 08:00), Max: 37 (03 Jul 2023 20:33)  T(F): 98.6 (04 Jul 2023 08:00), Max: 98.6 (03 Jul 2023 20:33)  HR: 90 (04 Jul 2023 10:19) (90 - 137)  BP: 118/87 (04 Jul 2023 10:19) (93/67 - 137/77)  BP(mean): 91 (04 Jul 2023 04:54) (76 - 95)  RR: 19 (04 Jul 2023 08:00) (19 - 24)  SpO2: 100% (04 Jul 2023 08:00) (96% - 100%)    Parameters below as of 04 Jul 2023 08:00  Patient On (Oxygen Delivery Method): room air    PHYSICAL EXAMINATION:  ----------------------------------------  General appearance: No acute distress, Awake, Alert  HEENT: Normocephalic, Atraumatic, Conjunctiva clear, EOMI  Neck: Supple, No JVD, No tenderness  Lungs: Breath sound equal bilaterally, No wheezes, No rales  Cardiovascular: S1S2, Regular rhythm  Abdomen: Soft, Nontender, Nondistended, No guarding/rebound, Positive bowel sounds  Extremities: No clubbing, No cyanosis, No edema, No calf tenderness  Neuro: Strength equal bilaterally, No tremors, Minimal decreased sensation in the left fingertips  Psychiatric: Appropriate mood, Normal affect    LABORATORY STUDIES:  ----------------------------------------             12.9   12.74 )-----------( 217      ( 04 Jul 2023 06:41 )             38.5     07-04    136  |  103  |  22.2<H>  ----------------------------<  91  4.0   |  21.0<L>  |  0.62    Ca    8.7      04 Jul 2023 06:41  Phos  2.2     07-04  Mg     2.1     07-04    Urinalysis Basic - ( 04 Jul 2023 06:41 )  Color: x / Appearance: x / SG: x / pH: x  Gluc: 91 mg/dL / Ketone: x  / Bili: x / Urobili: x   Blood: x / Protein: x / Nitrite: x   Leuk Esterase: x / RBC: x / WBC x   Sq Epi: x / Non Sq Epi: x / Bacteria: x    MEDICATIONS  (STANDING):  aspirin enteric coated 81 milliGRAM(s) Oral daily  atorvastatin 40 milliGRAM(s) Oral at bedtime  chlorhexidine 2% Cloths 1 Application(s) Topical daily  diltiazem    Tablet 60 milliGRAM(s) Oral every 6 hours  enoxaparin Injectable 40 milliGRAM(s) SubCutaneous <User Schedule>  metoprolol tartrate 50 milliGRAM(s) Oral every 8 hours  mupirocin 2% Nasal 1 Application(s) Both Nostrils every 12 hours  polyethylene glycol 3350 17 Gram(s) Oral daily  senna 2 Tablet(s) Oral at bedtime    MEDICATIONS  (PRN):  acetaminophen     Tablet .. 650 milliGRAM(s) Oral every 6 hours PRN Mild Pain (1 - 3), Moderate Pain (4 - 6)  hydrALAZINE Injectable 10 milliGRAM(s) IV Push every 4 hours PRN SBP>180  metoprolol tartrate Injectable 5 milliGRAM(s) IV Push every 6 hours PRN for sustain Afib >130  ondansetron Injectable 4 milliGRAM(s) IV Push every 6 hours PRN Nausea and/or Vomiting      ASSESSMENT / PLAN:  ----------------------------------------  62F with a history of hypertension who presented to [facility] with left sided numbness and found to have occlusion of the right posterior cerebral artery. Tenecteplase was administered and the patient was transferred to [facility] for evaluation. The patient underwent mechanical thrombectomy and was monitored in the Neurosurgical Intensive Care Unit. The patient was noted to have atrial fibrillation with initiation of metoprolol and enoxaparin.    Stroke - Status post tenecteplase administration at [facility] and mechanical thrombectomy. On aspirin and atorvastatin. Continue to monitor status with serial neurological examinations. Pending MRI.    Atrial fibrillation - On diltiazem and metoprolol. On enoxaparin for anticoagulation.    Leukocytosis - Suspect reactive in nature. Afebrile. WINNIE PEREZ  ----------------------------------------  The patient was seen at bedside. Patient with stroke and atrial fibrillation. Offered no complaint. Denied chest pain or palpitations.    Vital Signs Last 24 Hrs  T(C): 37 (04 Jul 2023 08:00), Max: 37 (03 Jul 2023 20:33)  T(F): 98.6 (04 Jul 2023 08:00), Max: 98.6 (03 Jul 2023 20:33)  HR: 90 (04 Jul 2023 10:19) (90 - 137)  BP: 118/87 (04 Jul 2023 10:19) (93/67 - 137/77)  BP(mean): 91 (04 Jul 2023 04:54) (76 - 95)  RR: 19 (04 Jul 2023 08:00) (19 - 24)  SpO2: 100% (04 Jul 2023 08:00) (96% - 100%)    Parameters below as of 04 Jul 2023 08:00  Patient On (Oxygen Delivery Method): room air    PHYSICAL EXAMINATION:  ----------------------------------------  General appearance: No acute distress, Awake, Alert  HEENT: Normocephalic, Atraumatic, Conjunctiva clear, EOMI  Neck: Supple, No JVD, No tenderness  Lungs: Breath sound equal bilaterally, No wheezes, No rales  Cardiovascular: S1S2, Regular rhythm  Abdomen: Soft, Nontender, Nondistended, No guarding/rebound, Positive bowel sounds  Extremities: No clubbing, No cyanosis, No edema, No calf tenderness  Neuro: Strength equal bilaterally, No tremors, Minimal decreased sensation in the left fingertips  Psychiatric: Appropriate mood, Normal affect    LABORATORY STUDIES:  ----------------------------------------             12.9   12.74 )-----------( 217      ( 04 Jul 2023 06:41 )             38.5     07-04    136  |  103  |  22.2<H>  ----------------------------<  91  4.0   |  21.0<L>  |  0.62    Ca    8.7      04 Jul 2023 06:41  Phos  2.2     07-04  Mg     2.1     07-04    Urinalysis Basic - ( 04 Jul 2023 06:41 )  Color: x / Appearance: x / SG: x / pH: x  Gluc: 91 mg/dL / Ketone: x  / Bili: x / Urobili: x   Blood: x / Protein: x / Nitrite: x   Leuk Esterase: x / RBC: x / WBC x   Sq Epi: x / Non Sq Epi: x / Bacteria: x    MEDICATIONS  (STANDING):  aspirin enteric coated 81 milliGRAM(s) Oral daily  atorvastatin 40 milliGRAM(s) Oral at bedtime  chlorhexidine 2% Cloths 1 Application(s) Topical daily  diltiazem    Tablet 60 milliGRAM(s) Oral every 6 hours  enoxaparin Injectable 40 milliGRAM(s) SubCutaneous <User Schedule>  metoprolol tartrate 50 milliGRAM(s) Oral every 8 hours  mupirocin 2% Nasal 1 Application(s) Both Nostrils every 12 hours  polyethylene glycol 3350 17 Gram(s) Oral daily  senna 2 Tablet(s) Oral at bedtime    MEDICATIONS  (PRN):  acetaminophen     Tablet .. 650 milliGRAM(s) Oral every 6 hours PRN Mild Pain (1 - 3), Moderate Pain (4 - 6)  hydrALAZINE Injectable 10 milliGRAM(s) IV Push every 4 hours PRN SBP>180  metoprolol tartrate Injectable 5 milliGRAM(s) IV Push every 6 hours PRN for sustain Afib >130  ondansetron Injectable 4 milliGRAM(s) IV Push every 6 hours PRN Nausea and/or Vomiting      ASSESSMENT / PLAN:  ----------------------------------------  62F with a history of hypertension who presented to [facility] with left sided numbness and found to have occlusion of the right posterior cerebral artery. Tenecteplase was administered and the patient was transferred to [facility] for evaluation. The patient underwent mechanical thrombectomy and was monitored in the Neurosurgical Intensive Care Unit. The patient was noted to have atrial fibrillation with initiation of metoprolol and enoxaparin.    Stroke - Status post tenecteplase administration at [facility] and mechanical thrombectomy. On aspirin and atorvastatin. Continue to monitor status with serial neurological examinations. Pending MRI.    Atrial fibrillation - On diltiazem and metoprolol. Pending decision for anticoagulation.    Leukocytosis - Suspect reactive in nature. Afebrile.

## 2023-07-05 ENCOUNTER — NON-APPOINTMENT (OUTPATIENT)
Age: 63
End: 2023-07-05

## 2023-07-05 PROCEDURE — 99233 SBSQ HOSP IP/OBS HIGH 50: CPT

## 2023-07-05 PROCEDURE — 99222 1ST HOSP IP/OBS MODERATE 55: CPT

## 2023-07-05 PROCEDURE — 99232 SBSQ HOSP IP/OBS MODERATE 35: CPT

## 2023-07-05 RX ORDER — LIDOCAINE 4 G/100G
1 CREAM TOPICAL ONCE
Refills: 0 | Status: COMPLETED | OUTPATIENT
Start: 2023-07-05 | End: 2023-07-05

## 2023-07-05 RX ORDER — ALPRAZOLAM 0.25 MG
0.5 TABLET ORAL ONCE
Refills: 0 | Status: COMPLETED | OUTPATIENT
Start: 2023-07-05 | End: 2023-07-12

## 2023-07-05 RX ORDER — ALPRAZOLAM 0.25 MG
0.5 TABLET ORAL ONCE
Refills: 0 | Status: DISCONTINUED | OUTPATIENT
Start: 2023-07-05 | End: 2023-07-05

## 2023-07-05 RX ORDER — DILTIAZEM HCL 120 MG
90 CAPSULE, EXT RELEASE 24 HR ORAL EVERY 6 HOURS
Refills: 0 | Status: DISCONTINUED | OUTPATIENT
Start: 2023-07-05 | End: 2023-07-06

## 2023-07-05 RX ADMIN — MUPIROCIN 1 APPLICATION(S): 20 OINTMENT TOPICAL at 05:08

## 2023-07-05 RX ADMIN — LIDOCAINE 1 PATCH: 4 CREAM TOPICAL at 23:35

## 2023-07-05 RX ADMIN — Medication 650 MILLIGRAM(S): at 13:41

## 2023-07-05 RX ADMIN — Medication 50 MILLIGRAM(S): at 13:43

## 2023-07-05 RX ADMIN — Medication 81 MILLIGRAM(S): at 09:38

## 2023-07-05 RX ADMIN — ENOXAPARIN SODIUM 40 MILLIGRAM(S): 100 INJECTION SUBCUTANEOUS at 21:36

## 2023-07-05 RX ADMIN — Medication 60 MILLIGRAM(S): at 04:10

## 2023-07-05 RX ADMIN — MUPIROCIN 1 APPLICATION(S): 20 OINTMENT TOPICAL at 17:27

## 2023-07-05 RX ADMIN — Medication 650 MILLIGRAM(S): at 14:41

## 2023-07-05 RX ADMIN — Medication 90 MILLIGRAM(S): at 23:35

## 2023-07-05 RX ADMIN — CHLORHEXIDINE GLUCONATE 1 APPLICATION(S): 213 SOLUTION TOPICAL at 05:09

## 2023-07-05 RX ADMIN — Medication 60 MILLIGRAM(S): at 09:40

## 2023-07-05 RX ADMIN — ATORVASTATIN CALCIUM 40 MILLIGRAM(S): 80 TABLET, FILM COATED ORAL at 21:37

## 2023-07-05 RX ADMIN — Medication 90 MILLIGRAM(S): at 17:27

## 2023-07-05 RX ADMIN — Medication 50 MILLIGRAM(S): at 21:37

## 2023-07-05 RX ADMIN — SENNA PLUS 2 TABLET(S): 8.6 TABLET ORAL at 21:36

## 2023-07-05 RX ADMIN — Medication 50 MILLIGRAM(S): at 05:08

## 2023-07-05 NOTE — PROGRESS NOTE ADULT - SUBJECTIVE AND OBJECTIVE BOX
[facility]                                                         CARDIOLOGY AT [facility]                                                                  [address] [address]                                                         Telephone: [phone]. Fax:[phone]                                                                             PROGRESS NOTE    Reason for follow up: CVA/Afib  Update: Afib low 100s with rates occasionally to 130s. Pending MRI, DOAC to start when cleared by Neuro team      Review of symptoms:   Cardiac:  No chest pain. No dyspnea. No palpitations.  Respiratory: no cough. No dyspnea  Gastrointestinal: No diarrhea. No abdominal pain. No bleeding.   Neuro: No focal neuro complaints.    Vitals:  T(C): 36.7 (07-05-23 @ 08:32), Max: 36.8 (07-04-23 @ 17:56)  HR: 118 (07-05-23 @ 09:39) (66 - 130)  BP: 124/75 (07-05-23 @ 08:32) (105/72 - 135/79)  RR: 18 (07-05-23 @ 08:32) (16 - 20)  SpO2: 99% (07-05-23 @ 08:32) (95% - 100%)  Wt(kg): --  I&O's Summary    04 Jul 2023 07:01  -  05 Jul 2023 07:00  --------------------------------------------------------  IN: 590 mL / OUT: 0 mL / NET: 590 mL    05 Jul 2023 07:01  -  05 Jul 2023 11:54  --------------------------------------------------------  IN: 240 mL / OUT: 0 mL / NET: 240 mL      Weight (kg): 100.8 (07-01 @ 17:52)    PHYSICAL EXAM:  Appearance: Comfortable. No acute distress  HEENT:  Atraumatic. Normocephalic.  Normal oral mucosa  Neurologic: A & O x 3, no gross focal deficits.  Cardiovascular: irreg irregular S1 S2, No murmur, no rubs/gallops. No JVD  Respiratory: Lungs clear to auscultation, unlabored   Gastrointestinal:  Soft, Non-tender, + BS  Lower Extremities: 2+ Peripheral Pulses, No clubbing, cyanosis, or edema  Psychiatry: Patient is calm. No agitation.   Skin: warm and dry.    CURRENT CARDIAC MEDICATIONS:  diltiazem    Tablet 60 milliGRAM(s) Oral every 6 hours  hydrALAZINE Injectable 10 milliGRAM(s) IV Push every 4 hours PRN  metoprolol tartrate 50 milliGRAM(s) Oral every 8 hours  metoprolol tartrate Injectable 5 milliGRAM(s) IV Push every 6 hours PRN      CURRENT OTHER MEDICATIONS:  acetaminophen     Tablet .. 650 milliGRAM(s) Oral every 6 hours PRN Mild Pain (1 - 3), Moderate Pain (4 - 6)  ALPRAZolam 0.5 milliGRAM(s) Oral once  ondansetron Injectable 4 milliGRAM(s) IV Push every 6 hours PRN Nausea and/or Vomiting  polyethylene glycol 3350 17 Gram(s) Oral daily  senna 2 Tablet(s) Oral at bedtime  atorvastatin 40 milliGRAM(s) Oral at bedtime  aspirin enteric coated 81 milliGRAM(s) Oral daily  chlorhexidine 2% Cloths 1 Application(s) Topical daily  enoxaparin Injectable 40 milliGRAM(s) SubCutaneous <User Schedule>  mupirocin 2% Nasal 1 Application(s) Both Nostrils every 12 hours, Stop order after: 5 Days      LABS:	 	  ( 01 Jul 2023 18:50 )  Troponin T  <0.01,  CPK  X    , CKMB  X    , BNP X                                  12.9   12.74 )-----------( 217      ( 04 Jul 2023 06:41 )             38.5     07-04    136  |  103  |  22.2<H>  ----------------------------<  91  4.0   |  21.0<L>  |  0.62    Ca    8.7      04 Jul 2023 06:41  Phos  2.2     07-04  Mg     2.1     07-04        Lipid Profile: Date: 07-02 @ 03:50  Total cholesterol 146; Direct LDL: --; HDL: 43; Triglycerides:58    HgA1c:   TSH: Thyroid Stimulating Hormone, Serum: 0.58 uIU/mL      TELEMETRY: Afib   ECG:    DIAGNOSTIC TESTING:  [ ] Echocardiogram: < from: TTE Echo Complete w/o Contrast w/ Doppler (07.03.23 @ 16:27) >  PHYSICIAN INTERPRETATION:  Left Ventricle: Endocardial visualization was enhanced with intravenous   echo contrast. The left ventricular internal cavity sizeis normal.  Global LV systolic function was normal. Left ventricular ejection   fraction, by visual estimation, is 60 to 65%.  Right Ventricle: The right ventricle was not well visualized.  Left Atrium: Moderately enlarged left atrium. Color flow doppler and   intravenous injection of agitated saline demonstrates the presence of an   intact intra atrial septum.  Right Atrium: Normal right atrial size.  Pericardium: Trivial pericardial effusion is present. The pericardial   effusion is posterior to the left ventricle.  Mitral Valve: Mild thickening and calcification of the anterior and   posterior mitral valve leaflets. There is mild to moderate mitral annular   calcification. Mild mitral valve regurgitation is seen.  Tricuspid Valve: The tricuspid valve is not well seen. Mild-moderate   tricuspid regurgitation is visualized.  Aortic Valve: The aortic valve was not well visualized. Sclerotic aortic   valve with normal opening. Trivial aortic valve regurgitation is seen.  Pulmonic Valve: The pulmonic valve was not well visualized. Trace   pulmonic valve regurgitation.  Aorta: The aortic root is normal in size and structure.  Pulmonary Artery: The pulmonary artery is of normal size and origin.  Venous: The pulmonary veins were not well visualized. The inferior vena   cava was normal sized, with respiratory size variation less than 50%.  Shunts: Agitated saline contrast was given intravenously to evaluate for   intracardiac shunting.      Summary:   1. Technically difficult study.   2. Endocardial visualization was enhanced with intravenous echo contrast.   3. Normal left ventricular internal cavity size.   4. Normal global left ventricular systolic function.   5. Left ventricular ejection fraction, by visual estimation, is 60 to   65%.   6. Moderately enlarged left atrium.   7. Normal right atrial size.   8. Sclerotic aortic valve with normal opening.   9. Mild thickening and calcification of the anterior and posterior   mitral valve leaflets.  10. Mild mitral valve regurgitation.  11. Mild to moderate mitral annular calcification.  12. Mild-moderate tricuspid regurgitation.  13. Color flow doppler and intravenous injection of agitated saline is   limited due the study quality.  14. Trivial pericardial effusion.    MD Colton Electronically signed on 7/4/2023 at 1:19:14 PM    < end of copied text >    [ ]  Catheterization:  [ ] Stress Test:    OTHER:

## 2023-07-05 NOTE — CONSULT NOTE ADULT - ASSESSMENT
Teagan Odell is a 63 year old woman with hypertension, remote hyperthyroidism, family history of CVA (mother & brother) who presented to [facility] with acute left sided numbness, weakness and aphasia found to have acute R PCA stroke s/p TNK + thrombectomy who was noted to have AF for which EP is consulted.    We had a thorough conversation about AF and management options. Given her CHADS2-VASc of 4, she should be anticoagulated indefinitely. I would pursue a rate control approach for now but since she is only 63, I believe long term she would benefit with a rhythm control strategy. We will pursue this as an outpatient. Diet and exercise was also encouraged.    Recommendations:  - Change Metoprolol to Toprol  daily  - Change Diltiazem to long acting formulation - 240mg daily  - Continue Apixaban 5mg BID for [id number] of 4 (HTN, Age2, CVA, Female)  - Outpt MIRANDA/CV    Outpt follow up at [facility] in 4-6 weeks to discuss rhythm control options.    Thank you for this consultation. EP will sign off. Please contact EP team with any questions.    John Wiggins MD  Clinical Cardiac Electrophysiology  
IMPRESSION:    R PCA occlusion S/P  tenecteplase and thrombectomy with TICI 3 reperfusion on 7-1-23  R cerebellar infarct seen on CT head  New onset Afib.    ASSESSMENT/ PLAN:   - Neuro checks and vital signs  as per post IV tenecteplase protocol  - SBP goal permissive but less than 180/105 mm Hg.  - No antiplatelets or anticoagulation for 24 hrs post IV tenecteplase.  - Lipitor  for LDL goal of < 70 when cleared by dysphagia screen.  - Telemetry monitoring.  - CT head 24 hrs post IV tenecteplase.  - MRI Brain stroke protocol when stable.  - Check fasting Lipid panel and HbA1c  - TTE with bubble study.  - Cardiology consultation.  - PT OT SLP evaluation.  - SCD/  for DVT prophylaxis.      
This is 63 y/o female with hx of HTN, remote hyperthyroid, family hx of CVA (mother, brother) who presented to [facility] with left-sided numbness, weakness and slurred speech. Head CTA revealed right PCA near-complete occlusion at P1/P2 junction. Pt was given tenecteplase and she was then transferred to [facility] for possible intervention. She is now s/p cerebral angiography and suction thrombectomy.   Pt was found to have Afib with RVR on telemetry. She denies any prior hx of Afib but endorses recent feeling of fluttering in her chest. Pt denies chest pain or SOB. EKG at [place] shows Afib with ST depressions and TWI in lateral leads. Pt does not have a cardiologist.

## 2023-07-05 NOTE — PROGRESS NOTE ADULT - SUBJECTIVE AND OBJECTIVE BOX
WINNIE PEREZ  ----------------------------------------  The patient was seen at bedside. Patient with stroke. Offered no complaints. Denied weakness or numbness.    Vital Signs Last 24 Hrs  T(C): 36.7 (05 Jul 2023 08:32), Max: 36.8 (04 Jul 2023 17:56)  T(F): 98 (05 Jul 2023 08:32), Max: 98.3 (05 Jul 2023 00:45)  HR: 118 (05 Jul 2023 09:39) (66 - 130)  BP: 124/75 (05 Jul 2023 08:32) (105/72 - 135/79)  BP(mean): --  RR: 18 (05 Jul 2023 08:32) (16 - 20)  SpO2: 99% (05 Jul 2023 08:32) (95% - 100%)    Parameters below as of 05 Jul 2023 08:32  Patient On (Oxygen Delivery Method): room air    PHYSICAL EXAMINATION:  ----------------------------------------  General appearance: No acute distress, Awake, Alert  HEENT: Normocephalic, Atraumatic, Conjunctiva clear, EOMI  Neck: Supple, No JVD, No tenderness  Lungs: Breath sound equal bilaterally, No wheezes, No rales  Cardiovascular: S1S2, Regular rhythm  Abdomen: Soft, Nontender, Nondistended, No guarding/rebound, Positive bowel sounds  Extremities: No clubbing, No cyanosis, No edema, No calf tenderness  Neuro: Strength equal bilaterally, No tremors  Psychiatric: Appropriate mood, Normal affect    LABORATORY STUDIES:  ----------------------------------------             12.9   12.74 )-----------( 217      ( 04 Jul 2023 06:41 )             38.5     07-04    136  |  103  |  22.2<H>  ----------------------------<  91  4.0   |  21.0<L>  |  0.62    Ca    8.7      04 Jul 2023 06:41  Phos  2.2     07-04  Mg     2.1     07-04    Urinalysis Basic - ( 04 Jul 2023 06:41 )  Color: x / Appearance: x / SG: x / pH: x  Gluc: 91 mg/dL / Ketone: x  / Bili: x / Urobili: x   Blood: x / Protein: x / Nitrite: x   Leuk Esterase: x / RBC: x / WBC x   Sq Epi: x / Non Sq Epi: x / Bacteria: x    MEDICATIONS  (STANDING):  ALPRAZolam 0.5 milliGRAM(s) Oral once  aspirin enteric coated 81 milliGRAM(s) Oral daily  atorvastatin 40 milliGRAM(s) Oral at bedtime  chlorhexidine 2% Cloths 1 Application(s) Topical daily  diltiazem    Tablet 60 milliGRAM(s) Oral every 6 hours  enoxaparin Injectable 40 milliGRAM(s) SubCutaneous <User Schedule>  metoprolol tartrate 50 milliGRAM(s) Oral every 8 hours  mupirocin 2% Nasal 1 Application(s) Both Nostrils every 12 hours  polyethylene glycol 3350 17 Gram(s) Oral daily  senna 2 Tablet(s) Oral at bedtime    MEDICATIONS  (PRN):  acetaminophen     Tablet .. 650 milliGRAM(s) Oral every 6 hours PRN Mild Pain (1 - 3), Moderate Pain (4 - 6)  hydrALAZINE Injectable 10 milliGRAM(s) IV Push every 4 hours PRN SBP>180  metoprolol tartrate Injectable 5 milliGRAM(s) IV Push every 6 hours PRN for sustain Afib >130  ondansetron Injectable 4 milliGRAM(s) IV Push every 6 hours PRN Nausea and/or Vomiting      ASSESSMENT / PLAN:  ----------------------------------------  62F with a history of hypertension who presented to [facility] with left sided numbness and found to have occlusion of the right posterior cerebral artery. Tenecteplase was administered and the patient was transferred to [facility] for evaluation. The patient underwent mechanical thrombectomy and was monitored in the Neurosurgical Intensive Care Unit. The patient was noted to have atrial fibrillation with initiation of metoprolol and enoxaparin.    Stroke - Status post tenecteplase administration at [facility] and mechanical thrombectomy. Continue on aspirin and atorvastatin. Continue to monitor status with serial neurological examinations. Pending MRI of the brain.    Atrial fibrillation - On diltiazem and metoprolol. On enoxaparin for anticoagulation. Echocardiogram noted preserved left ventricular systolic function with moderately enlarged left atrium and intact intra atrial septum. Possible initiation of oral anticoagulant pending results of the MRI. Repeat CT of the brain was without hemorrhage.    Leukocytosis - Suspect reactive in nature. Afebrile.    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 For further follow up won an outpatient basis. WINNIE PEREZ  ----------------------------------------  The patient was seen at bedside. Patient with stroke. Offered no complaints. Denied weakness or numbness.    Vital Signs Last 24 Hrs  T(C): 36.7 (05 Jul 2023 08:32), Max: 36.8 (04 Jul 2023 17:56)  T(F): 98 (05 Jul 2023 08:32), Max: 98.3 (05 Jul 2023 00:45)  HR: 118 (05 Jul 2023 09:39) (66 - 130)  BP: 124/75 (05 Jul 2023 08:32) (105/72 - 135/79)  BP(mean): --  RR: 18 (05 Jul 2023 08:32) (16 - 20)  SpO2: 99% (05 Jul 2023 08:32) (95% - 100%)    Parameters below as of 05 Jul 2023 08:32  Patient On (Oxygen Delivery Method): room air    PHYSICAL EXAMINATION:  ----------------------------------------  General appearance: No acute distress, Awake, Alert  HEENT: Normocephalic, Atraumatic, Conjunctiva clear, EOMI  Neck: Supple, No JVD, No tenderness  Lungs: Breath sound equal bilaterally, No wheezes, No rales  Cardiovascular: S1S2, Regular rhythm  Abdomen: Soft, Nontender, Nondistended, No guarding/rebound, Positive bowel sounds  Extremities: No clubbing, No cyanosis, No edema, No calf tenderness  Neuro: Strength equal bilaterally, No tremors  Psychiatric: Appropriate mood, Normal affect    LABORATORY STUDIES:  ----------------------------------------             12.9   12.74 )-----------( 217      ( 04 Jul 2023 06:41 )             38.5     07-04    136  |  103  |  22.2<H>  ----------------------------<  91  4.0   |  21.0<L>  |  0.62    Ca    8.7      04 Jul 2023 06:41  Phos  2.2     07-04  Mg     2.1     07-04    Urinalysis Basic - ( 04 Jul 2023 06:41 )  Color: x / Appearance: x / SG: x / pH: x  Gluc: 91 mg/dL / Ketone: x  / Bili: x / Urobili: x   Blood: x / Protein: x / Nitrite: x   Leuk Esterase: x / RBC: x / WBC x   Sq Epi: x / Non Sq Epi: x / Bacteria: x    MEDICATIONS  (STANDING):  ALPRAZolam 0.5 milliGRAM(s) Oral once  aspirin enteric coated 81 milliGRAM(s) Oral daily  atorvastatin 40 milliGRAM(s) Oral at bedtime  chlorhexidine 2% Cloths 1 Application(s) Topical daily  diltiazem    Tablet 60 milliGRAM(s) Oral every 6 hours  enoxaparin Injectable 40 milliGRAM(s) SubCutaneous <User Schedule>  metoprolol tartrate 50 milliGRAM(s) Oral every 8 hours  mupirocin 2% Nasal 1 Application(s) Both Nostrils every 12 hours  polyethylene glycol 3350 17 Gram(s) Oral daily  senna 2 Tablet(s) Oral at bedtime    MEDICATIONS  (PRN):  acetaminophen     Tablet .. 650 milliGRAM(s) Oral every 6 hours PRN Mild Pain (1 - 3), Moderate Pain (4 - 6)  hydrALAZINE Injectable 10 milliGRAM(s) IV Push every 4 hours PRN SBP>180  metoprolol tartrate Injectable 5 milliGRAM(s) IV Push every 6 hours PRN for sustain Afib >130  ondansetron Injectable 4 milliGRAM(s) IV Push every 6 hours PRN Nausea and/or Vomiting      ASSESSMENT / PLAN:  ----------------------------------------  62F with a history of hypertension who presented to [facility] with left sided numbness and found to have occlusion of the right posterior cerebral artery. Tenecteplase was administered and the patient was transferred to [facility] for evaluation. The patient underwent mechanical thrombectomy and was monitored in the Neurosurgical Intensive Care Unit. The patient was noted to have atrial fibrillation with initiation of metoprolol and enoxaparin.    Stroke - Status post tenecteplase administration at [facility] and mechanical thrombectomy. Continue on aspirin and atorvastatin. Continue to monitor status with serial neurological examinations. Pending MRI of the brain.    Atrial fibrillation - On diltiazem and metoprolol. Echocardiogram noted preserved left ventricular systolic function with moderately enlarged left atrium and intact intra atrial septum. Possible initiation of oral anticoagulant pending results of the MRI. Repeat CT of the brain was without hemorrhage.    Leukocytosis - Suspect reactive in nature. Afebrile.    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 For further follow up won an outpatient basis.

## 2023-07-05 NOTE — CONSULT NOTE ADULT - SUBJECTIVE AND OBJECTIVE BOX
[facility]                                                               CARDIAC ELECTROPHYSIOLOGY                                                       [facility] [place]                                                      Office: [address], [place] [address]                                                       Telephone: [phone]. Fax:[phone]    HPI:  Teagan Odell is a 63 year old woman with hypertension, remote hyperthyroidism, family history of CVA (mother & brother) who presented to [facility] with acute left sided numbness, weakness and aphasia found to have acute R PCA stroke s/p TNK + thrombectomy who was noted to have AF for which EP is consulted.    The patient states that she was at home when she developed sudden onset aphasia and some abnormal sensation over her face. She noted that she has been having some dyspnea on exertion and fatigue over the past week. She denies having any fevers, chills, sweats, cough, chest pain/pressure, palpitations, dizziness, near syncope, syncope, nausea, vomiting, diarrhea, peripheral edema, orthopnea, or PND.    She was found to have an acute P1-P2 PCA near occlusion for which she was given TNK followed by transfer to [facility] at which time she had mechanical thrombectomy. In this setting she was found to have AF with RVR.    She is currently doing well and does not have any symptoms of her AF, she also has no apparent residual deficits from her CVA.    EC23: AF with RVR    TELE: Coarse AF with rates ~100s.    Echo:  23 TTE: LVEF: 60-65%. Moderately enlarged LA (LAd 4.45 cm, QUAN 43.7 mL/m2). RA normal. Trivial pericardial effusion. Mild MR. Mild-mod TR.    PAST MEDICAL & SURGICAL HISTORY:  HTN (hypertension)  H/O carpal tunnel repair    REVIEW OF SYSTEMS: As per HPI. Remaining 10 point ROS were reviewed and are negative.    MEDICATIONS  (STANDING):  ALPRAZolam 0.5 milliGRAM(s) Oral once  aspirin enteric coated 81 milliGRAM(s) Oral daily  atorvastatin 40 milliGRAM(s) Oral at bedtime  chlorhexidine 2% Cloths 1 Application(s) Topical daily  diltiazem    Tablet 60 milliGRAM(s) Oral every 6 hours  enoxaparin Injectable 40 milliGRAM(s) SubCutaneous <User Schedule>  metoprolol tartrate 50 milliGRAM(s) Oral every 8 hours  mupirocin 2% Nasal 1 Application(s) Both Nostrils every 12 hours  polyethylene glycol 3350 17 Gram(s) Oral daily  senna 2 Tablet(s) Oral at bedtime    MEDICATIONS  (PRN):  acetaminophen     Tablet .. 650 milliGRAM(s) Oral every 6 hours PRN Mild Pain (1 - 3), Moderate Pain (4 - 6)  hydrALAZINE Injectable 10 milliGRAM(s) IV Push every 4 hours PRN SBP>180  metoprolol tartrate Injectable 5 milliGRAM(s) IV Push every 6 hours PRN for sustain Afib >130  ondansetron Injectable 4 milliGRAM(s) IV Push every 6 hours PRN Nausea and/or Vomiting    Allergies  azithromycin (Hives; Rash)  penicillin (Hives; Rash)    SOCIAL HISTORY:  Denies smoking, EtOH, illicit drug use  Lives by herself in adult community    FAMILY HISTORY:  Family history of stroke or transient ischemic attack in mother (Mother, Sibling)    FH: diabetes mellitus (Mother)    Vital Signs Last 24 Hrs  T(C): 36.7 (2023 08:32), Max: 36.8 (2023 17:56)  T(F): 98 (2023 08:32), Max: 98.3 (2023 00:45)  HR: 118 (2023 09:39) (66 - 130)  BP: 124/75 (2023 08:32) (105/72 - 135/79)  BP(mean): --  RR: 18 (2023 08:32) (16 - 20)  SpO2: 99% (2023 08:32) (95% - 100%)    Parameters below as of 2023 08:32  Patient On (Oxygen Delivery Method): room air    Physical Exam:  Constitutional: Well-developed, well nourished, in no acute distress, obese  Head: normocephalic atraumatic   Eyes:  extraocular movements intact, sclera anicteric  ENT: mucous membranes moist, pharynx no erythema or swelling  Chest wall: normal in appearance, nontender to palpation  Resp: effort normal, breath sounds clear to auscultation bilaterally  Cardiac: Irregularly irregular, no murmurs, rubs, or gallops.  No edema.  Abdomen: soft, nondistended, bowel sounds active, nontender to palpation in all four quadrants    Musculoskeletal: full range of motion all extremities with no pain, tenderness, swelling, or erythema    Neuro: Alert and oriented x 3, motor & sensation grossly in tact  Skin: color normal, no rashes or injury  Psych: mood calm    LABS:                        12.9   12.74 )-----------( 217      ( 2023 06:41 )             38.5   07-04    136  |  103  |  22.2<H>  ----------------------------<  91  4.0   |  21.0<L>  |  0.62    Ca    8.7      2023 06:41  Phos  2.2     07-04  Mg     2.1     07-04      Urinalysis Basic - ( 2023 06:41 )    Color: x / Appearance: x / SG: x / pH: x  Gluc: 91 mg/dL / Ketone: x  / Bili: x / Urobili: x   Blood: x / Protein: x / Nitrite: x   Leuk Esterase: x / RBC: x / WBC x   Sq Epi: x / Non Sq Epi: x / Bacteria: x

## 2023-07-05 NOTE — PROGRESS NOTE ADULT - ASSESSMENT
61 y/o female with hx of HTN, remote hyperthyroid, family hx of CVA (mother, brother) who presented to [facility] with left-sided numbness, weakness and slurred speech. Head CTA revealed right PCA near-complete occlusion at P1/P2 junction. Pt was given tenecteplase and she was then transferred to [facility] for possible intervention. She is now s/p cerebral angiography and suction thrombectomy.   Pt was found to have Afib with RVR on telemetry. She denies any prior hx of Afib but endorses recent feeling of fluttering in her chest. Pt denies chest pain or SOB. EKG at [place] shows Afib with ST depressions and TWI in lateral leads. Pt does not have a cardiologist.

## 2023-07-05 NOTE — PROGRESS NOTE ADULT - PROBLEM SELECTOR PLAN 1
.  - CHADSVASC 3-4   - start DOAC when cleared by neurology. Pending MRI for clearance  -  if stable then start Eliquis 5mg BID  - Continue Lopressor and Cardizem  - currently rate controlled  - TTE 60-65%, no RWMA  - OP ischemic evaluation

## 2023-07-06 PROCEDURE — 70551 MRI BRAIN STEM W/O DYE: CPT | Mod: 26

## 2023-07-06 PROCEDURE — 99233 SBSQ HOSP IP/OBS HIGH 50: CPT

## 2023-07-06 PROCEDURE — 99232 SBSQ HOSP IP/OBS MODERATE 35: CPT

## 2023-07-06 RX ORDER — DILTIAZEM HCL 120 MG
360 CAPSULE, EXT RELEASE 24 HR ORAL DAILY
Refills: 0 | Status: DISCONTINUED | OUTPATIENT
Start: 2023-07-06 | End: 2023-07-12

## 2023-07-06 RX ORDER — ALPRAZOLAM 0.25 MG
0.5 TABLET ORAL ONCE
Refills: 0 | Status: DISCONTINUED | OUTPATIENT
Start: 2023-07-06 | End: 2023-07-06

## 2023-07-06 RX ORDER — METOPROLOL TARTRATE 50 MG
100 TABLET ORAL EVERY 12 HOURS
Refills: 0 | Status: DISCONTINUED | OUTPATIENT
Start: 2023-07-06 | End: 2023-07-12

## 2023-07-06 RX ADMIN — LIDOCAINE 1 PATCH: 4 CREAM TOPICAL at 13:38

## 2023-07-06 RX ADMIN — Medication 0.5 MILLIGRAM(S): at 08:35

## 2023-07-06 RX ADMIN — LIDOCAINE 1 PATCH: 4 CREAM TOPICAL at 07:41

## 2023-07-06 RX ADMIN — Medication 90 MILLIGRAM(S): at 05:48

## 2023-07-06 RX ADMIN — ENOXAPARIN SODIUM 40 MILLIGRAM(S): 100 INJECTION SUBCUTANEOUS at 21:06

## 2023-07-06 RX ADMIN — ATORVASTATIN CALCIUM 40 MILLIGRAM(S): 80 TABLET, FILM COATED ORAL at 21:05

## 2023-07-06 RX ADMIN — Medication 360 MILLIGRAM(S): at 18:05

## 2023-07-06 RX ADMIN — MUPIROCIN 1 APPLICATION(S): 20 OINTMENT TOPICAL at 18:05

## 2023-07-06 RX ADMIN — Medication 100 MILLIGRAM(S): at 18:04

## 2023-07-06 RX ADMIN — Medication 81 MILLIGRAM(S): at 13:35

## 2023-07-06 RX ADMIN — MUPIROCIN 1 APPLICATION(S): 20 OINTMENT TOPICAL at 05:48

## 2023-07-06 RX ADMIN — CHLORHEXIDINE GLUCONATE 1 APPLICATION(S): 213 SOLUTION TOPICAL at 05:44

## 2023-07-06 RX ADMIN — POLYETHYLENE GLYCOL 3350 17 GRAM(S): 17 POWDER, FOR SOLUTION ORAL at 13:35

## 2023-07-06 RX ADMIN — Medication 50 MILLIGRAM(S): at 05:48

## 2023-07-06 NOTE — PROGRESS NOTE ADULT - PROBLEM SELECTOR PLAN 1
.  - CHADSVASC 3-4   - MRI performed today, with petechial hemorrhage  -  if ok with Neuro, then start Eliquis 5mg BID. Neuro to follow up today  - changed diltiazem to 360MG, change metoprolol to 100mg PO BID  - currently rate controlled  - TTE 60-65%, no RWMA  - OP ischemic evaluation  - OP follow up with EP for rhythm management

## 2023-07-06 NOTE — PROGRESS NOTE ADULT - SUBJECTIVE AND OBJECTIVE BOX
Preliminary note, offical recommendations pending attending review/signature   [facility] Stroke Team  Progress Note     HPI:  This is a 62y Female HTN  last seen well at 1200 noon 7/2/23 . Developed Left sided numbness weakness. CT CTA CTP revealed L P!- P2 occlusion with no core and 40 ml penumbra. Patient was given tenecteplase at [facility] and transferred to [facility] for  thrombectomy and further care.  NIH 2 ICH 0 MRS 1    SUBJECTIVE: No events overnight.  No new neurologic complaints.  ROS reported negative unless otherwise noted. Left middle finger tingling since last few days.     acetaminophen     Tablet .. 650 milliGRAM(s) Oral every 6 hours PRN  aspirin enteric coated 81 milliGRAM(s) Oral daily  atorvastatin 40 milliGRAM(s) Oral at bedtime  chlorhexidine 2% Cloths 1 Application(s) Topical daily  diltiazem    milliGRAM(s) Oral daily  enoxaparin Injectable 40 milliGRAM(s) SubCutaneous <User Schedule>  hydrALAZINE Injectable 10 milliGRAM(s) IV Push every 4 hours PRN  metoprolol tartrate 100 milliGRAM(s) Oral every 12 hours  metoprolol tartrate Injectable 5 milliGRAM(s) IV Push every 6 hours PRN  mupirocin 2% Nasal 1 Application(s) Both Nostrils every 12 hours  ondansetron Injectable 4 milliGRAM(s) IV Push every 6 hours PRN  polyethylene glycol 3350 17 Gram(s) Oral daily  senna 2 Tablet(s) Oral at bedtime      PHYSICAL EXAM:   Vital Signs Last 24 Hrs  T(C): 36.9 (06 Jul 2023 08:00), Max: 36.9 (06 Jul 2023 08:00)  T(F): 98.4 (06 Jul 2023 08:00), Max: 98.4 (06 Jul 2023 08:00)  HR: 110 (06 Jul 2023 12:00) (76 - 126)  BP: 114/76 (06 Jul 2023 12:00) (111/71 - 134/77)  BP(mean): --  RR: 18 (06 Jul 2023 12:00) (16 - 18)  SpO2: 97% (06 Jul 2023 12:00) (94% - 99%)    Parameters below as of 06 Jul 2023 12:00  Patient On (Oxygen Delivery Method): room air           General: No acute distress    NEUROLOGICAL EXAM:  Mental status: Awake, alert, oriented x3, speech fluent, follows commands, no neglect, normal memory   Cranial Nerves: No facial asymmetry, no nystagmus, no dysarthria,  tongue midline  Motor exam: Normal tone, no drift, 5/5 RUE, 5/5 RLE, 5/5 LUE, 5/5 LLE, normal fine finger movements.  Sensation: Intact to light touch   Coordination/ Gait: No dysmetria, gait not tested    LABS:     IMAGING: Reviewed by me.     MR Head No Cont (07.06.23 @ 09:56)   Small regions of restricted diffusion in the right mesial frontal lobe,   right thalamus, and right superior cerebellar hemisphere, consistent with   acute right PCA territory infarcts.    A few punctate foci of restricted diffusion in the left temporal lobe,   consistent with tiny acute left PCA territory infarcts.    Petechial hemorrhagic transformation involving the right mesial temporal   infarct.      CT Head No Cont (07.02.23 @ 14:07)   New focus of low density in the right superior cerebellar hemisphere   suggesting the presence of acute infarction (2-15). No david hemorrhagic   transformation.    CT head 7/1/23 12:49  IMPRESSION:  No acute intracranial bleeding.    7/2/23 13: 01  CT PERFUSION: Moderate sized right PCA ischemic penumbra involving the right  occipital lobe and posterior right mesial temporal lobe measuring  approximately 40 cc. No significant core infarction.  CTA BRAIN: *Near-complete occlusion of the right PCA at the P1/P2 junction,  corresponding to ischemic penumbra on concurrent perfusion..  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 No  associated calcification or thrombus.  CTA NECK: Patent cervical vasculature. No flow limiting stenosis or  occlusion.      TTE Echo Complete w/o Contrast w/ Doppler (07.03.23 @ 16:27)   Summary:   1. Technically difficult study.   2. Endocardial visualization was enhanced with intravenous echo contrast.   3. Normal left ventricular internal cavity size.   4. Normal global left ventricular systolic function.   5. Left ventricular ejection fraction, by visual estimation, is 60 to   65%.   6. Moderately enlarged left atrium.   7. Normal right atrial size.   8. Sclerotic aortic valve with normal opening.   9. Mild thickening and calcification of the anterior and posterior   mitral valve leaflets.  10. Mild mitral valve regurgitation.  11. Mild to moderate mitral annular calcification.  12. Mild-moderate tricuspid regurgitation.  13. Color flow doppler and intravenous injection of agitated saline is   limited due the study quality.  14. Trivial pericardial effusion.         [facility] Stroke Team  Progress Note     HPI:  This is a 62y Female HTN  last seen well at 1200 noon 7/2/23 . Developed Left sided numbness weakness. CT CTA CTP revealed L P!- P2 occlusion with no core and 40 ml penumbra. Patient was given tenecteplase at [facility] and transferred to [facility] for  thrombectomy and further care.  NIH 2 ICH 0 MRS 1    SUBJECTIVE: No events overnight.  No new neurologic complaints.  ROS reported negative unless otherwise noted. Left middle finger tingling since last few days.     acetaminophen     Tablet .. 650 milliGRAM(s) Oral every 6 hours PRN  aspirin enteric coated 81 milliGRAM(s) Oral daily  atorvastatin 40 milliGRAM(s) Oral at bedtime  chlorhexidine 2% Cloths 1 Application(s) Topical daily  diltiazem    milliGRAM(s) Oral daily  enoxaparin Injectable 40 milliGRAM(s) SubCutaneous <User Schedule>  hydrALAZINE Injectable 10 milliGRAM(s) IV Push every 4 hours PRN  metoprolol tartrate 100 milliGRAM(s) Oral every 12 hours  metoprolol tartrate Injectable 5 milliGRAM(s) IV Push every 6 hours PRN  mupirocin 2% Nasal 1 Application(s) Both Nostrils every 12 hours  ondansetron Injectable 4 milliGRAM(s) IV Push every 6 hours PRN  polyethylene glycol 3350 17 Gram(s) Oral daily  senna 2 Tablet(s) Oral at bedtime      PHYSICAL EXAM:   Vital Signs Last 24 Hrs  T(C): 36.9 (06 Jul 2023 08:00), Max: 36.9 (06 Jul 2023 08:00)  T(F): 98.4 (06 Jul 2023 08:00), Max: 98.4 (06 Jul 2023 08:00)  HR: 110 (06 Jul 2023 12:00) (76 - 126)  BP: 114/76 (06 Jul 2023 12:00) (111/71 - 134/77)  BP(mean): --  RR: 18 (06 Jul 2023 12:00) (16 - 18)  SpO2: 97% (06 Jul 2023 12:00) (94% - 99%)    Parameters below as of 06 Jul 2023 12:00  Patient On (Oxygen Delivery Method): room air     General: No acute distress    NEUROLOGICAL EXAM:  Mental status: Awake, alert, oriented x3, speech fluent, follows commands, no neglect, normal memory   Cranial Nerves: No facial asymmetry, no nystagmus, no dysarthria,  tongue midline  Motor exam: Normal tone, no drift, 5/5 RUE, 5/5 RLE, 5/5 LUE, 5/5 LLE, normal fine finger movements.  Sensation: Intact to light touch   Coordination/ Gait: No dysmetria, gait not tested    LABS:     IMAGING: Reviewed by me.     MR Head No Cont (07.06.23 @ 09:56)   Small regions of restricted diffusion in the right mesial frontal lobe,   right thalamus, and right superior cerebellar hemisphere, consistent with   acute right PCA territory infarcts.    A few punctate foci of restricted diffusion in the left temporal lobe,   consistent with tiny acute left PCA territory infarcts.    Petechial hemorrhagic transformation involving the right mesial temporal   infarct.      CT Head No Cont (07.02.23 @ 14:07)   New focus of low density in the right superior cerebellar hemisphere   suggesting the presence of acute infarction (2-15). No david hemorrhagic   transformation.    CT head 7/1/23 12:49  IMPRESSION:  No acute intracranial bleeding.    7/2/23 13: 01  CT PERFUSION: Moderate sized right PCA ischemic penumbra involving the right  occipital lobe and posterior right mesial temporal lobe measuring  approximately 40 cc. No significant core infarction.  CTA BRAIN: *Near-complete occlusion of the right PCA at the P1/P2 junction,  corresponding to ischemic penumbra on concurrent perfusion..  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 No  associated calcification or thrombus.  CTA NECK: Patent cervical vasculature. No flow limiting stenosis or  occlusion.      TTE Echo Complete w/o Contrast w/ Doppler (07.03.23 @ 16:27)   Summary:   1. Technically difficult study.   2. Endocardial visualization was enhanced with intravenous echo contrast.   3. Normal left ventricular internal cavity size.   4. Normal global left ventricular systolic function.   5. Left ventricular ejection fraction, by visual estimation, is 60 to   65%.   6. Moderately enlarged left atrium.   7. Normal right atrial size.   8. Sclerotic aortic valve with normal opening.   9. Mild thickening and calcification of the anterior and posterior   mitral valve leaflets.  10. Mild mitral valve regurgitation.  11. Mild to moderate mitral annular calcification.  12. Mild-moderate tricuspid regurgitation.  13. Color flow doppler and intravenous injection of agitated saline is   limited due the study quality.  14. Trivial pericardial effusion.

## 2023-07-06 NOTE — PROGRESS NOTE ADULT - ASSESSMENT
63 y/o female with hx of HTN, remote hyperthyroid, family hx of CVA (mother, brother) who presented to [facility] with left-sided numbness, weakness and slurred speech. Head CTA revealed right PCA near-complete occlusion at P1/P2 junction. Pt was given tenecteplase and she was then transferred to [facility] for possible intervention. She is now s/p cerebral angiography and suction thrombectomy.   Pt was found to have Afib with RVR on telemetry. She denies any prior hx of Afib but endorses recent feeling of fluttering in her chest. Pt denies chest pain or SOB. EKG at [place] shows Afib with ST depressions and TWI in lateral leads. Pt does not have a cardiologist.

## 2023-07-06 NOTE — PROGRESS NOTE ADULT - PROBLEM SELECTOR PLAN 2
Likely cardioembolic etiology  S/p TNK and thrombectomy  ASA/statin - continued  Management as per neurology.
.  - Likely cardioembolic etiology given silent Afib  - S/p TNK and thrombectomy  - ASA/statin - continued  - awaiting Neuro clearance for DOAC
.  - Likely cardioembolic etiology given silent Afib  - S/p TNK and thrombectomy  - continue ASA/statin  - awaiting Neuro clearance for DOAC
Likely cardioembolic etiology  S/p TNK and thrombectomy  ASA/statin - continued  Management as per neurology.

## 2023-07-06 NOTE — PROGRESS NOTE ADULT - SUBJECTIVE AND OBJECTIVE BOX
[facility]                                                         CARDIOLOGY AT [facility]                                                                  [address] [facility], [facility] [place] [address]                                                         Telephone: [phone]. Fax:[phone]                                                                             PROGRESS NOTE    Reason for follow up: CVA/ Afib  Update: remains in Afib mostly rate controlled. MRI done today, awaiting neuro input to start DOAC      Review of symptoms:   Cardiac:  No chest pain. No dyspnea. No palpitations.  Respiratory: no cough. No dyspnea  Gastrointestinal: No diarrhea. No abdominal pain. No bleeding.   Neuro: No focal neuro complaints.    Vitals:  T(C): 36.9 (07-06-23 @ 08:00), Max: 36.9 (07-05-23 @ 12:00)  HR: 76 (07-06-23 @ 08:00) (76 - 126)  BP: 134/77 (07-06-23 @ 08:00) (111/71 - 134/77)  RR: 17 (07-06-23 @ 08:00) (16 - 18)  SpO2: 96% (07-06-23 @ 08:00) (94% - 99%)  Wt(kg): --  I&O's Summary    05 Jul 2023 07:01  -  06 Jul 2023 07:00  --------------------------------------------------------  IN: 720 mL / OUT: 0 mL / NET: 720 mL      Weight (kg): 100.8 (07-01 @ 17:52)    PHYSICAL EXAM:  Appearance: Comfortable. No acute distress  HEENT:  Atraumatic. Normocephalic.  Normal oral mucosa  Neurologic: A & O x 3, no gross focal deficits.  Cardiovascular: irreg irregular S1 S2, No murmur, no rubs/gallops. No JVD  Respiratory: Lungs clear to auscultation, unlabored   Gastrointestinal:  Soft, Non-tender, + BS  Lower Extremities: 2+ Peripheral Pulses, No clubbing, cyanosis, or edema  Psychiatry: Patient is calm. No agitation.   Skin: warm and dry.    CURRENT CARDIAC MEDICATIONS:  diltiazem    Tablet 90 milliGRAM(s) Oral every 6 hours  hydrALAZINE Injectable 10 milliGRAM(s) IV Push every 4 hours PRN  metoprolol tartrate 50 milliGRAM(s) Oral every 8 hours  metoprolol tartrate Injectable 5 milliGRAM(s) IV Push every 6 hours PRN      CURRENT OTHER MEDICATIONS:  acetaminophen     Tablet .. 650 milliGRAM(s) Oral every 6 hours PRN Mild Pain (1 - 3), Moderate Pain (4 - 6)  ondansetron Injectable 4 milliGRAM(s) IV Push every 6 hours PRN Nausea and/or Vomiting  polyethylene glycol 3350 17 Gram(s) Oral daily  senna 2 Tablet(s) Oral at bedtime  atorvastatin 40 milliGRAM(s) Oral at bedtime  aspirin enteric coated 81 milliGRAM(s) Oral daily  chlorhexidine 2% Cloths 1 Application(s) Topical daily  enoxaparin Injectable 40 milliGRAM(s) SubCutaneous <User Schedule>  mupirocin 2% Nasal 1 Application(s) Both Nostrils every 12 hours, Stop order after: 5 Days      LABS:	 	  ( 01 Jul 2023 18:50 )  Troponin T  <0.01,  CPK  X    , CKMB  X    , BNP X                      Lipid Profile: Date: 07-02 @ 03:50  Total cholesterol 146; Direct LDL: --; HDL: 43; Triglycerides:58    HgA1c:   TSH: Thyroid Stimulating Hormone, Serum: 0.58 uIU/mL      TELEMETRY: Afib   ECG:    DIAGNOSTIC TESTING:  [ ] Echocardiogram:   < from: TTE Echo Complete w/o Contrast w/ Doppler (07.03.23 @ 16:27) >  PHYSICIAN INTERPRETATION:  Left Ventricle: Endocardial visualization was enhanced with intravenous   echo contrast. The left ventricular internal cavity sizeis normal.  Global LV systolic function was normal. Left ventricular ejection   fraction, by visual estimation, is 60 to 65%.  Right Ventricle: The right ventricle was not well visualized.  Left Atrium: Moderately enlarged left atrium. Color flow doppler and   intravenous injection of agitated saline demonstrates the presence of an   intact intra atrial septum.  Right Atrium: Normal right atrial size.  Pericardium: Trivial pericardial effusion is present. The pericardial   effusion is posterior to the left ventricle.  Mitral Valve: Mild thickening and calcification of the anterior and   posterior mitral valve leaflets. There is mild to moderate mitral annular   calcification. Mild mitral valve regurgitation is seen.  Tricuspid Valve: The tricuspid valve is not well seen. Mild-moderate   tricuspid regurgitation is visualized.  Aortic Valve: The aortic valve was not well visualized. Sclerotic aortic   valve with normal opening. Trivial aortic valve regurgitation is seen.  Pulmonic Valve: The pulmonic valve was not well visualized. Trace   pulmonic valve regurgitation.  Aorta: The aortic root is normal in size and structure.  Pulmonary Artery: The pulmonary artery is of normal size and origin.  Venous: The pulmonary veins were not well visualized. The inferior vena   cava was normal sized, with respiratory size variation less than 50%.  Shunts: Agitated saline contrast was given intravenously to evaluate for   intracardiac shunting.      Summary:   1. Technically difficult study.   2. Endocardial visualization was enhanced with intravenous echo contrast.   3. Normal left ventricular internal cavity size.   4. Normal global left ventricular systolic function.   5. Left ventricular ejection fraction, by visual estimation, is 60 to   65%.   6. Moderately enlarged left atrium.   7. Normal right atrial size.   8. Sclerotic aortic valve with normal opening.   9. Mild thickening and calcification of the anterior and posterior   mitral valve leaflets.  10. Mild mitral valve regurgitation.  11. Mild to moderate mitral annular calcification.  12. Mild-moderate tricuspid regurgitation.  13. Color flow doppler and intravenous injection of agitated saline is   limited due the study quality.  14. Trivial pericardial effusion.    MD Colton Electronically signed on 7/4/2023 at 1:19:14 PM    < end of copied text >  [ ]  Catheterization:  [ ] Stress Test:    OTHER:

## 2023-07-06 NOTE — PROGRESS NOTE ADULT - ASSESSMENT
ASSESSMENT: This is 63 y/o female with hx of HTN, remote hyperthyroid, family hx of CVA (mother, brother) who presented to [facility] with left-sided numbness, weakness and slurred speech on the evening of 7/1/23.  CT head without acute infarction or hemorrhage, CT Angio revealed right PCA near-complete occlusion at P1/P2 junction. Pt was given tenecteplase and she was then transferred to [facility] where she underwent mechanical thrombectomy with TICI 3 recanalization.   Pt was found to have Afib with RVR on telemetry. Repeat CT head with hypodensity in right superior cerebellar region concerning for acute infarction. 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 Etiology likely cardioembolic in setting of atrial fibrillation.     NEURO: neurologically overall improving, Continue close monitoring for neurologic deterioration , stroke neuro checks q4 hr,  Follow up with Dr. Zhou re: incidental aneurysm upon discharge and post stroke follow up,   SBP goal 110-130mmHg being neurologically tolerated at this time avoid rapid fluctuations and hypotension, titrate statin to LDL goal less than 70, MRI Brain w/o as noted , Physical therapy/OT/Speech eval/treatment.     ANTITHROMBOTIC THERAPY: ASA 81mg daily for now,  due to petechial hemorrhagic transformation of stroke will follow clinical course and follow up neurological imaging (CT head) on 7/9/23 will determine timeline for initiation of anticoagulation in setting of atrial fibrillation for secondary stroke prevention. Risks/benefits/ alternatives discussed with patient who verbalizes, expresses understanding.     PULMONARY: protecting airway, saturating well     CARDIOVASCULAR:  TTE: as noted, cardiac monitoring to ensure rate control given recent RVR , cardiology follow up appreciated                           GASTROINTESTINAL:  dysphagia screen  passed, advance as tolerated per protocol      RENAL: BUN slightly elevated/Cr within range, monitor urine output, maintain adequate hydration       Na Goal:  135-145     Emery:    HEMATOLOGY: H/H without anemia, Platelets 217, patient should have all age and risk appropriate malignancy screenings with PCP or sooner if clinically suspected      DVT ppx: Heparin s.c [] LMWH [x]     ID: afebrile,  leukocytosis, monitor for si/sx of infection     OTHER:  condition and plan of care d/w patient, questions and concerns addressed.     DISPOSITION: Rehab or home depending on PT eval once stable and workup is complete      CORE MEASURES:        Admission NIHSS:  2     Tenecteplase : [x] YES [] NO      LDL/HDL/A1C: 91/43/5.0     Depression Screen- if depression hx and/or present      Statin Therapy:  as noted      Dysphagia Screen: [x] PASS [] FAIL     Smoking [] YES [x] NO      Afib [x] YES [] NO     Stroke Education [x] YES [] NO    Obtain screening lower extremity venous ultrasound in patients who meet 1 or more of the following criteria as patient is high risk for DVT/PE on admission:   [] History of DVT/PE  []Hypercoagulable states (Factor V Leiden, Cancer, OCP, etc. )  []Prolonged immobility (hemiplegia/hemiparesis/post operative or any other extended immobilization)  [] Transferred from outside facility (Rehab or Long term care)  [] Age </= to 50

## 2023-07-07 ENCOUNTER — APPOINTMENT (OUTPATIENT)
Dept: FAMILY MEDICINE | Facility: CLINIC | Age: 63
End: 2023-07-07

## 2023-07-07 LAB
ANION GAP SERPL CALC-SCNC: 13 MMOL/L — SIGNIFICANT CHANGE UP (ref 5–17)
BUN SERPL-MCNC: 20.9 MG/DL — HIGH (ref 8–20)
CALCIUM SERPL-MCNC: 8.7 MG/DL — SIGNIFICANT CHANGE UP (ref 8.4–10.5)
CHLORIDE SERPL-SCNC: 101 MMOL/L — SIGNIFICANT CHANGE UP (ref 96–108)
CO2 SERPL-SCNC: 23 MMOL/L — SIGNIFICANT CHANGE UP (ref 22–29)
CREAT SERPL-MCNC: 0.72 MG/DL — SIGNIFICANT CHANGE UP (ref 0.5–1.3)
EGFR: 93 ML/MIN/1.73M2 — SIGNIFICANT CHANGE UP
GLUCOSE SERPL-MCNC: 92 MG/DL — SIGNIFICANT CHANGE UP (ref 70–99)
HCT VFR BLD CALC: 40.4 % — SIGNIFICANT CHANGE UP (ref 34.5–45)
HGB BLD-MCNC: 13.3 G/DL — SIGNIFICANT CHANGE UP (ref 11.5–15.5)
MCHC RBC-ENTMCNC: 28.5 PG — SIGNIFICANT CHANGE UP (ref 27–34)
MCHC RBC-ENTMCNC: 32.9 GM/DL — SIGNIFICANT CHANGE UP (ref 32–36)
MCV RBC AUTO: 86.7 FL — SIGNIFICANT CHANGE UP (ref 80–100)
PLATELET # BLD AUTO: 268 K/UL — SIGNIFICANT CHANGE UP (ref 150–400)
POTASSIUM SERPL-MCNC: 4.3 MMOL/L — SIGNIFICANT CHANGE UP (ref 3.5–5.3)
POTASSIUM SERPL-SCNC: 4.3 MMOL/L — SIGNIFICANT CHANGE UP (ref 3.5–5.3)
RBC # BLD: 4.66 M/UL — SIGNIFICANT CHANGE UP (ref 3.8–5.2)
RBC # FLD: 13.6 % — SIGNIFICANT CHANGE UP (ref 10.3–14.5)
SODIUM SERPL-SCNC: 137 MMOL/L — SIGNIFICANT CHANGE UP (ref 135–145)
WBC # BLD: 11.31 K/UL — HIGH (ref 3.8–10.5)
WBC # FLD AUTO: 11.31 K/UL — HIGH (ref 3.8–10.5)

## 2023-07-07 PROCEDURE — 99233 SBSQ HOSP IP/OBS HIGH 50: CPT

## 2023-07-07 PROCEDURE — 99232 SBSQ HOSP IP/OBS MODERATE 35: CPT

## 2023-07-07 RX ORDER — GUAIFENESIN/DEXTROMETHORPHAN 600MG-30MG
10 TABLET, EXTENDED RELEASE 12 HR ORAL ONCE
Refills: 0 | Status: COMPLETED | OUTPATIENT
Start: 2023-07-07 | End: 2023-07-07

## 2023-07-07 RX ADMIN — Medication 81 MILLIGRAM(S): at 12:14

## 2023-07-07 RX ADMIN — Medication 100 MILLIGRAM(S): at 17:36

## 2023-07-07 RX ADMIN — ATORVASTATIN CALCIUM 40 MILLIGRAM(S): 80 TABLET, FILM COATED ORAL at 21:08

## 2023-07-07 RX ADMIN — POLYETHYLENE GLYCOL 3350 17 GRAM(S): 17 POWDER, FOR SOLUTION ORAL at 12:14

## 2023-07-07 RX ADMIN — ENOXAPARIN SODIUM 40 MILLIGRAM(S): 100 INJECTION SUBCUTANEOUS at 21:08

## 2023-07-07 RX ADMIN — Medication 100 MILLIGRAM(S): at 06:08

## 2023-07-07 RX ADMIN — MUPIROCIN 1 APPLICATION(S): 20 OINTMENT TOPICAL at 06:09

## 2023-07-07 RX ADMIN — CHLORHEXIDINE GLUCONATE 1 APPLICATION(S): 213 SOLUTION TOPICAL at 06:03

## 2023-07-07 RX ADMIN — Medication 10 MILLILITER(S): at 22:16

## 2023-07-07 RX ADMIN — Medication 360 MILLIGRAM(S): at 06:08

## 2023-07-07 NOTE — PROGRESS NOTE ADULT - ASSESSMENT
62F history significant for obesity (BMI 34), HTN found with acute CVA (R-PCA) s/p TNK and thrombectomy and noted to be in Afib RVR, asymptomatic likely silent Afib for few months    -continue rate control changed to metoprolol 100mg BID and diltiazem 360mg, monitor if any longer duration of pauses >5 seconds.   -TTE LV EF 60-65% and moderate LA enlargement   - brain MRI with petechial hemorrhage and neurology recommend repeat CT head on 7/9 before decision if can start Eliquis 5mg BID, CHADSVasc 3-4, on ASA 81mg   -defer rhythm control until able to tolerate long term AC, outpatient MIRANDA/CV and consider future elective ablation, seen by EP/Dr. Wiggins  -outpatient sleep study       Elliot Mishra DO, [facility]  Faculty Non-Invasive Cardiologist  [phone].

## 2023-07-07 NOTE — PROGRESS NOTE ADULT - SUBJECTIVE AND OBJECTIVE BOX
[place] [facility]                                                               Office: [address] [address]                                                              Telephone: [phone]. Fax:[phone]                                                                             PROGRESS NOTE  Reason for follow up: Persistent Afib, Embolic CVA  Overnight: No new events.   Update: denies complains, Afib rate controlled, short pause <3 seconds      Review of symptoms:   Cardiac:  No chest pain. No dyspnea. No palpitations.  Respiratory: No cough. No dyspnea  Gastrointestinal: No diarrhea. No abdominal pain. No bleeding.     Past medical history: No updates.   	  Vital Signs Last 24 Hrs  T(C): 36.5 (07-07-23 @ 16:47), Max: 37 (07-06-23 @ 21:00)  T(F): 97.7 (07-07-23 @ 16:47), Max: 98.6 (07-06-23 @ 21:00)  HR: 68 (07-07-23 @ 16:47) (68 - 115)  BP: 130/75 (07-07-23 @ 16:47) (107/68 - 130/75)  BP(mean): --  RR: 18 (07-07-23 @ 16:47) (16 - 18)  SpO2: 98% (07-07-23 @ 16:47) (93% - 100%)  I&O's Summary    06 Jul 2023 07:01  -  07 Jul 2023 07:00  --------------------------------------------------------  IN: 720 mL / OUT: 0 mL / NET: 720 mL    07 Jul 2023 07:01  -  07 Jul 2023 17:33  --------------------------------------------------------  IN: 480 mL / OUT: 3 mL / NET: 477 mL          PHYSICAL EXAM:  Appearance: Comfortable. No acute distress  HEENT:  Head and neck: Atraumatic. Normocephalic.  Normal oral mucosa, PERRL, Neck is supple. No JVD, No carotid bruit.   Neurologic: A&Ox 3, no focal deficits. EOMI, Cranial nerves are intact.  Lymphatic: No cervical lymphadenopathy  Cardiovascular: Normal S1 S2, No murmur, rubs/gallops. No JVD, No edema  Respiratory: Lungs clear to auscultation  Gastrointestinal:  Soft, Non-tender, + BS  Lower Extremities: No edema  Psychiatry: Patient is calm. No agitation. Mood & affect appropriate  Skin: No rashes/ecchymoses/cyanosis/ulcers visualized on the face, hands or feet.      CURRENT MEDICATIONS:  MEDICATIONS  (STANDING):  aspirin enteric coated 81 milliGRAM(s) Oral daily  atorvastatin 40 milliGRAM(s) Oral at bedtime  chlorhexidine 2% Cloths 1 Application(s) Topical daily  diltiazem    milliGRAM(s) Oral daily  enoxaparin Injectable 40 milliGRAM(s) SubCutaneous <User Schedule>  metoprolol tartrate 100 milliGRAM(s) Oral every 12 hours  polyethylene glycol 3350 17 Gram(s) Oral daily  senna 2 Tablet(s) Oral at bedtime    MEDICATIONS  (PRN):  acetaminophen     Tablet .. 650 milliGRAM(s) Oral every 6 hours PRN Mild Pain (1 - 3), Moderate Pain (4 - 6)  hydrALAZINE Injectable 10 milliGRAM(s) IV Push every 4 hours PRN SBP>180  metoprolol tartrate Injectable 5 milliGRAM(s) IV Push every 6 hours PRN for sustain Afib >130  ondansetron Injectable 4 milliGRAM(s) IV Push every 6 hours PRN Nausea and/or Vomiting      DIAGNOSTIC TESTING:  [ ] Echocardiogram:   < from: TTE Echo Complete w/o Contrast w/ Doppler (07.03.23 @ 16:27) >    Summary:   1. Technically difficult study.   2. Endocardial visualization was enhanced with intravenous echo contrast.   3. Normal left ventricular internal cavity size.   4. Normal global left ventricular systolic function.   5. Left ventricular ejection fraction, by visual estimation, is 60 to   65%.   6. Moderately enlarged left atrium.   7. Normal right atrial size.   8. Sclerotic aortic valve with normal opening.   9. Mild thickening and calcification of the anterior and posterior   mitral valve leaflets.  10. Mild mitral valve regurgitation.  11. Mild to moderate mitral annular calcification.  12. Mild-moderate tricuspid regurgitation.  13. Color flow doppler and intravenous injection of agitated saline is   limited due the study quality.  14. Trivial pericardial effusion.    < end of copied text >    [ ]  Catheterization:  [ ] Stress Test:      Labs:                        13.3   11.31 )-----------( 268      ( 07 Jul 2023 05:55 )             40.4     07-07    137  |  101  |  20.9<H>  ----------------------------<  92  4.3   |  23.0  |  0.72    Ca    8.7      07 Jul 2023 05:55       01 Jul 2023 18:50 Troponin <0.01 ng/mL / Creatine Kinase x     /  CKMB x     / CPK Mass Assay % x            Cholesterol 146 mg/dL; Direct LDL --; HDL Cholesterol, Serum 43 mg/dL; HDL/ Total Cholesterol Ratio Measurement --; Total Cholesterol/ HDL Ratio Measurement --; Triglycerides, Serum 58 mg/dL  A1C with Estimated Average Glucose Result: 5.0 % (07-02-23 @ 03:50)      TELEMETRY: Afib rate controlled 50-80s, short pauses <3 seconds

## 2023-07-07 NOTE — PROGRESS NOTE ADULT - SUBJECTIVE AND OBJECTIVE BOX
WINNIE PEREZ  ----------------------------------------  The patient was seen at bedside. Patient with stroke. Offered no complaints. Denied headache or lightheadedness.    Vital Signs Last 24 Hrs  T(C): 36.4 (07 Jul 2023 08:00), Max: 37 (06 Jul 2023 21:00)  T(F): 97.5 (07 Jul 2023 08:00), Max: 98.6 (06 Jul 2023 21:00)  HR: 98 (07 Jul 2023 08:00) (85 - 115)  BP: 128/85 (07 Jul 2023 08:00) (107/68 - 128/85)  BP(mean): --  RR: 16 (07 Jul 2023 06:02) (16 - 18)  SpO2: 98% (07 Jul 2023 08:00) (93% - 100%)    Parameters below as of 07 Jul 2023 08:00  Patient On (Oxygen Delivery Method): room air    PHYSICAL EXAMINATION:  ----------------------------------------  General appearance: No acute distress, Awake, Alert  HEENT: Normocephalic, Atraumatic, Conjunctiva clear, EOMI  Neck: Supple, No JVD, No tenderness  Lungs: Breath sound equal bilaterally, No wheezes, No rales  Cardiovascular: S1S2, Regular rhythm  Abdomen: Soft, Nontender, Nondistended, No guarding/rebound, Positive bowel sounds  Extremities: No clubbing, No cyanosis, No edema, No calf tenderness  Neuro: Strength equal bilaterally, No tremors  Psychiatric: Appropriate mood, Normal affect    LABORATORY STUDIES:  ----------------------------------------             13.3   11.31 )-----------( 268      ( 07 Jul 2023 05:55 )             40.4     07-07    137  |  101  |  20.9<H>  ----------------------------<  92  4.3   |  23.0  |  0.72    Ca    8.7      07 Jul 2023 05:55    Urinalysis Basic - ( 07 Jul 2023 05:55 )  Color: x / Appearance: x / SG: x / pH: x  Gluc: 92 mg/dL / Ketone: x  / Bili: x / Urobili: x   Blood: x / Protein: x / Nitrite: x   Leuk Esterase: x / RBC: x / WBC x   Sq Epi: x / Non Sq Epi: x / Bacteria: x    MEDICATIONS  (STANDING):  aspirin enteric coated 81 milliGRAM(s) Oral daily  atorvastatin 40 milliGRAM(s) Oral at bedtime  chlorhexidine 2% Cloths 1 Application(s) Topical daily  diltiazem    milliGRAM(s) Oral daily  enoxaparin Injectable 40 milliGRAM(s) SubCutaneous <User Schedule>  metoprolol tartrate 100 milliGRAM(s) Oral every 12 hours  polyethylene glycol 3350 17 Gram(s) Oral daily  senna 2 Tablet(s) Oral at bedtime    MEDICATIONS  (PRN):  acetaminophen     Tablet .. 650 milliGRAM(s) Oral every 6 hours PRN Mild Pain (1 - 3), Moderate Pain (4 - 6)  hydrALAZINE Injectable 10 milliGRAM(s) IV Push every 4 hours PRN SBP>180  metoprolol tartrate Injectable 5 milliGRAM(s) IV Push every 6 hours PRN for sustain Afib >130  ondansetron Injectable 4 milliGRAM(s) IV Push every 6 hours PRN Nausea and/or Vomiting      ASSESSMENT / PLAN:  ----------------------------------------  62F with a history of hypertension who presented to [facility] with left sided numbness and found to have occlusion of the right posterior cerebral artery. Tenecteplase was administered and the patient was transferred to [facility] for evaluation. The patient underwent mechanical thrombectomy and was monitored in the Neurosurgical Intensive Care Unit. The patient was noted to have atrial fibrillation with initiation of metoprolol and enoxaparin.    Stroke - Status post tenecteplase administration at [facility] and then mechanical thrombectomy. Continue on aspirin and atorvastatin. Continue to monitor status with serial neurological examinations. MRI of the brain noted acute infarcts and petechial hemorrhagic transformation in the right temporal infarct.    Atrial fibrillation - On diltiazem and metoprolol. Echocardiogram noted preserved left ventricular systolic function with moderately enlarged left atrium and intact intra atrial septum. Neurology follow up noted, for repeat CT on 7/9 to determine when anticoagulation can be safely initiated.    Leukocytosis - Suspect reactive in nature. Afebrile.    Cerebral aneurysm - 4mm saccular aneurysm discussed with the patient. For further follow up won an outpatient basis.    Discussed with the patient and her daughter on the patient's phone.

## 2023-07-08 LAB
ANION GAP SERPL CALC-SCNC: 11 MMOL/L — SIGNIFICANT CHANGE UP (ref 5–17)
BUN SERPL-MCNC: 24.9 MG/DL — HIGH (ref 8–20)
CALCIUM SERPL-MCNC: 8.8 MG/DL — SIGNIFICANT CHANGE UP (ref 8.4–10.5)
CHLORIDE SERPL-SCNC: 102 MMOL/L — SIGNIFICANT CHANGE UP (ref 96–108)
CO2 SERPL-SCNC: 26 MMOL/L — SIGNIFICANT CHANGE UP (ref 22–29)
CREAT SERPL-MCNC: 0.72 MG/DL — SIGNIFICANT CHANGE UP (ref 0.5–1.3)
EGFR: 93 ML/MIN/1.73M2 — SIGNIFICANT CHANGE UP
GLUCOSE SERPL-MCNC: 100 MG/DL — HIGH (ref 70–99)
HCT VFR BLD CALC: 39.2 % — SIGNIFICANT CHANGE UP (ref 34.5–45)
HGB BLD-MCNC: 13.1 G/DL — SIGNIFICANT CHANGE UP (ref 11.5–15.5)
MCHC RBC-ENTMCNC: 28.7 PG — SIGNIFICANT CHANGE UP (ref 27–34)
MCHC RBC-ENTMCNC: 33.4 GM/DL — SIGNIFICANT CHANGE UP (ref 32–36)
MCV RBC AUTO: 86 FL — SIGNIFICANT CHANGE UP (ref 80–100)
PLATELET # BLD AUTO: 278 K/UL — SIGNIFICANT CHANGE UP (ref 150–400)
POTASSIUM SERPL-MCNC: 4.6 MMOL/L — SIGNIFICANT CHANGE UP (ref 3.5–5.3)
POTASSIUM SERPL-SCNC: 4.6 MMOL/L — SIGNIFICANT CHANGE UP (ref 3.5–5.3)
RBC # BLD: 4.56 M/UL — SIGNIFICANT CHANGE UP (ref 3.8–5.2)
RBC # FLD: 13.6 % — SIGNIFICANT CHANGE UP (ref 10.3–14.5)
SODIUM SERPL-SCNC: 139 MMOL/L — SIGNIFICANT CHANGE UP (ref 135–145)
WBC # BLD: 10.12 K/UL — SIGNIFICANT CHANGE UP (ref 3.8–10.5)
WBC # FLD AUTO: 10.12 K/UL — SIGNIFICANT CHANGE UP (ref 3.8–10.5)

## 2023-07-08 PROCEDURE — 99233 SBSQ HOSP IP/OBS HIGH 50: CPT

## 2023-07-08 PROCEDURE — 99232 SBSQ HOSP IP/OBS MODERATE 35: CPT

## 2023-07-08 RX ADMIN — SENNA PLUS 2 TABLET(S): 8.6 TABLET ORAL at 21:11

## 2023-07-08 RX ADMIN — ENOXAPARIN SODIUM 40 MILLIGRAM(S): 100 INJECTION SUBCUTANEOUS at 21:10

## 2023-07-08 RX ADMIN — CHLORHEXIDINE GLUCONATE 1 APPLICATION(S): 213 SOLUTION TOPICAL at 05:04

## 2023-07-08 RX ADMIN — Medication 360 MILLIGRAM(S): at 05:03

## 2023-07-08 RX ADMIN — Medication 100 MILLIGRAM(S): at 05:03

## 2023-07-08 RX ADMIN — ATORVASTATIN CALCIUM 40 MILLIGRAM(S): 80 TABLET, FILM COATED ORAL at 21:10

## 2023-07-08 RX ADMIN — Medication 81 MILLIGRAM(S): at 12:39

## 2023-07-08 RX ADMIN — POLYETHYLENE GLYCOL 3350 17 GRAM(S): 17 POWDER, FOR SOLUTION ORAL at 12:39

## 2023-07-08 RX ADMIN — Medication 100 MILLIGRAM(S): at 17:00

## 2023-07-08 NOTE — PROGRESS NOTE ADULT - SUBJECTIVE AND OBJECTIVE BOX
[facility] Division of Hospital Medicine    Chief Complaint:  CVA     SUBJECTIVE / OVERNIGHT EVENTS:  Pt examined sitting up in chair   Denies any current neuro complaints   Patient denies chest pain, SOB, abd pain, N/V, fever, chills, dysuria or any other complaints. All remainder ROS negative.     MEDICATIONS  (STANDING):  aspirin enteric coated 81 milliGRAM(s) Oral daily  atorvastatin 40 milliGRAM(s) Oral at bedtime  chlorhexidine 2% Cloths 1 Application(s) Topical daily  diltiazem    milliGRAM(s) Oral daily  enoxaparin Injectable 40 milliGRAM(s) SubCutaneous <User Schedule>  metoprolol tartrate 100 milliGRAM(s) Oral every 12 hours  polyethylene glycol 3350 17 Gram(s) Oral daily  senna 2 Tablet(s) Oral at bedtime    MEDICATIONS  (PRN):  acetaminophen     Tablet .. 650 milliGRAM(s) Oral every 6 hours PRN Mild Pain (1 - 3), Moderate Pain (4 - 6)  hydrALAZINE Injectable 10 milliGRAM(s) IV Push every 4 hours PRN SBP>180  metoprolol tartrate Injectable 5 milliGRAM(s) IV Push every 6 hours PRN for sustain Afib >130  ondansetron Injectable 4 milliGRAM(s) IV Push every 6 hours PRN Nausea and/or Vomiting        I&O's Summary    07 Jul 2023 07:01  -  08 Jul 2023 07:00  --------------------------------------------------------  IN: 720 mL / OUT: 3 mL / NET: 717 mL        PHYSICAL EXAM:  Vital Signs Last 24 Hrs  T(C): 36.7 (08 Jul 2023 15:57), Max: 37 (08 Jul 2023 00:00)  T(F): 98.1 (08 Jul 2023 15:57), Max: 98.6 (08 Jul 2023 00:00)  HR: 90 (08 Jul 2023 16:58) (30 - 113)  BP: 120/72 (08 Jul 2023 16:58) (108/69 - 122/82)  BP(mean): --  RR: 17 (08 Jul 2023 16:58) (16 - 18)  SpO2: 98% (08 Jul 2023 16:58) (96% - 98%)    Parameters below as of 08 Jul 2023 16:58  Patient On (Oxygen Delivery Method): room air            CONSTITUTIONAL: Non toxic appearing, lying in bed  ENMT: Moist oral mucosa, no pharyngeal injection or exudates; normal dentition  RESPIRATORY: Normal respiratory effort; lungs are clear to auscultation bilaterally  CARDIOVASCULAR: Regular rate and rhythm, normal S1 and S2, no murmur/rub/gallop; No lower extremity edema; Peripheral pulses are 2+ bilaterally  ABDOMEN: Nontender to palpation, normoactive bowel sounds, no rebound/guarding; No hepatosplenomegaly  MUSCLOSKELETAL:  Normal gait; no clubbing or cyanosis of digits; no joint swelling or tenderness to palpation  PSYCH: A+O to person, place, and time; affect appropriate  NEUROLOGY: CN 2-12 are intact and symmetric; no gross sensory deficits;   SKIN: No rashes; no palpable lesions    LABS:                        13.1   10.12 )-----------( 278      ( 08 Jul 2023 06:15 )             39.2     07-08    139  |  102  |  24.9<H>  ----------------------------<  100<H>  4.6   |  26.0  |  0.72    Ca    8.8      08 Jul 2023 06:15            Urinalysis Basic - ( 08 Jul 2023 06:15 )    Color: x / Appearance: x / SG: x / pH: x  Gluc: 100 mg/dL / Ketone: x  / Bili: x / Urobili: x   Blood: x / Protein: x / Nitrite: x   Leuk Esterase: x / RBC: x / WBC x   Sq Epi: x / Non Sq Epi: x / Bacteria: x        CAPILLARY BLOOD GLUCOSE            RADIOLOGY & ADDITIONAL TESTS:  Results Reviewed:   Imaging Personally Reviewed:  Electrocardiogram Personally Reviewed:

## 2023-07-08 NOTE — PROGRESS NOTE ADULT - ASSESSMENT
62F history significant for obesity (BMI 34), HTN found with acute CVA (R-PCA) s/p TNK and thrombectomy and noted to be in Afib RVR, asymptomatic likely silent Afib for few months    -continue rate control changed to metoprolol 100mg BID and diltiazem 360mg, monitor if any longer duration of pauses >5 seconds, otherwise acceptable to continue current dosage.   -TTE LV EF 60-65% and moderate LA enlargement   - brain MRI with petechial hemorrhage and neurology recommend repeat CT head on 7/9 before decision if can start Eliquis 5mg BID, CHADSVasc 3-4, on ASA 81mg   -defer rhythm control until able to tolerate long term AC, outpatient MIRANDA/CV and consider future elective ablation, seen by EP/Dr. Wiggins  -outpatient sleep study         Elliot Mishra DO, [facility]  Faculty Non-Invasive Cardiologist  [phone].

## 2023-07-08 NOTE — PROGRESS NOTE ADULT - ASSESSMENT
62 F with HTN transferred from [place] after acute onset of CVA. Was given TNK and transferred to [facility] where underwent a mechanical thrombectomy. Found to have Afib wtth RVR    Acute CVA   s/p TNK and [facility] thrombectomy  Continue ASA and Statin for now  Repeat CT head in am to determine AC initiation     Newly diagnosed Afib / HTN  With RVR  Rates now controlled  Continue Diltiazem 360 / Metoprolol    Discussed with the patient at length

## 2023-07-08 NOTE — PROGRESS NOTE ADULT - SUBJECTIVE AND OBJECTIVE BOX
[place] [facility]                                                               Office: [address] [address]                                                              Telephone: [phone]. Fax:[phone]                                                                             PROGRESS NOTE  Reason for follow up: Persistent Afib, Embolic CVA  Overnight: recurrent pauses <4 seconds  Update: denies complains      Review of symptoms:   Cardiac:  No chest pain. No dyspnea. No palpitations.  Respiratory: No cough. No dyspnea  Gastrointestinal: No diarrhea. No abdominal pain. No bleeding.     Past medical history: No updates.   	  Vital Signs Last 24 Hrs  T(C): 36.7 (07-08-23 @ 15:57), Max: 37 (07-08-23 @ 00:00)  T(F): 98.1 (07-08-23 @ 15:57), Max: 98.6 (07-08-23 @ 00:00)  HR: 90 (07-08-23 @ 16:58) (30 - 113)  BP: 120/72 (07-08-23 @ 16:58) (108/69 - 122/82)  BP(mean): --  RR: 17 (07-08-23 @ 16:58) (16 - 18)  SpO2: 98% (07-08-23 @ 16:58) (96% - 98%)  I&O's Summary    07 Jul 2023 07:01  -  08 Jul 2023 07:00  --------------------------------------------------------  IN: 720 mL / OUT: 3 mL / NET: 717 mL          PHYSICAL EXAM:  Appearance: Comfortable. No acute distress  HEENT:  Head and neck: Atraumatic. Normocephalic.  Normal oral mucosa, PERRL, Neck is supple. No JVD, No carotid bruit.   Neurologic: A&Ox 3, no focal deficits. EOMI, Cranial nerves are intact.  Lymphatic: No cervical lymphadenopathy  Cardiovascular: Normal S1 S2, No murmur, rubs/gallops. No JVD, No edema  Respiratory: Lungs clear to auscultation  Gastrointestinal:  Soft, Non-tender, + BS  Lower Extremities: No edema  Psychiatry: Patient is calm. No agitation. Mood & affect appropriate  Skin: No rashes/ecchymoses/cyanosis/ulcers visualized on the face, hands or feet.      CURRENT MEDICATIONS:  MEDICATIONS  (STANDING):  aspirin enteric coated 81 milliGRAM(s) Oral daily  atorvastatin 40 milliGRAM(s) Oral at bedtime  chlorhexidine 2% Cloths 1 Application(s) Topical daily  diltiazem    milliGRAM(s) Oral daily  enoxaparin Injectable 40 milliGRAM(s) SubCutaneous <User Schedule>  metoprolol tartrate 100 milliGRAM(s) Oral every 12 hours  polyethylene glycol 3350 17 Gram(s) Oral daily  senna 2 Tablet(s) Oral at bedtime    MEDICATIONS  (PRN):  acetaminophen     Tablet .. 650 milliGRAM(s) Oral every 6 hours PRN Mild Pain (1 - 3), Moderate Pain (4 - 6)  hydrALAZINE Injectable 10 milliGRAM(s) IV Push every 4 hours PRN SBP>180  metoprolol tartrate Injectable 5 milliGRAM(s) IV Push every 6 hours PRN for sustain Afib >130  ondansetron Injectable 4 milliGRAM(s) IV Push every 6 hours PRN Nausea and/or Vomiting      DIAGNOSTIC TESTING:  [ ] Echocardiogram:   < from: TTE Echo Complete w/o Contrast w/ Doppler (07.03.23 @ 16:27) >  Summary:   1. Technically difficult study.   2. Endocardial visualization was enhanced with intravenous echo contrast.   3. Normal left ventricular internal cavity size.   4. Normal global left ventricular systolic function.   5. Left ventricular ejection fraction, by visual estimation, is 60 to   65%.   6. Moderately enlarged left atrium.   7. Normal right atrial size.   8. Sclerotic aortic valve with normal opening.   9. Mild thickening and calcification of the anterior and posterior   mitral valve leaflets.  10. Mild mitral valve regurgitation.  11. Mild to moderate mitral annular calcification.  12. Mild-moderate tricuspid regurgitation.  13. Color flow doppler and intravenous injection of agitated saline is   limited due the study quality.  14. Trivial pericardial effusion.    < end of copied text >    [ ]  Catheterization:  [ ] Stress Test:      Labs:                        13.1   10.12 )-----------( 278      ( 08 Jul 2023 06:15 )             39.2     07-08    139  |  102  |  24.9<H>  ----------------------------<  100<H>  4.6   |  26.0  |  0.72    Ca    8.8      08 Jul 2023 06:15       01 Jul 2023 18:50 Troponin <0.01 ng/mL / Creatine Kinase x     /  CKMB x     / CPK Mass Assay % x            Cholesterol 146 mg/dL; Direct LDL --; HDL Cholesterol, Serum 43 mg/dL; HDL/ Total Cholesterol Ratio Measurement --; Total Cholesterol/ HDL Ratio Measurement --; Triglycerides, Serum 58 mg/dL  A1C with Estimated Average Glucose Result: 5.0 % (07-02-23 @ 03:50)      TELEMETRY: Afib rate controlled, pauses longest 3.5 seconds

## 2023-07-09 LAB — APTT BLD: 31.1 SEC — SIGNIFICANT CHANGE UP (ref 27.5–35.5)

## 2023-07-09 PROCEDURE — 70450 CT HEAD/BRAIN W/O DYE: CPT | Mod: 26

## 2023-07-09 PROCEDURE — 99233 SBSQ HOSP IP/OBS HIGH 50: CPT

## 2023-07-09 PROCEDURE — 99232 SBSQ HOSP IP/OBS MODERATE 35: CPT

## 2023-07-09 RX ORDER — APIXABAN 2.5 MG/1
5 TABLET, FILM COATED ORAL EVERY 12 HOURS
Refills: 0 | Status: DISCONTINUED | OUTPATIENT
Start: 2023-07-09 | End: 2023-07-09

## 2023-07-09 RX ORDER — HEPARIN SODIUM 5000 [USP'U]/ML
INJECTION INTRAVENOUS; SUBCUTANEOUS
Qty: 25000 | Refills: 0 | Status: DISCONTINUED | OUTPATIENT
Start: 2023-07-09 | End: 2023-07-10

## 2023-07-09 RX ORDER — LIDOCAINE 4 G/100G
1 CREAM TOPICAL ONCE
Refills: 0 | Status: COMPLETED | OUTPATIENT
Start: 2023-07-09 | End: 2023-07-09

## 2023-07-09 RX ADMIN — Medication 360 MILLIGRAM(S): at 05:25

## 2023-07-09 RX ADMIN — SENNA PLUS 2 TABLET(S): 8.6 TABLET ORAL at 20:38

## 2023-07-09 RX ADMIN — ATORVASTATIN CALCIUM 40 MILLIGRAM(S): 80 TABLET, FILM COATED ORAL at 20:38

## 2023-07-09 RX ADMIN — LIDOCAINE 1 PATCH: 4 CREAM TOPICAL at 08:00

## 2023-07-09 RX ADMIN — Medication 100 MILLIGRAM(S): at 17:59

## 2023-07-09 RX ADMIN — CHLORHEXIDINE GLUCONATE 1 APPLICATION(S): 213 SOLUTION TOPICAL at 05:24

## 2023-07-09 RX ADMIN — LIDOCAINE 1 PATCH: 4 CREAM TOPICAL at 01:01

## 2023-07-09 RX ADMIN — HEPARIN SODIUM 1400 UNIT(S)/HR: 5000 INJECTION INTRAVENOUS; SUBCUTANEOUS at 16:59

## 2023-07-09 RX ADMIN — Medication 100 MILLIGRAM(S): at 05:25

## 2023-07-09 RX ADMIN — Medication 81 MILLIGRAM(S): at 08:07

## 2023-07-09 RX ADMIN — HEPARIN SODIUM 1400 UNIT(S)/HR: 5000 INJECTION INTRAVENOUS; SUBCUTANEOUS at 19:39

## 2023-07-09 RX ADMIN — LIDOCAINE 1 PATCH: 4 CREAM TOPICAL at 13:06

## 2023-07-09 RX ADMIN — HEPARIN SODIUM 1200 UNIT(S)/HR: 5000 INJECTION INTRAVENOUS; SUBCUTANEOUS at 16:11

## 2023-07-09 NOTE — PROGRESS NOTE ADULT - SUBJECTIVE AND OBJECTIVE BOX
[facility] Division of Hospital Medicine    Chief Complaint:  CVA     SUBJECTIVE / OVERNIGHT EVENTS:  Pt examined sitting up in chair   No acute overnight events   Patient denies chest pain, SOB, abd pain, N/V, fever, chills, dysuria or any other complaints. All remainder ROS negative.     MEDICATIONS  (STANDING):  atorvastatin 40 milliGRAM(s) Oral at bedtime  chlorhexidine 2% Cloths 1 Application(s) Topical daily  diltiazem    milliGRAM(s) Oral daily  heparin  Infusion.  Unit(s)/Hr (12 mL/Hr) IV Continuous <Continuous>  metoprolol tartrate 100 milliGRAM(s) Oral every 12 hours  polyethylene glycol 3350 17 Gram(s) Oral daily  senna 2 Tablet(s) Oral at bedtime    MEDICATIONS  (PRN):  acetaminophen     Tablet .. 650 milliGRAM(s) Oral every 6 hours PRN Mild Pain (1 - 3), Moderate Pain (4 - 6)  hydrALAZINE Injectable 10 milliGRAM(s) IV Push every 4 hours PRN SBP>180  metoprolol tartrate Injectable 5 milliGRAM(s) IV Push every 6 hours PRN for sustain Afib >130  ondansetron Injectable 4 milliGRAM(s) IV Push every 6 hours PRN Nausea and/or Vomiting      I&O's Summary    07 Jul 2023 07:01  -  08 Jul 2023 07:00  --------------------------------------------------------  IN: 720 mL / OUT: 3 mL / NET: 717 mL        PHYSICAL EXAM:  Vital Signs Last 24 Hrs  T(C): 36.7 (09 Jul 2023 20:36), Max: 36.7 (09 Jul 2023 07:42)  T(F): 98 (09 Jul 2023 20:36), Max: 98 (09 Jul 2023 07:42)  HR: 97 (09 Jul 2023 20:36) (78 - 120)  BP: 102/61 (09 Jul 2023 20:36) (102/61 - 135/90)  BP(mean): --  RR: 18 (09 Jul 2023 20:36) (17 - 18)  SpO2: 98% (09 Jul 2023 20:36) (92% - 100%)    Parameters below as of 09 Jul 2023 20:36  Patient On (Oxygen Delivery Method): room air          CONSTITUTIONAL: Non toxic appearing, lying in bed  ENMT: Moist oral mucosa, no pharyngeal injection or exudates; normal dentition  RESPIRATORY: Normal respiratory effort; lungs are clear to auscultation bilaterally  CARDIOVASCULAR: Regular rate and rhythm, normal S1 and S2, no murmur/rub/gallop; No lower extremity edema; Peripheral pulses are 2+ bilaterally  ABDOMEN: Nontender to palpation, normoactive bowel sounds, no rebound/guarding; No hepatosplenomegaly  MUSCLOSKELETAL:  Normal gait; no clubbing or cyanosis of digits; no joint swelling or tenderness to palpation  PSYCH: A+O to person, place, and time; affect appropriate  NEUROLOGY: CN 2-12 are intact and symmetric; no gross sensory deficits;   SKIN: No rashes; no palpable lesions    LABS:                        13.1   10.12 )-----------( 278      ( 08 Jul 2023 06:15 )             39.2   07-08    139  |  102  |  24.9<H>  ----------------------------<  100<H>  4.6   |  26.0  |  0.72    Ca    8.8      08 Jul 2023 06:15              Urinalysis Basic - ( 08 Jul 2023 06:15 )    Color: x / Appearance: x / SG: x / pH: x  Gluc: 100 mg/dL / Ketone: x  / Bili: x / Urobili: x   Blood: x / Protein: x / Nitrite: x   Leuk Esterase: x / RBC: x / WBC x   Sq Epi: x / Non Sq Epi: x / Bacteria: x        CAPILLARY BLOOD GLUCOSE            RADIOLOGY & ADDITIONAL TESTS:    7/9/23 CT Head   IMPRESSION:  Evolving acute/early subacute infarcts in the medial right temporal lobe/right hippocampus and right cerebellar hemisphere are grossly stable dating back to 07/02/2023.  No new intracranial findings.

## 2023-07-09 NOTE — PROGRESS NOTE ADULT - ASSESSMENT
62 F with HTN transferred from [place] after acute onset of CVA. Was given TNK and transferred to [facility] where underwent a mechanical thrombectomy. Found to have Afib wtth RVR    Acute CVA   s/p TNK and [facility] thrombectomy  Continue ASA and Statin for now  Repeat CT head reviewed. [place] MULTIPLE infarcts  d/w Neuro. Will start Heparin ggt (target PTT 40-60) for first 24 hours. NO BOLUS     Newly diagnosed Afib / HTN  With RVR  Rates now controlled  Continue Diltiazem 360 / Metoprolol  AC as above with plan for eventual transition to DOAC     Discussed with the patient at length   62 F with HTN transferred from [place] after acute onset of CVA. Was given TNK and transferred to [facility] where underwent a mechanical thrombectomy. Found to have Afib wtth RVR    Acute CVA   s/p TNK and [facility] thrombectomy  Continue ASA and Statin for now  Repeat CT head reviewed. [place] MULTIPLE infarcts  d/w Neuro. Will start Heparin ggt (target PTT 40-60) for first 24 hours. NO BOLUS     Newly diagnosed Afib / HTN  With RVR  Rates now controlled  Continue Diltiazem 360 / Metoprolol  AC as above with plan for eventual transition to DOAC   Agree with Cardiology recommendation, should have sleep study as outpt  Discussed with the patient at length

## 2023-07-09 NOTE — PROGRESS NOTE ADULT - SUBJECTIVE AND OBJECTIVE BOX
[facility]                                                         CARDIOLOGY AT [facility]                                                                  [address], [place] [address]                                                         Telephone: [phone]. Fax:[phone]                                                                             PROGRESS NOTE    Reason for follow up: Afib, CVA  Update: pauses less than 4 seconds, occasional tachycardia to 110, better controlled      Review of symptoms:   Cardiac:  No chest pain. No dyspnea. No palpitations.  Respiratory: no cough. No dyspnea  Gastrointestinal: No diarrhea. No abdominal pain. No bleeding.   Neuro: No focal neuro complaints.    Vitals:  T(C): 36.7 (07-09-23 @ 07:42), Max: 36.7 (07-08-23 @ 15:57)  HR: 120 (07-09-23 @ 07:42) (84 - 120)  BP: 120/84 (07-09-23 @ 07:42) (113/74 - 132/94)  RR: 18 (07-09-23 @ 07:42) (17 - 18)  SpO2: 98% (07-09-23 @ 07:42) (98% - 100%)  Wt(kg): --  I&O's Summary    08 Jul 2023 07:01  -  09 Jul 2023 07:00  --------------------------------------------------------  IN: 240 mL / OUT: 0 mL / NET: 240 mL    09 Jul 2023 07:01  -  09 Jul 2023 12:03  --------------------------------------------------------  IN: 240 mL / OUT: 0 mL / NET: 240 mL          PHYSICAL EXAM:  Appearance: Comfortable. No acute distress  HEENT:  Atraumatic. Normocephalic.  Normal oral mucosa  Neurologic: A & O x 3, no gross focal deficits.  Cardiovascular: RRR S1 S2, No murmur, no rubs/gallops. No JVD  Respiratory: Lungs clear to auscultation, unlabored   Gastrointestinal:  Soft, Non-tender, + BS  Lower Extremities: 2+ Peripheral Pulses, No clubbing, cyanosis, or edema  Psychiatry: Patient is calm. No agitation.   Skin: warm and dry.    CURRENT CARDIAC MEDICATIONS:  diltiazem    milliGRAM(s) Oral daily  hydrALAZINE Injectable 10 milliGRAM(s) IV Push every 4 hours PRN  metoprolol tartrate 100 milliGRAM(s) Oral every 12 hours  metoprolol tartrate Injectable 5 milliGRAM(s) IV Push every 6 hours PRN      CURRENT OTHER MEDICATIONS:  acetaminophen     Tablet .. 650 milliGRAM(s) Oral every 6 hours PRN Mild Pain (1 - 3), Moderate Pain (4 - 6)  ondansetron Injectable 4 milliGRAM(s) IV Push every 6 hours PRN Nausea and/or Vomiting  polyethylene glycol 3350 17 Gram(s) Oral daily  senna 2 Tablet(s) Oral at bedtime  atorvastatin 40 milliGRAM(s) Oral at bedtime  aspirin enteric coated 81 milliGRAM(s) Oral daily  chlorhexidine 2% Cloths 1 Application(s) Topical daily  enoxaparin Injectable 40 milliGRAM(s) SubCutaneous <User Schedule>      LABS:	 	  ( 01 Jul 2023 18:50 )  Troponin T  <0.01,  CPK  X    , CKMB  X    , BNP X                                  13.1   10.12 )-----------( 278      ( 08 Jul 2023 06:15 )             39.2     07-08    139  |  102  |  24.9<H>  ----------------------------<  100<H>  4.6   |  26.0  |  0.72    Ca    8.8      08 Jul 2023 06:15        Lipid Profile: Date: 07-02 @ 03:50  Total cholesterol 146; Direct LDL: --; HDL: 43; Triglycerides:58    HgA1c:   TSH:     TELEMETRY: Afib occasional RVR to 110 bpm  ECG:    DIAGNOSTIC TESTING:  [ ] Echocardiogram: < from: TTE Echo Complete w/o Contrast w/ Doppler (07.03.23 @ 16:27) >  PHYSICIAN INTERPRETATION:  Left Ventricle: Endocardial visualization was enhanced with intravenous   echo contrast. The left ventricular internal cavity sizeis normal.  Global LV systolic function was normal. Left ventricular ejection   fraction, by visual estimation, is 60 to 65%.  Right Ventricle: The right ventricle was not well visualized.  Left Atrium: Moderately enlarged left atrium. Color flow doppler and   intravenous injection of agitated saline demonstrates the presence of an   intact intra atrial septum.  Right Atrium: Normal right atrial size.  Pericardium: Trivial pericardial effusion is present. The pericardial   effusion is posterior to the left ventricle.  Mitral Valve: Mild thickening and calcification of the anterior and   posterior mitral valve leaflets. There is mild to moderate mitral annular   calcification. Mild mitral valve regurgitation is seen.  Tricuspid Valve: The tricuspid valve is not well seen. Mild-moderate   tricuspid regurgitation is visualized.  Aortic Valve: The aortic valve was not well visualized. Sclerotic aortic   valve with normal opening. Trivial aortic valve regurgitation is seen.  Pulmonic Valve: The pulmonic valve was not well visualized. Trace   pulmonic valve regurgitation.  Aorta: The aortic root is normal in size and structure.  Pulmonary Artery: The pulmonary artery is of normal size and origin.  Venous: The pulmonary veins were not well visualized. The inferior vena   cava was normal sized, with respiratory size variation less than 50%.  Shunts: Agitated saline contrast was given intravenously to evaluate for   intracardiac shunting.      Summary:   1. Technically difficult study.   2. Endocardial visualization was enhanced with intravenous echo contrast.   3. Normal left ventricular internal cavity size.   4. Normal global left ventricular systolic function.   5. Left ventricular ejection fraction, by visual estimation, is 60 to   65%.   6. Moderately enlarged left atrium.   7. Normal right atrial size.   8. Sclerotic aortic valve with normal opening.   9. Mild thickening and calcification of the anterior and posterior   mitral valve leaflets.  10. Mild mitral valve regurgitation.  11. Mild to moderate mitral annular calcification.  12. Mild-moderate tricuspid regurgitation.  13. Color flow doppler and intravenous injection of agitated saline is   limited due the study quality.  14. Trivial pericardial effusion.    MD Colton Electronically signed on 7/4/2023 at 1:19:14 PM    < end of copied text >    [ ]  Catheterization:  [ ] Stress Test:    OTHER:

## 2023-07-09 NOTE — PROGRESS NOTE ADULT - ASSESSMENT
62F history significant for obesity (BMI 34), HTN found with acute CVA (R-PCA) s/p TNK and thrombectomy and noted to be in Afib RVR, asymptomatic likely silent Afib for few months

## 2023-07-09 NOTE — PROGRESS NOTE ADULT - PROBLEM SELECTOR PLAN 1
.  -continue rate control changed to metoprolol 100mg BID and diltiazem 360mg  - if any longer duration of pauses >5 seconds, otherwise acceptable to continue current dosage. Occasional pauses less than 4 seconds  -TTE LV EF 60-65% and moderate LA enlargement   - brain MRI with petechial hemorrhage   - neurology recommend repeat CT head on 7/9 before decision if can start Eliquis 5mg BID, CHADSVasc 3-4, on ASA 81mg   - CT head performed, awaiting read  -defer rhythm control until able to tolerate long term AC, outpatient MIRANDA/CV and consider future elective ablation, seen by EP/Dr. Wiggins  -outpatient sleep study

## 2023-07-09 NOTE — PROGRESS NOTE ADULT - TIME BILLING
Persistent Afib, Embolic CVA
Afib, CVA
Persistent Afib, Embolic CVAs
Persistent Afib with RVR, Acute CVA
General
Persistent AFib RVR, Embolic CVA
Persistent Afib, Cardioembolic CVA
Acute CVA, Persistent Afib w/ RVR

## 2023-07-10 LAB
APTT BLD: 45.3 SEC — HIGH (ref 27.5–35.5)
APTT BLD: 65.6 SEC — HIGH (ref 27.5–35.5)
APTT BLD: 86.6 SEC — HIGH (ref 27.5–35.5)
HCT VFR BLD CALC: 39.5 % — SIGNIFICANT CHANGE UP (ref 34.5–45)
HCT VFR BLD CALC: 40.5 % — SIGNIFICANT CHANGE UP (ref 34.5–45)
HGB BLD-MCNC: 13 G/DL — SIGNIFICANT CHANGE UP (ref 11.5–15.5)
HGB BLD-MCNC: 13.4 G/DL — SIGNIFICANT CHANGE UP (ref 11.5–15.5)
MCHC RBC-ENTMCNC: 28.6 PG — SIGNIFICANT CHANGE UP (ref 27–34)
MCHC RBC-ENTMCNC: 28.8 PG — SIGNIFICANT CHANGE UP (ref 27–34)
MCHC RBC-ENTMCNC: 32.9 GM/DL — SIGNIFICANT CHANGE UP (ref 32–36)
MCHC RBC-ENTMCNC: 33.1 GM/DL — SIGNIFICANT CHANGE UP (ref 32–36)
MCV RBC AUTO: 86.8 FL — SIGNIFICANT CHANGE UP (ref 80–100)
MCV RBC AUTO: 87.1 FL — SIGNIFICANT CHANGE UP (ref 80–100)
PLATELET # BLD AUTO: 277 K/UL — SIGNIFICANT CHANGE UP (ref 150–400)
PLATELET # BLD AUTO: 330 K/UL — SIGNIFICANT CHANGE UP (ref 150–400)
RBC # BLD: 4.55 M/UL — SIGNIFICANT CHANGE UP (ref 3.8–5.2)
RBC # BLD: 4.65 M/UL — SIGNIFICANT CHANGE UP (ref 3.8–5.2)
RBC # FLD: 13.5 % — SIGNIFICANT CHANGE UP (ref 10.3–14.5)
RBC # FLD: 13.6 % — SIGNIFICANT CHANGE UP (ref 10.3–14.5)
WBC # BLD: 10.64 K/UL — HIGH (ref 3.8–10.5)
WBC # BLD: 12.52 K/UL — HIGH (ref 3.8–10.5)
WBC # FLD AUTO: 10.64 K/UL — HIGH (ref 3.8–10.5)
WBC # FLD AUTO: 12.52 K/UL — HIGH (ref 3.8–10.5)

## 2023-07-10 PROCEDURE — 99232 SBSQ HOSP IP/OBS MODERATE 35: CPT

## 2023-07-10 PROCEDURE — 99233 SBSQ HOSP IP/OBS HIGH 50: CPT

## 2023-07-10 PROCEDURE — 93970 EXTREMITY STUDY: CPT | Mod: 26

## 2023-07-10 PROCEDURE — 93926 LOWER EXTREMITY STUDY: CPT | Mod: 26,RT

## 2023-07-10 RX ORDER — HEPARIN SODIUM 5000 [USP'U]/ML
1000 INJECTION INTRAVENOUS; SUBCUTANEOUS
Qty: 25000 | Refills: 0 | Status: DISCONTINUED | OUTPATIENT
Start: 2023-07-10 | End: 2023-07-11

## 2023-07-10 RX ADMIN — POLYETHYLENE GLYCOL 3350 17 GRAM(S): 17 POWDER, FOR SOLUTION ORAL at 17:40

## 2023-07-10 RX ADMIN — HEPARIN SODIUM 0 UNIT(S)/HR: 5000 INJECTION INTRAVENOUS; SUBCUTANEOUS at 08:28

## 2023-07-10 RX ADMIN — HEPARIN SODIUM 0 UNIT(S)/HR: 5000 INJECTION INTRAVENOUS; SUBCUTANEOUS at 00:13

## 2023-07-10 RX ADMIN — Medication 100 MILLIGRAM(S): at 04:52

## 2023-07-10 RX ADMIN — Medication 360 MILLIGRAM(S): at 04:52

## 2023-07-10 RX ADMIN — Medication 650 MILLIGRAM(S): at 21:27

## 2023-07-10 RX ADMIN — HEPARIN SODIUM 10 UNIT(S)/HR: 5000 INJECTION INTRAVENOUS; SUBCUTANEOUS at 20:27

## 2023-07-10 RX ADMIN — HEPARIN SODIUM 1100 UNIT(S)/HR: 5000 INJECTION INTRAVENOUS; SUBCUTANEOUS at 06:55

## 2023-07-10 RX ADMIN — Medication 650 MILLIGRAM(S): at 21:57

## 2023-07-10 RX ADMIN — Medication 100 MILLIGRAM(S): at 17:39

## 2023-07-10 RX ADMIN — SENNA PLUS 2 TABLET(S): 8.6 TABLET ORAL at 21:27

## 2023-07-10 RX ADMIN — HEPARIN SODIUM 1100 UNIT(S)/HR: 5000 INJECTION INTRAVENOUS; SUBCUTANEOUS at 01:15

## 2023-07-10 RX ADMIN — CHLORHEXIDINE GLUCONATE 1 APPLICATION(S): 213 SOLUTION TOPICAL at 04:51

## 2023-07-10 RX ADMIN — HEPARIN SODIUM 800 UNIT(S)/HR: 5000 INJECTION INTRAVENOUS; SUBCUTANEOUS at 19:22

## 2023-07-10 RX ADMIN — HEPARIN SODIUM 800 UNIT(S)/HR: 5000 INJECTION INTRAVENOUS; SUBCUTANEOUS at 09:39

## 2023-07-10 RX ADMIN — HEPARIN SODIUM 800 UNIT(S)/HR: 5000 INJECTION INTRAVENOUS; SUBCUTANEOUS at 17:40

## 2023-07-10 RX ADMIN — ATORVASTATIN CALCIUM 40 MILLIGRAM(S): 80 TABLET, FILM COATED ORAL at 21:27

## 2023-07-10 NOTE — PROGRESS NOTE ADULT - SUBJECTIVE AND OBJECTIVE BOX
[facility] Division of Hospital Medicine    Chief Complaint:  CVA     SUBJECTIVE / OVERNIGHT EVENTS:  Pt examined sitting up in chair   No acute overnight events   Reported with PURVIE denny horse like sensation  Patient denies chest pain, SOB, abd pain, N/V, fever, chills, dysuria or any other complaints. All remainder ROS negative.     MEDICATIONS  (STANDING):  atorvastatin 40 milliGRAM(s) Oral at bedtime  chlorhexidine 2% Cloths 1 Application(s) Topical daily  diltiazem    milliGRAM(s) Oral daily  heparin  Infusion.  Unit(s)/Hr (12 mL/Hr) IV Continuous <Continuous>  metoprolol tartrate 100 milliGRAM(s) Oral every 12 hours  polyethylene glycol 3350 17 Gram(s) Oral daily  senna 2 Tablet(s) Oral at bedtime    MEDICATIONS  (PRN):  acetaminophen     Tablet .. 650 milliGRAM(s) Oral every 6 hours PRN Mild Pain (1 - 3), Moderate Pain (4 - 6)  hydrALAZINE Injectable 10 milliGRAM(s) IV Push every 4 hours PRN SBP>180  metoprolol tartrate Injectable 5 milliGRAM(s) IV Push every 6 hours PRN for sustain Afib >130  ondansetron Injectable 4 milliGRAM(s) IV Push every 6 hours PRN Nausea and/or Vomiting      I&O's Summary    07 Jul 2023 07:01  -  08 Jul 2023 07:00  --------------------------------------------------------  IN: 720 mL / OUT: 3 mL / NET: 717 mL        PHYSICAL EXAM:  Vital Signs Last 24 Hrs  T(C): 37.3 (10 Jul 2023 17:58), Max: 37.3 (10 Jul 2023 17:58)  T(F): 99.1 (10 Jul 2023 17:58), Max: 99.1 (10 Jul 2023 17:58)  HR: 82 (10 Jul 2023 17:58) (81 - 98)  BP: 115/80 (10 Jul 2023 17:58) (102/61 - 128/87)  BP(mean): --  RR: 18 (10 Jul 2023 17:58) (18 - 18)  SpO2: 96% (10 Jul 2023 17:58) (96% - 98%)    Parameters below as of 10 Jul 2023 17:58  Patient On (Oxygen Delivery Method): room air              CONSTITUTIONAL: Non toxic appearing, lying in bed  ENMT: Moist oral mucosa, no pharyngeal injection or exudates; normal dentition  RESPIRATORY: Normal respiratory effort; lungs are clear to auscultation bilaterally  CARDIOVASCULAR: Regular rate and rhythm, normal S1 and S2, no murmur/rub/gallop; No lower extremity edema; Peripheral pulses are 2+ bilaterally  ABDOMEN: Nontender to palpation, normoactive bowel sounds, no rebound/guarding; No hepatosplenomegaly  MUSCLOSKELETAL:  Normal gait; no clubbing or cyanosis of digits; no joint swelling or tenderness to palpation  PSYCH: A+O to person, place, and time; affect appropriate  NEUROLOGY: CN 2-12 are intact and symmetric; no gross sensory deficits;   SKIN: No rashes; no palpable lesions    LABS:                        13.1   10.12 )-----------( 278      ( 08 Jul 2023 06:15 )             39.2   07-08    139  |  102  |  24.9<H>  ----------------------------<  100<H>  4.6   |  26.0  |  0.72    Ca    8.8      08 Jul 2023 06:15              Urinalysis Basic - ( 08 Jul 2023 06:15 )    Color: x / Appearance: x / SG: x / pH: x  Gluc: 100 mg/dL / Ketone: x  / Bili: x / Urobili: x   Blood: x / Protein: x / Nitrite: x   Leuk Esterase: x / RBC: x / WBC x   Sq Epi: x / Non Sq Epi: x / Bacteria: x        CAPILLARY BLOOD GLUCOSE            RADIOLOGY & ADDITIONAL TESTS:    7/9/23 CT Head   IMPRESSION:  Evolving acute/early subacute infarcts in the medial right temporal lobe/right hippocampus and right cerebellar hemisphere are grossly stable dating back to 07/02/2023.  No new intracranial findings.

## 2023-07-10 NOTE — PROGRESS NOTE ADULT - ASSESSMENT
INCOMPLETE  ASSESSMENT: This is 63 y/o female with hx of HTN, remote hyperthyroid, family hx of CVA (mother, brother) who presented to [facility] with left-sided numbness, weakness and slurred speech on the evening of 7/1/23.  CT head without acute infarction or hemorrhage, CT Angio revealed right PCA near-complete occlusion at P1/P2 junction. Pt was given tenecteplase and she was then transferred to [facility] where she underwent mechanical thrombectomy with TICI 3 recanalization.   Pt was found to have Afib with RVR on telemetry. Repeat CT head with hypodensity in right superior cerebellar region concerning for acute infarction. 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 Etiology likely cardioembolic in setting of atrial fibrillation.     NEURO: Repeat head CT on 7/9/23 showed evolving acute/early subacute infarcts in the medial right temporal lobe/right hippocampus and right cerebellar are grossly stable with no new intracranial findings. neurologically overall improving, Continue close monitoring for neurologic deterioration , stroke neuro checks q4 hr,  Follow up with Dr. Zhou re: incidental aneurysm upon discharge and post stroke follow up,   SBP goal 110-130mmHg being neurologically tolerated at this time avoid rapid fluctuations and hypotension, titrate statin to LDL goal less than 70, MRI Brain w/o as noted , Physical therapy/OT/Speech eval/treatment.     ANTITHROMBOTIC THERAPY: Due to repeat head CT on 7/9/23 being stable, can initiate anticoagulation in setting of atrial fibrillation for secondary stroke prevention. If patient is therapeutically anticoagulated, ASA 81mg is not neurologically indicated at this time. Risks/benefits/ alternatives discussed with patient who verbalizes, expresses understanding.     PULMONARY: protecting airway, saturating well     CARDIOVASCULAR:  TTE: as noted, cardiac monitoring to ensure rate control given recent RVR , cardiology follow up appreciated                           GASTROINTESTINAL:  dysphagia screen  passed, advance as tolerated per protocol      RENAL: BUN slightly elevated/Cr within range, monitor urine output, maintain adequate hydration       Na Goal:  135-145    HEMATOLOGY: H/H without anemia, Platelets 277, patient should have all age and risk appropriate malignancy screenings with PCP or sooner if clinically suspected      DVT ppx: Heparin s.c [] LMWH [] - hep gtt    ID: afebrile,  leukocytosis, monitor for si/sx of infection     OTHER:  condition and plan of care d/w patient, questions and concerns addressed.     DISPOSITION: Rehab or home depending on PT eval once stable and workup is complete      CORE MEASURES:        Admission NIHSS:  2     Tenecteplase : [x] YES [] NO      LDL/HDL/A1C: 91/43/5.0     Depression Screen- if depression hx and/or present      Statin Therapy:  as noted      Dysphagia Screen: [x] PASS [] FAIL     Smoking [] YES [x] NO      Afib [x] YES [] NO     Stroke Education [x] YES [] NO    Obtain screening lower extremity venous ultrasound in patients who meet 1 or more of the following criteria as patient is high risk for DVT/PE on admission:   [] History of DVT/PE  []Hypercoagulable states (Factor V Leiden, Cancer, OCP, etc. )  []Prolonged immobility (hemiplegia/hemiparesis/post operative or any other extended immobilization)  [] Transferred from outside facility (Rehab or Long term care)  [] Age </= to 50  INCOMPLETE  ASSESSMENT: This is 61 y/o female with hx of HTN, remote hyperthyroid, family hx of CVA (mother, brother) who presented to [facility] with left-sided numbness, weakness and slurred speech on the evening of 7/1/23.  CT head without acute infarction or hemorrhage, CT Angio revealed right PCA near-complete occlusion at P1/P2 junction. Pt was given tenecteplase and she was then transferred to [facility] where she underwent mechanical thrombectomy with TICI 3 recanalization.   Pt was found to have Afib with RVR on telemetry. Repeat CT head with hypodensity in right superior cerebellar region concerning for acute infarction. 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 Etiology likely cardioembolic in setting of atrial fibrillation.     NEURO: Repeat head CT on 7/9/23 showed evolving acute/early subacute infarcts in the medial right temporal lobe/right hippocampus and right cerebellar are grossly stable with no new intracranial findings. neurologically overall improving, Continue close monitoring for neurologic deterioration , stroke neuro checks q4 hr,  Follow up with Dr. Zhou re: incidental aneurysm upon discharge and post stroke follow up,  SBP goal 110-130mmHg being neurologically tolerated at this time avoid rapid fluctuations and hypotension, titrate statin to LDL goal less than 70, MRI Brain w/o as noted , Physical therapy/OT/Speech eval/treatment.     ANTITHROMBOTIC THERAPY: Due to repeat head CT on 7/9/23 being stable, can initiate anticoagulation in setting of atrial fibrillation for secondary stroke prevention. Heparin gtt with PTT goal of 60- 90 x 24 hours (No bolus) and if remains neurologically stable can transition to Elliquis 5mg BID. If patient is therapeutically anticoagulated, ASA 81mg is not neurologically indicated at this time. Risks/benefits/ alternatives discussed with patient who verbalizes, expresses understanding.     PULMONARY: protecting airway, saturating well     CARDIOVASCULAR:  TTE: as noted, cardiac monitoring to ensure rate control given recent RVR , cardiology follow up appreciated                           GASTROINTESTINAL:  dysphagia screen  passed, advance as tolerated per protocol      RENAL: BUN slightly elevated/Cr within range, monitor urine output, maintain adequate hydration       Na Goal:  135-145    HEMATOLOGY: H/H without anemia, Platelets 277, patient should have all age and risk appropriate malignancy screenings with PCP or sooner if clinically suspected      DVT ppx: Heparin s.c [] LMWH [] - hep gtt    ID: afebrile,  leukocytosis, monitor for si/sx of infection     OTHER:  condition and plan of care d/w patient, questions and concerns addressed.     DISPOSITION: Rehab or home depending on PT eval once stable and workup is complete      CORE MEASURES:        Admission NIHSS:  2     Tenecteplase : [x] YES [] NO      LDL/HDL/A1C: 91/43/5.0     Depression Screen- if depression hx and/or present      Statin Therapy:  as noted      Dysphagia Screen: [x] PASS [] FAIL     Smoking [] YES [x] NO      Afib [x] YES [] NO     Stroke Education [x] YES [] NO    Obtain screening lower extremity venous ultrasound in patients who meet 1 or more of the following criteria as patient is high risk for DVT/PE on admission:   [] History of DVT/PE  []Hypercoagulable states (Factor V Leiden, Cancer, OCP, etc. )  []Prolonged immobility (hemiplegia/hemiparesis/post operative or any other extended immobilization)  [] Transferred from outside facility (Rehab or Long term care)  [] Age </= to 50 ASSESSMENT: This is 61 y/o female with hx of HTN, remote hyperthyroid, family hx of CVA (mother, brother) who presented to [facility] with left-sided numbness, weakness and slurred speech on the evening of 7/1/23.  CT head without acute infarction or hemorrhage, CT Angio revealed right PCA near-complete occlusion at P1/P2 junction. Pt was given tenecteplase and she was then transferred to [facility] where she underwent mechanical thrombectomy with TICI 3 recanalization.   Pt was found to have Afib with RVR on telemetry. Repeat CT head with hypodensity in right superior cerebellar region concerning for acute infarction. 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 Etiology likely cardioembolic in setting of atrial fibrillation.     NEURO: Repeat head CT on 7/9/23 showed evolving acute/early subacute infarcts in the medial right temporal lobe/right hippocampus and right cerebellar are grossly stable with no new intracranial findings. Due to reported right lower extremity pain  neurologically overall improving, Continue close monitoring for neurologic deterioration , stroke neuro checks q4 hr,  Follow up with Dr. Zhou re: incidental aneurysm upon discharge and post stroke follow up,  SBP goal 110-130mmHg being neurologically tolerated at this time avoid rapid fluctuations and hypotension, titrate statin to LDL goal less than 70, MRI Brain w/o as noted , Physical therapy/OT/Speech eval/treatment.     ANTITHROMBOTIC THERAPY: Due to repeat head CT on 7/9/23 being stable, can initiate anticoagulation in setting of atrial fibrillation for secondary stroke prevention. Heparin gtt with PTT goal of 60- 80 x 24 hours (No bolus) and if patient remains neurologically stable can transition to Elliquis 5mg BID. If patient is therapeutically anticoagulated, ASA 81mg is not neurologically indicated at this time. Risks/benefits/ alternatives discussed with patient who verbalizes, expresses understanding.     PULMONARY: protecting airway, saturating well     CARDIOVASCULAR:  TTE: as noted, cardiac monitoring to ensure rate control given recent RVR , cardiology follow up appreciated                           GASTROINTESTINAL:  dysphagia screen  passed, advance as tolerated per protocol      RENAL: BUN slightly elevated/Cr within range, monitor urine output, maintain adequate hydration       Na Goal:  135-145    HEMATOLOGY: H/H without anemia, Platelets 277, patient should have all age and risk appropriate malignancy screenings with PCP or sooner if clinically suspected      DVT ppx: Heparin s.c [] LMWH [] - hep gtt    ID: afebrile,  leukocytosis, monitor for si/sx of infection     OTHER:  condition and plan of care d/w patient, questions and concerns addressed.     DISPOSITION: Rehab or home depending on PT eval once stable and workup is complete      CORE MEASURES:        Admission NIHSS:  2     Tenecteplase : [x] YES [] NO      LDL/HDL/A1C: 91/43/5.0     Depression Screen- if depression hx and/or present      Statin Therapy:  as noted      Dysphagia Screen: [x] PASS [] FAIL     Smoking [] YES [x] NO      Afib [x] YES [] NO     Stroke Education [x] YES [] NO    Obtain screening lower extremity venous ultrasound in patients who meet 1 or more of the following criteria as patient is high risk for DVT/PE on admission:   [] History of DVT/PE  []Hypercoagulable states (Factor V Leiden, Cancer, OCP, etc. )  []Prolonged immobility (hemiplegia/hemiparesis/post operative or any other extended immobilization)  [] Transferred from outside facility (Rehab or Long term care)  [] Age </= to 50 ASSESSMENT: This is 61 y/o female with hx of HTN, remote hyperthyroid, family hx of CVA (mother, brother) who presented to [facility] with left-sided numbness, weakness and slurred speech on the evening of 7/1/23.  CT head without acute infarction or hemorrhage, CT Angio revealed right PCA near-complete occlusion at P1/P2 junction. Pt was given tenecteplase and she was then transferred to [facility] where she underwent mechanical thrombectomy with TICI 3 recanalization.   Pt was found to have Afib with RVR on telemetry. Repeat CT head with hypodensity in right superior cerebellar region concerning for acute infarction. 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 Etiology likely cardioembolic in setting of atrial fibrillation.     NEURO: Repeat head CT on 7/9/23 showed evolving acute/early subacute infarcts in the medial right temporal lobe/right hippocampus and right cerebellar are grossly stable with no new intracranial findings. Due to reported right lower extremity pain suggest lower extremity dopplers. Groin site with slight ecchymosis and is warm dry with pulses intact. Obtain right arterial groin US to ensure stability. Neurologically overall improving, Continue close monitoring for neurologic deterioration , stroke neuro checks q4 hr,  Follow up with Dr. Zhou re: incidental aneurysm upon discharge and post stroke follow up,  SBP goal 110-130mmHg being neurologically tolerated at this time avoid rapid fluctuations and hypotension, titrate statin to LDL goal less than 70, MRI Brain w/o as noted , Physical therapy/OT/Speech eval/treatment.     ANTITHROMBOTIC THERAPY: Due to repeat head CT on 7/9/23 being stable, can initiate anticoagulation in setting of atrial fibrillation for secondary stroke prevention. Heparin gtt with PTT goal of 60- 80 x 24 hours (No bolus) and if patient remains neurologically stable can transition to Elliquis 5mg BID. If patient is therapeutically anticoagulated, ASA 81mg is not neurologically indicated at this time. Risks/benefits/ alternatives discussed with patient who verbalizes, expresses understanding.     PULMONARY: protecting airway, saturating well     CARDIOVASCULAR:  TTE: as noted, cardiac monitoring to ensure rate control given recent RVR , cardiology follow up appreciated                           GASTROINTESTINAL:  dysphagia screen  passed, advance as tolerated per protocol      RENAL: BUN slightly elevated/Cr within range, monitor urine output, maintain adequate hydration       Na Goal:  135-145    HEMATOLOGY: H/H without anemia, Platelets 277, patient should have all age and risk appropriate malignancy screenings with PCP or sooner if clinically suspected      DVT ppx: Heparin s.c [] LMWH [] - hep gtt    ID: afebrile,  leukocytosis, monitor for si/sx of infection     OTHER:  condition and plan of care d/w patient, questions and concerns addressed.     DISPOSITION: Rehab or home depending on PT eval once stable and workup is complete      CORE MEASURES:        Admission NIHSS:  2     Tenecteplase : [x] YES [] NO      LDL/HDL/A1C: 91/43/5.0     Depression Screen- if depression hx and/or present      Statin Therapy:  as noted      Dysphagia Screen: [x] PASS [] FAIL     Smoking [] YES [x] NO      Afib [x] YES [] NO     Stroke Education [x] YES [] NO    Obtain screening lower extremity venous ultrasound in patients who meet 1 or more of the following criteria as patient is high risk for DVT/PE on admission:   [] History of DVT/PE  []Hypercoagulable states (Factor V Leiden, Cancer, OCP, etc. )  []Prolonged immobility (hemiplegia/hemiparesis/post operative or any other extended immobilization)  [] Transferred from outside facility (Rehab or Long term care)  [] Age </= to 50

## 2023-07-10 NOTE — PROGRESS NOTE ADULT - SUBJECTIVE AND OBJECTIVE BOX
Preliminary note, offical recommendations pending attending review/signature   [facility] Stroke Team  Progress Note     HPI:  This is a 62y Female HTN  last seen well at 1200 noon 7/2/23 . Developed Left sided numbness weakness. CT CTA CTP revealed L P!- P2 occlusion with no core and 40 ml penumbra. Patient was given tenecteplase at [facility] and transferred to [facility] for  thrombectomy and further care.  NIH 2 ICH 0 MRS 1    SUBJECTIVE: No events overnight.  No new neurologic complaints.  ROS reported negative unless otherwise noted.    acetaminophen     Tablet .. 650 milliGRAM(s) Oral every 6 hours PRN  atorvastatin 40 milliGRAM(s) Oral at bedtime  chlorhexidine 2% Cloths 1 Application(s) Topical daily  diltiazem    milliGRAM(s) Oral daily  heparin  Infusion.  Unit(s)/Hr IV Continuous <Continuous>  hydrALAZINE Injectable 10 milliGRAM(s) IV Push every 4 hours PRN  metoprolol tartrate 100 milliGRAM(s) Oral every 12 hours  metoprolol tartrate Injectable 5 milliGRAM(s) IV Push every 6 hours PRN  ondansetron Injectable 4 milliGRAM(s) IV Push every 6 hours PRN  polyethylene glycol 3350 17 Gram(s) Oral daily  senna 2 Tablet(s) Oral at bedtime      PHYSICAL EXAM:   Vital Signs Last 24 Hrs  T(C): 36.7 (10 Jul 2023 08:00), Max: 36.8 (10 Jul 2023 04:38)  T(F): 98.1 (10 Jul 2023 08:00), Max: 98.2 (10 Jul 2023 04:38)  HR: 81 (10 Jul 2023 08:00) (78 - 107)  BP: 128/87 (10 Jul 2023 08:00) (102/61 - 135/90)  BP(mean): --  RR: 18 (10 Jul 2023 08:00) (18 - 18)  SpO2: 98% (10 Jul 2023 08:00) (92% - 98%)    Parameters below as of 10 Jul 2023 08:00  Patient On (Oxygen Delivery Method): room air     PENDING  General: No acute distress    NEUROLOGICAL EXAM:  Mental status: Awake, alert, oriented x3, speech fluent, follows commands, no neglect, normal memory   Cranial Nerves: No facial asymmetry, no nystagmus, no dysarthria,  tongue midline  Motor exam: Normal tone, no drift, 5/5 RUE, 5/5 RLE, 5/5 LUE, 5/5 LLE, normal fine finger movements.  Sensation: Intact to light touch   Coordination/ Gait: No dysmetria, gait not tested    LABS:                        13.0   10.64 )-----------( 277      ( 10 Jul 2023 06:59 )             39.5        PTT - ( 10 Jul 2023 06:59 )  PTT:65.6 sec        IMAGING: Reviewed by me.   CT Head No Cont (07.09.23 @ 11:31)   IMPRESSION:  Evolving acute/early subacute infarcts in the medial right temporal   lobe/right hippocampus and right cerebellar hemisphere are grossly stable   dating back to 07/02/2023.  No new intracranial findings.      MR Head No Cont (07.06.23 @ 09:56)   Small regions of restricted diffusion in the right mesial frontal lobe,   right thalamus, and right superior cerebellar hemisphere, consistent with   acute right PCA territory infarcts.    A few punctate foci of restricted diffusion in the left temporal lobe,   consistent with tiny acute left PCA territory infarcts.    Petechial hemorrhagic transformation involving the right mesial temporal   infarct.    CT Head No Cont (07.02.23 @ 14:07)   New focus of low density in the right superior cerebellar hemisphere   suggesting the presence of acute infarction (2-15). No david hemorrhagic   transformation.    CT head 7/1/23 12:49  IMPRESSION:  No acute intracranial bleeding.    7/2/23 13: 01  CT PERFUSION: Moderate sized right PCA ischemic penumbra involving the right  occipital lobe and posterior right mesial temporal lobe measuring  approximately 40 cc. No significant core infarction.  CTA BRAIN: *Near-complete occlusion of the right PCA at the P1/P2 junction,  corresponding to ischemic penumbra on concurrent perfusion..  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 No  associated calcification or thrombus.  CTA NECK: Patent cervical vasculature. No flow limiting stenosis or  occlusion.      TTE Echo Complete w/o Contrast w/ Doppler (07.03.23 @ 16:27)   Summary:   1. Technically difficult study.   2. Endocardial visualization was enhanced with intravenous echo contrast.   3. Normal left ventricular internal cavity size.   4. Normal global left ventricular systolic function.   5. Left ventricular ejection fraction, by visual estimation, is 60 to   65%.   6. Moderately enlarged left atrium.   7. Normal right atrial size.   8. Sclerotic aortic valve with normal opening.   9. Mild thickening and calcification of the anterior and posterior   mitral valve leaflets.  10. Mild mitral valve regurgitation.  11. Mild to moderate mitral annular calcification.  12. Mild-moderate tricuspid regurgitation.  13. Color flow doppler and intravenous injection of agitated saline is   limited due the study quality.  14. Trivial pericardial effusion.   Preliminary note, offical recommendations pending attending review/signature   [facility] Stroke Team  Progress Note     HPI:  This is a 62y Female HTN  last seen well at 1200 noon 7/2/23 . Developed Left sided numbness weakness. CT CTA CTP revealed L P!- P2 occlusion with no core and 40 ml penumbra. Patient was given tenecteplase at [facility] and transferred to [facility] for  thrombectomy and further care.  NIH 2 ICH 0 MRS 1    SUBJECTIVE:  Stated that she was unable to bear weight on her right leg. Stated that it feels like a "denny horse sensation" and she currently has some pain there today.No new neurologic complaints.  ROS reported negative unless otherwise noted. Reported episode of right lower extremity pain right below the knee anteriorly last night.    acetaminophen     Tablet .. 650 milliGRAM(s) Oral every 6 hours PRN  atorvastatin 40 milliGRAM(s) Oral at bedtime  chlorhexidine 2% Cloths 1 Application(s) Topical daily  diltiazem    milliGRAM(s) Oral daily  heparin  Infusion.  Unit(s)/Hr IV Continuous <Continuous>  hydrALAZINE Injectable 10 milliGRAM(s) IV Push every 4 hours PRN  metoprolol tartrate 100 milliGRAM(s) Oral every 12 hours  metoprolol tartrate Injectable 5 milliGRAM(s) IV Push every 6 hours PRN  ondansetron Injectable 4 milliGRAM(s) IV Push every 6 hours PRN  polyethylene glycol 3350 17 Gram(s) Oral daily  senna 2 Tablet(s) Oral at bedtime      PHYSICAL EXAM:   Vital Signs Last 24 Hrs  T(C): 36.7 (10 Jul 2023 08:00), Max: 36.8 (10 Jul 2023 04:38)  T(F): 98.1 (10 Jul 2023 08:00), Max: 98.2 (10 Jul 2023 04:38)  HR: 81 (10 Jul 2023 08:00) (78 - 107)  BP: 128/87 (10 Jul 2023 08:00) (102/61 - 135/90)  BP(mean): --  RR: 18 (10 Jul 2023 08:00) (18 - 18)  SpO2: 98% (10 Jul 2023 08:00) (92% - 98%)    Parameters below as of 10 Jul 2023 08:00  Patient On (Oxygen Delivery Method): room air      General: No acute distress. Seen in chair.    NEUROLOGICAL EXAM:  Mental status: Awake, alert, oriented x3, speech fluent, follows commands, no neglect, normal memory   Cranial Nerves: No facial asymmetry, no nystagmus, no dysarthria,  tongue midline  Motor exam: Normal tone, no drift, 5/5 RUE, 5/5 RLE, 5/5 LUE, 5/5 LLE, normal fine finger movements.  Sensation: Intact to light touch   Coordination/ Gait: No dysmetria, gait not tested    LABS:                        13.0   10.64 )-----------( 277      ( 10 Jul 2023 06:59 )             39.5        PTT - ( 10 Jul 2023 06:59 )  PTT:65.6 sec        IMAGING: Reviewed by me.   CT Head No Cont (07.09.23 @ 11:31)   IMPRESSION:  Evolving acute/early subacute infarcts in the medial right temporal   lobe/right hippocampus and right cerebellar hemisphere are grossly stable   dating back to 07/02/2023.  No new intracranial findings.      MR Head No Cont (07.06.23 @ 09:56)   Small regions of restricted diffusion in the right mesial frontal lobe,   right thalamus, and right superior cerebellar hemisphere, consistent with   acute right PCA territory infarcts.    A few punctate foci of restricted diffusion in the left temporal lobe,   consistent with tiny acute left PCA territory infarcts.    Petechial hemorrhagic transformation involving the right mesial temporal   infarct.    CT Head No Cont (07.02.23 @ 14:07)   New focus of low density in the right superior cerebellar hemisphere   suggesting the presence of acute infarction (2-15). No david hemorrhagic   transformation.    CT head 7/1/23 12:49  IMPRESSION:  No acute intracranial bleeding.    7/2/23 13: 01  CT PERFUSION: Moderate sized right PCA ischemic penumbra involving the right  occipital lobe and posterior right mesial temporal lobe measuring  approximately 40 cc. No significant core infarction.  CTA BRAIN: *Near-complete occlusion of the right PCA at the P1/P2 junction,  corresponding to ischemic penumbra on concurrent perfusion..  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 No  associated calcification or thrombus.  CTA NECK: Patent cervical vasculature. No flow limiting stenosis or  occlusion.      TTE Echo Complete w/o Contrast w/ Doppler (07.03.23 @ 16:27)   Summary:   1. Technically difficult study.   2. Endocardial visualization was enhanced with intravenous echo contrast.   3. Normal left ventricular internal cavity size.   4. Normal global left ventricular systolic function.   5. Left ventricular ejection fraction, by visual estimation, is 60 to   65%.   6. Moderately enlarged left atrium.   7. Normal right atrial size.   8. Sclerotic aortic valve with normal opening.   9. Mild thickening and calcification of the anterior and posterior   mitral valve leaflets.  10. Mild mitral valve regurgitation.  11. Mild to moderate mitral annular calcification.  12. Mild-moderate tricuspid regurgitation.  13. Color flow doppler and intravenous injection of agitated saline is   limited due the study quality.  14. Trivial pericardial effusion.   Preliminary note, offical recommendations pending attending review/signature   [facility] Stroke Team  Progress Note     HPI:  This is a 62y Female HTN  last seen well at 1200 noon 7/2/23 . Developed Left sided numbness weakness. CT CTA CTP revealed L P!- P2 occlusion with no core and 40 ml penumbra. Patient was given tenecteplase at [facility] and transferred to [facility] for  thrombectomy and further care.  NIH 2 ICH 0 MRS 1    SUBJECTIVE:  Stated that she was unable to bear weight on her right leg. Stated that it feels like a "denny horse sensation" and she currently has some pain there today.No new neurologic complaints.  ROS reported negative unless otherwise noted. Reported episode of right lower extremity pain right below the knee anteriorly last night.    acetaminophen     Tablet .. 650 milliGRAM(s) Oral every 6 hours PRN  atorvastatin 40 milliGRAM(s) Oral at bedtime  chlorhexidine 2% Cloths 1 Application(s) Topical daily  diltiazem    milliGRAM(s) Oral daily  heparin  Infusion.  Unit(s)/Hr IV Continuous <Continuous>  hydrALAZINE Injectable 10 milliGRAM(s) IV Push every 4 hours PRN  metoprolol tartrate 100 milliGRAM(s) Oral every 12 hours  metoprolol tartrate Injectable 5 milliGRAM(s) IV Push every 6 hours PRN  ondansetron Injectable 4 milliGRAM(s) IV Push every 6 hours PRN  polyethylene glycol 3350 17 Gram(s) Oral daily  senna 2 Tablet(s) Oral at bedtime      PHYSICAL EXAM:   Vital Signs Last 24 Hrs  T(C): 36.7 (10 Jul 2023 08:00), Max: 36.8 (10 Jul 2023 04:38)  T(F): 98.1 (10 Jul 2023 08:00), Max: 98.2 (10 Jul 2023 04:38)  HR: 81 (10 Jul 2023 08:00) (78 - 107)  BP: 128/87 (10 Jul 2023 08:00) (102/61 - 135/90)  BP(mean): --  RR: 18 (10 Jul 2023 08:00) (18 - 18)  SpO2: 98% (10 Jul 2023 08:00) (92% - 98%)    Parameters below as of 10 Jul 2023 08:00  Patient On (Oxygen Delivery Method): room air      General: No acute distress. Seen in chair.    NEUROLOGICAL EXAM:  Mental status: Awake, alert, oriented x3, speech fluent, follows commands, no neglect, normal memory   Cranial Nerves: No facial asymmetry, no nystagmus, no dysarthria,  tongue midline  Motor exam: Normal tone, no drift, 5/5 RUE, 5/5 RLE, 5/5 LUE, 5/5 LLE, normal fine finger movements.  Sensation: Intact to light touch   Coordination/ Gait: No dysmetria, gait not tested    Groin site- warm dry and pulses are intact. Slight ecchymosis around site. Slight pain with palpation.     LABS:                        13.0   10.64 )-----------( 277      ( 10 Jul 2023 06:59 )             39.5        PTT - ( 10 Jul 2023 06:59 )  PTT:65.6 sec        IMAGING: Reviewed by me.   CT Head No Cont (07.09.23 @ 11:31)   IMPRESSION:  Evolving acute/early subacute infarcts in the medial right temporal   lobe/right hippocampus and right cerebellar hemisphere are grossly stable   dating back to 07/02/2023.  No new intracranial findings.      MR Head No Cont (07.06.23 @ 09:56)   Small regions of restricted diffusion in the right mesial frontal lobe,   right thalamus, and right superior cerebellar hemisphere, consistent with   acute right PCA territory infarcts.    A few punctate foci of restricted diffusion in the left temporal lobe,   consistent with tiny acute left PCA territory infarcts.    Petechial hemorrhagic transformation involving the right mesial temporal   infarct.    CT Head No Cont (07.02.23 @ 14:07)   New focus of low density in the right superior cerebellar hemisphere   suggesting the presence of acute infarction (2-15). No david hemorrhagic   transformation.    CT head 7/1/23 12:49  IMPRESSION:  No acute intracranial bleeding.    7/2/23 13: 01  CT PERFUSION: Moderate sized right PCA ischemic penumbra involving the right  occipital lobe and posterior right mesial temporal lobe measuring  approximately 40 cc. No significant core infarction.  CTA BRAIN: *Near-complete occlusion of the right PCA at the P1/P2 junction,  corresponding to ischemic penumbra on concurrent perfusion..  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 No  associated calcification or thrombus.  CTA NECK: Patent cervical vasculature. No flow limiting stenosis or  occlusion.      TTE Echo Complete w/o Contrast w/ Doppler (07.03.23 @ 16:27)   Summary:   1. Technically difficult study.   2. Endocardial visualization was enhanced with intravenous echo contrast.   3. Normal left ventricular internal cavity size.   4. Normal global left ventricular systolic function.   5. Left ventricular ejection fraction, by visual estimation, is 60 to   65%.   6. Moderately enlarged left atrium.   7. Normal right atrial size.   8. Sclerotic aortic valve with normal opening.   9. Mild thickening and calcification of the anterior and posterior   mitral valve leaflets.  10. Mild mitral valve regurgitation.  11. Mild to moderate mitral annular calcification.  12. Mild-moderate tricuspid regurgitation.  13. Color flow doppler and intravenous injection of agitated saline is   limited due the study quality.  14. Trivial pericardial effusion.   [facility] Stroke Team  Progress Note     HPI:  This is a 62y Female HTN  last seen well at 1200 noon 7/2/23 . Developed Left sided numbness weakness. CT CTA CTP revealed L P!- P2 occlusion with no core and 40 ml penumbra. Patient was given tenecteplase at [facility] and transferred to [facility] for  thrombectomy and further care.  NIH 2 ICH 0 MRS 1    SUBJECTIVE:  Stated that she was unable to bear weight on her right leg. Stated that it feels like a "denny horse sensation" and right lower extremity pain right below the knee anteriorly last night, she currently still has some pain there today. No new neurologic complaints.  ROS reported negative unless otherwise noted.     acetaminophen     Tablet .. 650 milliGRAM(s) Oral every 6 hours PRN  atorvastatin 40 milliGRAM(s) Oral at bedtime  chlorhexidine 2% Cloths 1 Application(s) Topical daily  diltiazem    milliGRAM(s) Oral daily  heparin  Infusion.  Unit(s)/Hr IV Continuous <Continuous>  hydrALAZINE Injectable 10 milliGRAM(s) IV Push every 4 hours PRN  metoprolol tartrate 100 milliGRAM(s) Oral every 12 hours  metoprolol tartrate Injectable 5 milliGRAM(s) IV Push every 6 hours PRN  ondansetron Injectable 4 milliGRAM(s) IV Push every 6 hours PRN  polyethylene glycol 3350 17 Gram(s) Oral daily  senna 2 Tablet(s) Oral at bedtime      PHYSICAL EXAM:   Vital Signs Last 24 Hrs  T(C): 36.7 (10 Jul 2023 08:00), Max: 36.8 (10 Jul 2023 04:38)  T(F): 98.1 (10 Jul 2023 08:00), Max: 98.2 (10 Jul 2023 04:38)  HR: 81 (10 Jul 2023 08:00) (78 - 107)  BP: 128/87 (10 Jul 2023 08:00) (102/61 - 135/90)  BP(mean): --  RR: 18 (10 Jul 2023 08:00) (18 - 18)  SpO2: 98% (10 Jul 2023 08:00) (92% - 98%)    Parameters below as of 10 Jul 2023 08:00  Patient On (Oxygen Delivery Method): room air      General: No acute distress. Seen in chair.    NEUROLOGICAL EXAM:  Mental status: Awake, alert, oriented x3, speech fluent, follows commands, no neglect, normal memory   Cranial Nerves: No facial asymmetry, no nystagmus, no dysarthria,  tongue midline  Motor exam: Normal tone, no drift, 5/5 RUE, 5/5 RLE, 5/5 LUE, 5/5 LLE, normal fine finger movements.  Sensation: Intact to light touch   Coordination/ Gait: No dysmetria, gait not tested    Groin site- warm dry and pulses are intact. Slight ecchymosis around site. Slight pain with palpation.     LABS:                        13.0   10.64 )-----------( 277      ( 10 Jul 2023 06:59 )             39.5        PTT - ( 10 Jul 2023 06:59 )  PTT:65.6 sec        IMAGING: Reviewed by me.   CT Head No Cont (07.09.23 @ 11:31)   IMPRESSION:  Evolving acute/early subacute infarcts in the medial right temporal   lobe/right hippocampus and right cerebellar hemisphere are grossly stable   dating back to 07/02/2023.  No new intracranial findings.      MR Head No Cont (07.06.23 @ 09:56)   Small regions of restricted diffusion in the right mesial frontal lobe,   right thalamus, and right superior cerebellar hemisphere, consistent with   acute right PCA territory infarcts.    A few punctate foci of restricted diffusion in the left temporal lobe,   consistent with tiny acute left PCA territory infarcts.    Petechial hemorrhagic transformation involving the right mesial temporal   infarct.    CT Head No Cont (07.02.23 @ 14:07)   New focus of low density in the right superior cerebellar hemisphere   suggesting the presence of acute infarction (2-15). No david hemorrhagic   transformation.    CT head 7/1/23 12:49  IMPRESSION:  No acute intracranial bleeding.    7/2/23 13: 01  CT PERFUSION: Moderate sized right PCA ischemic penumbra involving the right  occipital lobe and posterior right mesial temporal lobe measuring  approximately 40 cc. No significant core infarction.  CTA BRAIN: *Near-complete occlusion of the right PCA at the P1/P2 junction,  corresponding to ischemic penumbra on concurrent perfusion..  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 No  associated calcification or thrombus.  CTA NECK: Patent cervical vasculature. No flow limiting stenosis or  occlusion.      TTE Echo Complete w/o Contrast w/ Doppler (07.03.23 @ 16:27)   Summary:   1. Technically difficult study.   2. Endocardial visualization was enhanced with intravenous echo contrast.   3. Normal left ventricular internal cavity size.   4. Normal global left ventricular systolic function.   5. Left ventricular ejection fraction, by visual estimation, is 60 to   65%.   6. Moderately enlarged left atrium.   7. Normal right atrial size.   8. Sclerotic aortic valve with normal opening.   9. Mild thickening and calcification of the anterior and posterior   mitral valve leaflets.  10. Mild mitral valve regurgitation.  11. Mild to moderate mitral annular calcification.  12. Mild-moderate tricuspid regurgitation.  13. Color flow doppler and intravenous injection of agitated saline is   limited due the study quality.  14. Trivial pericardial effusion.

## 2023-07-10 NOTE — PROGRESS NOTE ADULT - ASSESSMENT
62 F with HTN transferred from [place] after acute onset of CVA. Was given TNK and transferred to [facility] where underwent a mechanical thrombectomy. Found to have Afib wtth RVR    Acute CVA   s/p TNK and [facility] thrombectomy  Continue ASA and Statin for now  Repeat CT head reviewed. Evolving MULTIPLE infarcts  d/w Neuro. Increase Heparin ggt to now target PTT 60-80    Lower ext pain  Dopplers done, neg for DVT  Arterial eval pending (due to recent groin access for thrombectomy)       Newly diagnosed Afib / HTN  With RVR  Rates now controlled  Continue Diltiazem 360 / Metoprolol  AC as above with plan for eventual transition to DOAC   Agree with Cardiology recommendation, should have sleep study as outpt  Discussed with the patient at length

## 2023-07-10 NOTE — PROGRESS NOTE ADULT - NS ATTEND AMEND GEN_ALL_CORE FT
Seen and examined with the ACP team. Plan discussed with ACPs.    I agree with assessment and plan  as written with modifications made above.    will follow with you    Narendra Mondragon MD PhD   [id number]
Seen and examined with the ACP team.  Plan discussed with ACPs.  I agree with as written above with modifications as below    Stroke  s/p thrombectomy  await MRI brain  incidental aneurysm, to follow up with Dr Zhou as outpatient  will follow with you    Narendra Mondragon MD PhD   [id number]
Seen and examined with the ACP team. Plan discussed with ACPs.    I agree with assessment and plan  as written with modifications made above.    will follow with you    Narendra Mondragon MD PhD   [id number]
seen with above,    62F history significant for obesity (BMI 34), HTN found with acute CVA (R-PCA) s/p TNK and thrombectomy and noted to be in Afib RVR, asymptomatic likely silent Afib for few months    -continue rate control changed to metoprolol 100mg BID and diltiazem 360mg, monitor if any longer duration of pauses >5 seconds, otherwise acceptable to continue current dosage.   -TTE LV EF 60-65% and moderate LA enlargement   - brain MRI with petechial hemorrhage, had repeat CT head today no bleeding but with diffuse embolic infarcts, will start Eliquis 5mg BID, CHADSVasc 3-4, will discontinue ASA 81mg   -defer rhythm control until able to tolerate long term AC, outpatient MIRANDA/CV and consider future elective ablation, seen by EP/Dr. Wiggins  -outpatient sleep study   -stable from cardiac standpoint for discharge planning       Elliot Mishra DO, Located within [facility]  Faculty Non-Invasive Cardiologist  [phone].
seen with above,    62F history significant for obesity (BMI 34), HTN found with acute CVA (R-PCA) s/p TNK and thrombectomy and noted to be in Afib RVR, asymptomatic likely silent Afib for few months  -continue rate control change to metoprolol 100mg BID and diltiazem 360mg  -TTE LV EF 60-65% and moderate LA enlargement    brain MRI with petechial hemorrhage and neurology recommend repeat CT head on 7/9 before decision if can start Eliquis 5mg BID, CHADSVasc 3-4, on ASA 81mg   -defer rhythm control until able to tolerate long term AC, outpatient MIRANDA/CV and consider future elective ablation, seen by EP/Dr. Wiggins  -outpatient sleep study       Elliot Mishra DO, [facility]  Faculty Non-Invasive Cardiologist  [phone]
seen with above,    62F history significant for obesity (BMI 34), HTN found with acute CVA (R-PCA) s/p TNK and thrombectomy and noted to be in Afib RVR, asymptomatic likely silent Afib for few months  -continue rate control increase metoprolol to 50mg q8hrs and on diltiazem  -TTE LV EF 60-65% and moderate LA enlargement   -pending brain MRI and neurology followup if stable then start Eliquis 5mg BID, CHADSVasc 3-4  -defer rhythm control until able to tolerate long term AC, consider future elective ablation   -outpatient sleep study       Elliot Mishra DO, [facility]  Faculty Non-Invasive Cardiologist  [phone].
seen with above,    62F history significant for obesity (BMI 34), HTN found with acute CVA (R-PCA) s/p TNK and thrombectomy and noted to be in Afib RVR, asymptomatic likely silent Afib for few months  -still with labile HR up to 130s, continue rate control on metoprolol to 50mg q8hrs and on diltiazem increase to 90mg q6hrs, once HR control change to long acting.   -TTE LV EF 60-65% and moderate LA enlargement   -pending brain MRI and neurology followup if stable then start Eliquis 5mg BID, CHADSVasc 3-4  -defer rhythm control until able to tolerate long term AC, outpatient MIRANDA/CV and consider future elective ablation, seen by EP/Dr. Wiggins  -outpatient sleep study       Elliot Mishra DO, [facility]  Faculty Non-Invasive Cardiologist  [phone]
seen with above,    62F history significant for obesity (BMI 34), HTN found with acute CVA s/p TNK and noted to be in Afib RVR, asymptomatic likely silent Afib for few months  -continue rate control increase metoprolol to 50mg q8hrs and on diltiazem  -TTE pending  -pending brain MRI if stable then start Eliquis 5mg BID, CHADSVasc 3-4  -defer rhythm control until able to tolerate AC, consider future elective ablation   -outpatient sleep study       Elliot Mishra DO, [facility]  Faculty Non-Invasive Cardiologist  [phone]

## 2023-07-11 LAB
ANION GAP SERPL CALC-SCNC: 10 MMOL/L — SIGNIFICANT CHANGE UP (ref 5–17)
APTT BLD: 58.3 SEC — HIGH (ref 27.5–35.5)
APTT BLD: 71.7 SEC — HIGH (ref 27.5–35.5)
BUN SERPL-MCNC: 21.9 MG/DL — HIGH (ref 8–20)
CALCIUM SERPL-MCNC: 9.2 MG/DL — SIGNIFICANT CHANGE UP (ref 8.4–10.5)
CHLORIDE SERPL-SCNC: 102 MMOL/L — SIGNIFICANT CHANGE UP (ref 96–108)
CO2 SERPL-SCNC: 26 MMOL/L — SIGNIFICANT CHANGE UP (ref 22–29)
CREAT SERPL-MCNC: 0.68 MG/DL — SIGNIFICANT CHANGE UP (ref 0.5–1.3)
EGFR: 97 ML/MIN/1.73M2 — SIGNIFICANT CHANGE UP
GLUCOSE SERPL-MCNC: 103 MG/DL — HIGH (ref 70–99)
HCT VFR BLD CALC: 37.2 % — SIGNIFICANT CHANGE UP (ref 34.5–45)
HGB BLD-MCNC: 12.4 G/DL — SIGNIFICANT CHANGE UP (ref 11.5–15.5)
MCHC RBC-ENTMCNC: 28.8 PG — SIGNIFICANT CHANGE UP (ref 27–34)
MCHC RBC-ENTMCNC: 33.3 GM/DL — SIGNIFICANT CHANGE UP (ref 32–36)
MCV RBC AUTO: 86.3 FL — SIGNIFICANT CHANGE UP (ref 80–100)
PLATELET # BLD AUTO: 289 K/UL — SIGNIFICANT CHANGE UP (ref 150–400)
POTASSIUM SERPL-MCNC: 4.4 MMOL/L — SIGNIFICANT CHANGE UP (ref 3.5–5.3)
POTASSIUM SERPL-SCNC: 4.4 MMOL/L — SIGNIFICANT CHANGE UP (ref 3.5–5.3)
RBC # BLD: 4.31 M/UL — SIGNIFICANT CHANGE UP (ref 3.8–5.2)
RBC # FLD: 13.5 % — SIGNIFICANT CHANGE UP (ref 10.3–14.5)
SODIUM SERPL-SCNC: 138 MMOL/L — SIGNIFICANT CHANGE UP (ref 135–145)
WBC # BLD: 11.29 K/UL — HIGH (ref 3.8–10.5)
WBC # FLD AUTO: 11.29 K/UL — HIGH (ref 3.8–10.5)

## 2023-07-11 PROCEDURE — 99232 SBSQ HOSP IP/OBS MODERATE 35: CPT

## 2023-07-11 RX ORDER — APIXABAN 2.5 MG/1
5 TABLET, FILM COATED ORAL EVERY 12 HOURS
Refills: 0 | Status: DISCONTINUED | OUTPATIENT
Start: 2023-07-11 | End: 2023-07-12

## 2023-07-11 RX ORDER — HEPARIN SODIUM 5000 [USP'U]/ML
1050 INJECTION INTRAVENOUS; SUBCUTANEOUS
Qty: 25000 | Refills: 0 | Status: DISCONTINUED | OUTPATIENT
Start: 2023-07-11 | End: 2023-07-11

## 2023-07-11 RX ADMIN — Medication 650 MILLIGRAM(S): at 09:55

## 2023-07-11 RX ADMIN — ATORVASTATIN CALCIUM 40 MILLIGRAM(S): 80 TABLET, FILM COATED ORAL at 20:21

## 2023-07-11 RX ADMIN — HEPARIN SODIUM 10 UNIT(S)/HR: 5000 INJECTION INTRAVENOUS; SUBCUTANEOUS at 03:06

## 2023-07-11 RX ADMIN — Medication 650 MILLIGRAM(S): at 08:55

## 2023-07-11 RX ADMIN — CHLORHEXIDINE GLUCONATE 1 APPLICATION(S): 213 SOLUTION TOPICAL at 05:02

## 2023-07-11 RX ADMIN — HEPARIN SODIUM 10.5 UNIT(S)/HR: 5000 INJECTION INTRAVENOUS; SUBCUTANEOUS at 07:30

## 2023-07-11 RX ADMIN — Medication 360 MILLIGRAM(S): at 05:02

## 2023-07-11 RX ADMIN — Medication 100 MILLIGRAM(S): at 05:02

## 2023-07-11 RX ADMIN — Medication 100 MILLIGRAM(S): at 17:01

## 2023-07-11 RX ADMIN — SENNA PLUS 2 TABLET(S): 8.6 TABLET ORAL at 20:20

## 2023-07-11 RX ADMIN — APIXABAN 5 MILLIGRAM(S): 2.5 TABLET, FILM COATED ORAL at 17:01

## 2023-07-11 RX ADMIN — HEPARIN SODIUM 10.5 UNIT(S)/HR: 5000 INJECTION INTRAVENOUS; SUBCUTANEOUS at 09:10

## 2023-07-11 RX ADMIN — HEPARIN SODIUM 10.5 UNIT(S)/HR: 5000 INJECTION INTRAVENOUS; SUBCUTANEOUS at 03:16

## 2023-07-11 RX ADMIN — HEPARIN SODIUM 10.5 UNIT(S)/HR: 5000 INJECTION INTRAVENOUS; SUBCUTANEOUS at 15:02

## 2023-07-11 NOTE — PROGRESS NOTE ADULT - ASSESSMENT
62 F with HTN transferred from [place] after acute onset of CVA. Was given TNK and transferred to [facility] where underwent a mechanical thrombectomy. Found to have Afib wtth RVR    Acute CVA   s/p TNK and [facility] thrombectomy  Continue ASA and Statin for now  Repeat CT head reviewed. Evolving MULTIPLE infarcts  Tolerating high PTT goal. Will dc Heparin at 17:00 and start Eliquis bid. If stable dsc home in am     Lower ext pain  Dopplers done, neg for DVT    Newly diagnosed Afib / HTN  With RVR  Rates now controlled  Continue Diltiazem 360 / Metoprolol  AC as above with plan for eventual transition to DOAC   Agree with Cardiology recommendation, should have sleep study as outpt  Discussed with the patient at length

## 2023-07-11 NOTE — PROGRESS NOTE ADULT - SUBJECTIVE AND OBJECTIVE BOX
[facility] Division of Hospital Medicine    Chief Complaint:  CVA     SUBJECTIVE / OVERNIGHT EVENTS:  Pt examined sitting up in chair   LLE discomfort better   No neurological changes in exam  Patient denies chest pain, SOB, abd pain, N/V, fever, chills, dysuria or any other complaints. All remainder ROS negative.     MEDICATIONS  (STANDING):  atorvastatin 40 milliGRAM(s) Oral at bedtime  chlorhexidine 2% Cloths 1 Application(s) Topical daily  diltiazem    milliGRAM(s) Oral daily  heparin  Infusion 1050 Unit(s)/Hr (10.5 mL/Hr) IV Continuous <Continuous>  metoprolol tartrate 100 milliGRAM(s) Oral every 12 hours  polyethylene glycol 3350 17 Gram(s) Oral daily  senna 2 Tablet(s) Oral at bedtime    MEDICATIONS  (PRN):  acetaminophen     Tablet .. 650 milliGRAM(s) Oral every 6 hours PRN Mild Pain (1 - 3), Moderate Pain (4 - 6)  hydrALAZINE Injectable 10 milliGRAM(s) IV Push every 4 hours PRN SBP>180  metoprolol tartrate Injectable 5 milliGRAM(s) IV Push every 6 hours PRN for sustain Afib >130  ondansetron Injectable 4 milliGRAM(s) IV Push every 6 hours PRN Nausea and/or Vomiting      I&O's Summary    07 Jul 2023 07:01  -  08 Jul 2023 07:00  --------------------------------------------------------  IN: 720 mL / OUT: 3 mL / NET: 717 mL        PHYSICAL EXAM:  Vital Signs Last 24 Hrs  T(C): 36.9 (11 Jul 2023 12:00), Max: 37.3 (10 Jul 2023 17:58)  T(F): 98.5 (11 Jul 2023 12:00), Max: 99.1 (10 Jul 2023 17:58)  HR: 85 (11 Jul 2023 12:00) (69 - 106)  BP: 115/77 (11 Jul 2023 12:00) (109/74 - 150/90)  BP(mean): --  RR: 18 (11 Jul 2023 12:00) (18 - 18)  SpO2: 98% (11 Jul 2023 12:00) (94% - 100%)    Parameters below as of 11 Jul 2023 12:00  Patient On (Oxygen Delivery Method): room air          CONSTITUTIONAL: Non toxic appearing, lying in bed  ENMT: Moist oral mucosa, no pharyngeal injection or exudates; normal dentition  RESPIRATORY: Normal respiratory effort; lungs are clear to auscultation bilaterally  CARDIOVASCULAR: Regular rate and rhythm, normal S1 and S2, no murmur/rub/gallop; No lower extremity edema; Peripheral pulses are 2+ bilaterally  ABDOMEN: Nontender to palpation, normoactive bowel sounds, no rebound/guarding; No hepatosplenomegaly  MUSCLOSKELETAL:  Normal gait; no clubbing or cyanosis of digits; no joint swelling or tenderness to palpation  PSYCH: A+O to person, place, and time; affect appropriate  NEUROLOGY: CN 2-12 are intact and symmetric; no gross sensory deficits;   SKIN: No rashes; no palpable lesions    LABS:                                   12.4   11.29 )-----------( 289      ( 11 Jul 2023 01:24 )             37.2   07-11    138  |  102  |  21.9<H>  ----------------------------<  103<H>  4.4   |  26.0  |  0.68    Ca    9.2      11 Jul 2023 01:24          Urinalysis Basic - ( 08 Jul 2023 06:15 )    Color: x / Appearance: x / SG: x / pH: x  Gluc: 100 mg/dL / Ketone: x  / Bili: x / Urobili: x   Blood: x / Protein: x / Nitrite: x   Leuk Esterase: x / RBC: x / WBC x   Sq Epi: x / Non Sq Epi: x / Bacteria: x        CAPILLARY BLOOD GLUCOSE            RADIOLOGY & ADDITIONAL TESTS:    7/9/23 CT Head   IMPRESSION:  Evolving acute/early subacute infarcts in the medial right temporal lobe/right hippocampus and right cerebellar hemisphere are grossly stable dating back to 07/02/2023.  No new intracranial findings.

## 2023-07-11 NOTE — CHART NOTE - NSCHARTNOTEFT_GEN_A_CORE
Source: Patient [X ]  Family [ ]   other [ ]  62 F with HTN transferred from [place] after acute onset of CVA. Was given TNK and transferred to [facility] where underwent a mechanical thrombectomy. Found to have Afib wtth RVR    Current Diet: Diet, Regular (07-01-23 @ 22:46)    PO intake:  < 50% [ ]   50-75%  [ ]   %  [X ]  other :    Source for PO intake [X ] Patient [ ] family [X ] chart [ ] staff [ ] other    Current Weight:   (7/11) 218.2lbs   (7/10) 220.2  (7/8) 215.1  (7/7) 208.7  (7/3) 235.8    % Weight Change +1 generalized edema effecting weight status     Pertinent Medications: MEDICATIONS  (STANDING):  atorvastatin 40 milliGRAM(s) Oral at bedtime  chlorhexidine 2% Cloths 1 Application(s) Topical daily  diltiazem    milliGRAM(s) Oral daily  heparin  Infusion 1050 Unit(s)/Hr (10.5 mL/Hr) IV Continuous <Continuous>  metoprolol tartrate 100 milliGRAM(s) Oral every 12 hours  polyethylene glycol 3350 17 Gram(s) Oral daily  senna 2 Tablet(s) Oral at bedtime    MEDICATIONS  (PRN):  acetaminophen     Tablet .. 650 milliGRAM(s) Oral every 6 hours PRN Mild Pain (1 - 3), Moderate Pain (4 - 6)  hydrALAZINE Injectable 10 milliGRAM(s) IV Push every 4 hours PRN SBP>180  metoprolol tartrate Injectable 5 milliGRAM(s) IV Push every 6 hours PRN for sustain Afib >130  ondansetron Injectable 4 milliGRAM(s) IV Push every 6 hours PRN Nausea and/or Vomiting    Pertinent Labs: CBC Full  -  ( 11 Jul 2023 01:24 )  WBC Count : 11.29 K/uL  RBC Count : 4.31 M/uL  Hemoglobin : 12.4 g/dL  Hematocrit : 37.2 %  Platelet Count - Automated : 289 K/uL  Mean Cell Volume : 86.3 fl  Mean Cell Hemoglobin : 28.8 pg  Mean Cell Hemoglobin Concentration : 33.3 gm/dL  07-11 Na138 mmol/L Glu 103 mg/dL<H> K+ 4.4 mmol/L Cr  0.68 mg/dL BUN 21.9 mg/dL<H> Phos n/a   Alb n/a   PAB n/a       Nutrition focused physical exam conducted - found signs of malnutrition [X ]absent [ ]present    Subcutaneous fat loss: [ ] Orbital fat pads region, [ ]Buccal fat region, [ ]Triceps region,  [ ]Ribs region    Muscle wasting: [ ]Temples region, [ ]Clavicle region, [ ]Shoulder region, [ ]Scapula region, [ ]Interosseous region,  [ ]thigh region, [ ]Calf region    Estimated Needs:   [X ] no change since previous assessment  [ ] recalculated:     Current Nutrition Diagnosis: Pt remains at nutrition risk secondary to  Increased Nutrient Needs Protein-energy Related to increased physiologic demand for nutrient as evidenced by s/p CVA. Pt states she is eating well. has a good appetite, no complaints of N/V/D/C. Pt was sitting in chair in room unable to obtain bed scale weight. RD to remain available     Recommendations: MVI daily     Monitoring and Evaluation:   [ ] PO intake [ ] Tolerance to diet prescription [X] Weights  [X] Follow up per protocol [X] Labs:

## 2023-07-11 NOTE — PROGRESS NOTE ADULT - PROVIDER SPECIALTY LIST ADULT
Hospitalist
Internal Medicine
Neurology
Cardiology
Hospitalist
Hospitalist
Internal Medicine
NSICU
Intervent Radiology
Neurology
Neurology
Cardiology

## 2023-07-11 NOTE — PROGRESS NOTE ADULT - REASON FOR ADMISSION
stroke with thrombus

## 2023-07-12 ENCOUNTER — TRANSCRIPTION ENCOUNTER (OUTPATIENT)
Age: 63
End: 2023-07-12

## 2023-07-12 VITALS
SYSTOLIC BLOOD PRESSURE: 138 MMHG | TEMPERATURE: 98 F | RESPIRATION RATE: 18 BRPM | DIASTOLIC BLOOD PRESSURE: 96 MMHG | OXYGEN SATURATION: 97 % | HEART RATE: 103 BPM

## 2023-07-12 LAB
HCT VFR BLD CALC: 41.7 % — SIGNIFICANT CHANGE UP (ref 34.5–45)
HGB BLD-MCNC: 13.5 G/DL — SIGNIFICANT CHANGE UP (ref 11.5–15.5)
MCHC RBC-ENTMCNC: 28.5 PG — SIGNIFICANT CHANGE UP (ref 27–34)
MCHC RBC-ENTMCNC: 32.4 GM/DL — SIGNIFICANT CHANGE UP (ref 32–36)
MCV RBC AUTO: 88 FL — SIGNIFICANT CHANGE UP (ref 80–100)
PLATELET # BLD AUTO: 273 K/UL — SIGNIFICANT CHANGE UP (ref 150–400)
RBC # BLD: 4.74 M/UL — SIGNIFICANT CHANGE UP (ref 3.8–5.2)
RBC # FLD: 13.5 % — SIGNIFICANT CHANGE UP (ref 10.3–14.5)
WBC # BLD: 9.68 K/UL — SIGNIFICANT CHANGE UP (ref 3.8–10.5)
WBC # FLD AUTO: 9.68 K/UL — SIGNIFICANT CHANGE UP (ref 3.8–10.5)

## 2023-07-12 PROCEDURE — 92523 SPEECH SOUND LANG COMPREHEN: CPT

## 2023-07-12 PROCEDURE — 80048 BASIC METABOLIC PNL TOTAL CA: CPT

## 2023-07-12 PROCEDURE — 61645 PERQ ART M-THROMBECT &/NFS: CPT

## 2023-07-12 PROCEDURE — 97163 PT EVAL HIGH COMPLEX 45 MIN: CPT

## 2023-07-12 PROCEDURE — C1769: CPT

## 2023-07-12 PROCEDURE — C1757: CPT

## 2023-07-12 PROCEDURE — 84484 ASSAY OF TROPONIN QUANT: CPT

## 2023-07-12 PROCEDURE — 87640 STAPH A DNA AMP PROBE: CPT

## 2023-07-12 PROCEDURE — 80061 LIPID PANEL: CPT

## 2023-07-12 PROCEDURE — 99239 HOSP IP/OBS DSCHRG MGMT >30: CPT

## 2023-07-12 PROCEDURE — 84436 ASSAY OF TOTAL THYROXINE: CPT

## 2023-07-12 PROCEDURE — 85730 THROMBOPLASTIN TIME PARTIAL: CPT

## 2023-07-12 PROCEDURE — 76380 CAT SCAN FOLLOW-UP STUDY: CPT

## 2023-07-12 PROCEDURE — 83036 HEMOGLOBIN GLYCOSYLATED A1C: CPT

## 2023-07-12 PROCEDURE — 84443 ASSAY THYROID STIM HORMONE: CPT

## 2023-07-12 PROCEDURE — C1894: CPT

## 2023-07-12 PROCEDURE — C1760: CPT

## 2023-07-12 PROCEDURE — 85027 COMPLETE CBC AUTOMATED: CPT

## 2023-07-12 PROCEDURE — 93306 TTE W/DOPPLER COMPLETE: CPT

## 2023-07-12 PROCEDURE — 36415 COLL VENOUS BLD VENIPUNCTURE: CPT

## 2023-07-12 PROCEDURE — 86803 HEPATITIS C AB TEST: CPT

## 2023-07-12 PROCEDURE — 83735 ASSAY OF MAGNESIUM: CPT

## 2023-07-12 PROCEDURE — 84480 ASSAY TRIIODOTHYRONINE (T3): CPT

## 2023-07-12 PROCEDURE — 84100 ASSAY OF PHOSPHORUS: CPT

## 2023-07-12 PROCEDURE — 93970 EXTREMITY STUDY: CPT

## 2023-07-12 PROCEDURE — 87641 MR-STAPH DNA AMP PROBE: CPT

## 2023-07-12 PROCEDURE — 82962 GLUCOSE BLOOD TEST: CPT

## 2023-07-12 PROCEDURE — 93926 LOWER EXTREMITY STUDY: CPT

## 2023-07-12 PROCEDURE — 85362 FIBRIN DEGRADATION PRODUCTS: CPT

## 2023-07-12 PROCEDURE — 70551 MRI BRAIN STEM W/O DYE: CPT

## 2023-07-12 PROCEDURE — 70450 CT HEAD/BRAIN W/O DYE: CPT

## 2023-07-12 PROCEDURE — 93005 ELECTROCARDIOGRAM TRACING: CPT

## 2023-07-12 PROCEDURE — C1887: CPT

## 2023-07-12 RX ORDER — BISOPROLOL FUMARATE 10 MG/1
1 TABLET, FILM COATED ORAL
Refills: 0 | DISCHARGE

## 2023-07-12 RX ORDER — AMLODIPINE BESYLATE 2.5 MG/1
1 TABLET ORAL
Refills: 0 | DISCHARGE

## 2023-07-12 RX ORDER — DILTIAZEM HCL 120 MG
1 CAPSULE, EXT RELEASE 24 HR ORAL
Qty: 30 | Refills: 0
Start: 2023-07-12 | End: 2023-08-10

## 2023-07-12 RX ORDER — APIXABAN 2.5 MG/1
1 TABLET, FILM COATED ORAL
Qty: 60 | Refills: 0
Start: 2023-07-12 | End: 2023-08-10

## 2023-07-12 RX ORDER — METOPROLOL TARTRATE 50 MG
1 TABLET ORAL
Qty: 60 | Refills: 0
Start: 2023-07-12 | End: 2023-08-10

## 2023-07-12 RX ORDER — LOSARTAN/HYDROCHLOROTHIAZIDE 100MG-25MG
1 TABLET ORAL
Refills: 0 | DISCHARGE

## 2023-07-12 RX ORDER — ATORVASTATIN CALCIUM 80 MG/1
1 TABLET, FILM COATED ORAL
Qty: 30 | Refills: 0
Start: 2023-07-12 | End: 2023-08-10

## 2023-07-12 RX ADMIN — Medication 360 MILLIGRAM(S): at 05:27

## 2023-07-12 RX ADMIN — Medication 100 MILLIGRAM(S): at 05:27

## 2023-07-12 RX ADMIN — Medication 100 MILLIGRAM(S): at 17:25

## 2023-07-12 RX ADMIN — CHLORHEXIDINE GLUCONATE 1 APPLICATION(S): 213 SOLUTION TOPICAL at 05:27

## 2023-07-12 RX ADMIN — APIXABAN 5 MILLIGRAM(S): 2.5 TABLET, FILM COATED ORAL at 17:18

## 2023-07-12 RX ADMIN — APIXABAN 5 MILLIGRAM(S): 2.5 TABLET, FILM COATED ORAL at 05:27

## 2023-07-12 NOTE — DISCHARGE NOTE PROVIDER - NSDCFUSCHEDAPPT_GEN_ALL_CORE_FT
Ny Buchanan  [facility]  [facility] [address]  Scheduled Appointment: 07/18/2023

## 2023-07-12 NOTE — DISCHARGE NOTE PROVIDER - PROVIDER TOKENS
PROVIDER:[TOKEN:[[id number]],FOLLOWUP:[1 month]],PROVIDER:[TOKEN:[[id number]],FOLLOWUP:[2 weeks]] PROVIDER:[TOKEN:[[id number]],FOLLOWUP:[1 month]],PROVIDER:[TOKEN:[[id number]],FOLLOWUP:[2 weeks]],PROVIDER:[TOKEN:[[id number]],FOLLOWUP:[2 weeks]]

## 2023-07-12 NOTE — DISCHARGE NOTE PROVIDER - HOSPITAL COURSE
62 F with HTN transferred from [place] after acute onset of CVA. Was given TNK and transferred to [facility] where underwent a mechanical thrombectomy 7/1. Found to have Afib with RVR. With stable repeat brain imaging was slowly started on AC with heparin ggt. Gradually increased and has now been transitioned to DOAC therapy.   Medically optimized, planned for dsc home

## 2023-07-12 NOTE — DISCHARGE NOTE PROVIDER - CARE PROVIDER_API CALL
Narendra Mondragon  Neurology  [address], [address]  [place] [address]  Phone: [phone]  Fax: [phone]  Follow Up Time: 1 month    Elliot Mishra  Cardiovascular Disease  [address] [facility], [address] [address]  Phone: [phone]  Fax: [phone]  Follow Up Time: 2 weeks   Narendra Mondragon  Neurology  [address], [address]  [place] [address]  Phone: [phone]  Fax: [phone]  Follow Up Time: 1 month    Elliot Mishra  Cardiovascular Disease  [address] [id number]  Phone: [phone]  Fax: [phone]  Follow Up Time: 2 weeks    John Wiggins  Cardiac Electrophysiology  [address] [id number]  Phone: [phone]  Fax: [phone]  Follow Up Time: 2 weeks

## 2023-07-12 NOTE — DISCHARGE NOTE PROVIDER - NSDCCPCAREPLAN_GEN_ALL_CORE_FT
PRINCIPAL DISCHARGE DIAGNOSIS  Diagnosis: Acute CVA (cerebrovascular accident)  Assessment and Plan of Treatment: Follow up with Neurology.      SECONDARY DISCHARGE DIAGNOSES  Diagnosis: Atrial fibrillation  Assessment and Plan of Treatment: Follow up with your Cardiologist within 1-2 weeks.

## 2023-07-12 NOTE — DISCHARGE NOTE PROVIDER - NSDCMRMEDTOKEN_GEN_ALL_CORE_FT
apixaban 5 mg oral tablet: 1 tab(s) orally every 12 hours  atorvastatin 40 mg oral tablet: 1 tab(s) orally once a day (at bedtime)  dilTIAZem 360 mg/24 hours oral capsule, extended release: 1 cap(s) orally once a day  Metoprolol Tartrate 100 mg oral tablet: 1 tab(s) orally every 12 hours

## 2023-07-13 ENCOUNTER — TRANSCRIPTION ENCOUNTER (OUTPATIENT)
Age: 63
End: 2023-07-13

## 2023-07-13 ENCOUNTER — NON-APPOINTMENT (OUTPATIENT)
Age: 63
End: 2023-07-13

## 2023-07-13 PROBLEM — I10 ESSENTIAL (PRIMARY) HYPERTENSION: Chronic | Status: ACTIVE | Noted: 2023-07-01

## 2023-07-13 RX ORDER — BISOPROLOL FUMARATE 10 MG/1
10 TABLET, FILM COATED ORAL DAILY
Qty: 30 | Refills: 5 | Status: DISCONTINUED | COMMUNITY
Start: 2018-07-28 | End: 2023-07-13

## 2023-07-13 RX ORDER — AMLODIPINE BESYLATE 2.5 MG/1
2.5 TABLET ORAL
Qty: 60 | Refills: 5 | Status: DISCONTINUED | COMMUNITY
Start: 2020-02-12 | End: 2023-07-13

## 2023-07-13 RX ORDER — LOSARTAN POTASSIUM AND HYDROCHLOROTHIAZIDE 25; 100 MG/1; MG/1
100-25 TABLET ORAL
Qty: 30 | Refills: 5 | Status: DISCONTINUED | COMMUNITY
Start: 1900-01-01 | End: 2023-07-13

## 2023-07-18 ENCOUNTER — APPOINTMENT (OUTPATIENT)
Dept: FAMILY MEDICINE | Facility: CLINIC | Age: 63
End: 2023-07-18
Payer: COMMERCIAL

## 2023-07-18 VITALS
OXYGEN SATURATION: 98 % | HEIGHT: 66 IN | SYSTOLIC BLOOD PRESSURE: 112 MMHG | RESPIRATION RATE: 12 BRPM | WEIGHT: 230 LBS | HEART RATE: 70 BPM | BODY MASS INDEX: 36.96 KG/M2 | DIASTOLIC BLOOD PRESSURE: 70 MMHG

## 2023-07-18 DIAGNOSIS — I48.91 UNSPECIFIED ATRIAL FIBRILLATION: ICD-10-CM

## 2023-07-18 PROCEDURE — 99496 TRANSJ CARE MGMT HIGH F2F 7D: CPT

## 2023-07-18 RX ORDER — CHOLECALCIFEROL (VITAMIN D3) 25 MCG
TABLET ORAL
Refills: 0 | Status: DISCONTINUED | COMMUNITY
End: 2023-07-18

## 2023-07-18 RX ORDER — MULTIVIT-MIN/IRON/FOLIC ACID/K 18-600-40
CAPSULE ORAL
Refills: 0 | Status: DISCONTINUED | COMMUNITY
End: 2023-07-18

## 2023-07-18 RX ORDER — NALTREXONE HYDROCHLORIDE 50 MG/1
50 TABLET, FILM COATED ORAL
Qty: 30 | Refills: 2 | Status: DISCONTINUED | COMMUNITY
Start: 2022-07-30 | End: 2023-07-18

## 2023-07-18 RX ORDER — MAGNESIUM OXIDE/MAG AA CHELATE 300 MG
CAPSULE ORAL
Refills: 0 | Status: DISCONTINUED | COMMUNITY
End: 2023-07-18

## 2023-07-18 RX ORDER — LACTOBACILLUS ACIDOPHILUS/PECT 30 MG-20MG
TABLET ORAL
Refills: 0 | Status: DISCONTINUED | COMMUNITY
End: 2023-07-18

## 2023-07-18 RX ORDER — METHOCARBAMOL 500 MG/1
500 TABLET, FILM COATED ORAL
Qty: 90 | Refills: 0 | Status: DISCONTINUED | COMMUNITY
Start: 2022-07-28 | End: 2023-07-18

## 2023-07-18 NOTE — HISTORY OF PRESENT ILLNESS
[Post-hospitalization from ___ Hospital] : Post-hospitalization from [unfilled] Hospital [Admitted on: ___] : The patient was admitted on [unfilled] [Discharged on ___] : discharged on [unfilled] [Discharge Summary] : discharge summary [Pertinent Labs] : pertinent labs [Radiology Findings] : radiology findings [Discharge Med List] : discharge medication list [Med Reconciliation] : medication reconciliation has been completed [Patient Contacted By: ____] : and contacted by [unfilled] [FreeTextEntry2] : On 7/1 she had numbness in her lip and she went to [facility] and she diagnosed with a stroke and received TPA.  She was transferred to [facility] and had a cerebral angiogram and was found to have a thrombus and had a thrombectomy.  She was also in rapid A fib. She feels fatigued but otherwise has no weakness or difficulty speaking.  She is scheduled for follow up visits with neurology and cardiology

## 2023-07-18 NOTE — PLAN
[FreeTextEntry1] : the hospital records were reviewed, she was given emotional support and will follow up with specialists as scheduled and will follow up with me in 8 weeks or sooner prn

## 2023-07-18 NOTE — REVIEW OF SYSTEMS
[Fatigue] : fatigue [Anxiety] : anxiety [Negative] : Heme/Lymph [Suicidal] : not suicidal [Insomnia] : no insomnia [Depression] : no depression [de-identified] : numbness of lip

## 2023-07-18 NOTE — PHYSICAL EXAM
Patient [No Acute Distress] : no acute distress [Well Nourished] : well nourished [Well Developed] : well developed [Well-Appearing] : well-appearing [No Respiratory Distress] : no respiratory distress  [No Accessory Muscle Use] : no accessory muscle use [Clear to Auscultation] : lungs were clear to auscultation bilaterally [No Edema] : there was no peripheral edema [Coordination Grossly Intact] : coordination grossly intact [de-identified] : irregularly irregular [de-identified] : CN 2 - 12 intact [de-identified] : she is tearful

## 2023-07-20 ENCOUNTER — TRANSCRIPTION ENCOUNTER (OUTPATIENT)
Age: 63
End: 2023-07-20

## 2023-07-23 ENCOUNTER — TRANSCRIPTION ENCOUNTER (OUTPATIENT)
Age: 63
End: 2023-07-23

## 2023-07-24 ENCOUNTER — NON-APPOINTMENT (OUTPATIENT)
Age: 63
End: 2023-07-24

## 2023-07-27 ENCOUNTER — APPOINTMENT (OUTPATIENT)
Dept: CARDIOLOGY | Facility: CLINIC | Age: 63
End: 2023-07-27
Payer: COMMERCIAL

## 2023-07-27 ENCOUNTER — NON-APPOINTMENT (OUTPATIENT)
Age: 63
End: 2023-07-27

## 2023-07-27 VITALS
HEART RATE: 76 BPM | OXYGEN SATURATION: 98 % | WEIGHT: 227 LBS | HEIGHT: 66 IN | RESPIRATION RATE: 16 BRPM | BODY MASS INDEX: 36.48 KG/M2 | DIASTOLIC BLOOD PRESSURE: 78 MMHG | SYSTOLIC BLOOD PRESSURE: 124 MMHG

## 2023-07-27 VITALS — DIASTOLIC BLOOD PRESSURE: 74 MMHG | SYSTOLIC BLOOD PRESSURE: 118 MMHG

## 2023-07-27 PROCEDURE — 93000 ELECTROCARDIOGRAM COMPLETE: CPT

## 2023-07-27 PROCEDURE — 99215 OFFICE O/P EST HI 40 MIN: CPT

## 2023-07-27 RX ORDER — FLUTICASONE PROPIONATE 50 UG/1
50 SPRAY, METERED NASAL DAILY
Qty: 1 | Refills: 3 | Status: COMPLETED | COMMUNITY
Start: 2022-03-21 | End: 2023-07-27

## 2023-07-27 RX ORDER — ESTRADIOL 0.1 MG/G
0.1 CREAM VAGINAL
Qty: 1 | Refills: 5 | Status: COMPLETED | COMMUNITY
Start: 2020-01-16 | End: 2023-07-27

## 2023-07-27 NOTE — CARDIOLOGY SUMMARY
[de-identified] : 7/27/23- Afib 79, normal axis, QTc 417 [de-identified] : 7/3/23- Summary:\par  1. Technically difficult study.\par  2. Endocardial visualization was enhanced with intravenous echo contrast.\par  3. Normal left ventricular internal cavity size.\par  4. Normal global left ventricular systolic function.\par  5. Left ventricular ejection fraction, by visual estimation, is 60 to \par 65%.\par  6. Moderately enlarged left atrium.\par  7. Normal right atrial size.\par  8. Sclerotic aortic valve with normal opening.\par  9. Mild thickening and calcification of the anterior and posterior \par mitral valve leaflets.\par 10. Mild mitral valve regurgitation.\par 11. Mild to moderate mitral annular calcification.\par 12. Mild-moderate tricuspid regurgitation.\par 13. Color flow doppler and intravenous injection of agitated saline is \par limited due the study quality.\par 14. Trivial pericardial effusion.

## 2023-07-27 NOTE — DISCUSSION/SUMMARY
[FreeTextEntry1] : 63F history significant for obesity (BMI 34), HTN, found with acute CVA (R-PCA) s/p TNK at [facility] and transferred to [facility] 7/1/23 for thrombectomy and noted to be in Afib RVR, asymptomatic likely silent Afib for few months, TTE LV EF 60-65% and moderate LA enlargement, difficult to rate control required high dose metoprolol 100mg BID and diltiazem 360mg, CT head with diffuse embolic infarcts, brain MRI with petechial hemorrhage, CHADSVasc 3-4, challenged on heparin gtt with stable CT head and transition to Eliquis 5mg BID, defer rhythm control until able to tolerate long term AC, outpatient MIRANDA/CV and consider future elective ablation, seen by EP/Dr. Wiggins, need outpatient sleep study, presents for general cardiology follow up. \par \par Symptoms of fatigue likely from symptomatic persistent Afib, EKG rate controlled, will obtain pharm nuclear stress test for ischemic evaluation, if normal then arrange for MIRANDA/CV, follow up with Dr. Wiggins to plan for future elective Afib ablation. Referral for sleep study. \par \par \par \par 1. Persistent Afib\par -rate controlled on metoprolol and diltiazem\par -CHADVasc 5 with embolic stroke- continue Eliquis and need bridging when off med for any procedure. \par \par 2. HTN\par -BP at goal on current AV lanette blockers \par -recent LDL 91, remains on atorvastatin 40mg \par \par 2. Embolic CVA with petechial  hemorrhage\par -follow up with neurosurgery and neurology\par -anxiety since recent stroke, consider SSRI use? \par \par 3. Obesity\par -need weight loss\par -pending sleep study. \par \par \par Patient continues to remain out from work for another 2 months. \par \par Follow up in 2 months or if stress test is normal then can see only Dr. Wiggins going forward.  [EKG obtained to assist in diagnosis and management of assessed problem(s)] : EKG obtained to assist in diagnosis and management of assessed problem(s)

## 2023-07-27 NOTE — HISTORY OF PRESENT ILLNESS
[FreeTextEntry1] : 63F history significant for obesity (BMI 34), HTN, found with acute CVA (R-PCA) s/p TNK at [facility] and transferred to [facility] 7/1/23 for thrombectomy and noted to be in Afib RVR, asymptomatic likely silent Afib for few months, TTE LV EF 60-65% and moderate LA enlargement, difficult to rate control required high dose metoprolol 100mg BID and diltiazem 360mg, CT head with diffuse embolic infarcts, brain MRI with petechial hemorrhage, CHADSVasc 3-4, challenged on heparin gtt with stable CT head and transition to Eliquis 5mg BID, defer rhythm control until able to tolerate long term AC, outpatient MIRANDA/CV and consider future elective ablation, seen by EP/Dr. Wiggins, need outpatient sleep study, presents for general cardiology follow up. \par \par Reports feeling well except has been feeling fatigue, denies chest pain or shortness of breath with exertion, no palpitations. Tolerating Eliquis use without bleeding or fall. 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 Has been anxious since her episode of hospitalization, remains out of work. Pending to see EP/Dr. Wiggins for follow up.

## 2023-07-27 NOTE — PHYSICAL EXAM

## 2023-08-01 ENCOUNTER — APPOINTMENT (OUTPATIENT)
Dept: FAMILY MEDICINE | Facility: CLINIC | Age: 63
End: 2023-08-01
Payer: COMMERCIAL

## 2023-08-01 VITALS
RESPIRATION RATE: 14 BRPM | BODY MASS INDEX: 36.48 KG/M2 | SYSTOLIC BLOOD PRESSURE: 148 MMHG | TEMPERATURE: 98.3 F | WEIGHT: 227 LBS | DIASTOLIC BLOOD PRESSURE: 84 MMHG | HEIGHT: 66 IN | OXYGEN SATURATION: 99 % | HEART RATE: 70 BPM

## 2023-08-01 VITALS — SYSTOLIC BLOOD PRESSURE: 114 MMHG | DIASTOLIC BLOOD PRESSURE: 70 MMHG

## 2023-08-01 PROCEDURE — 99213 OFFICE O/P EST LOW 20 MIN: CPT

## 2023-08-01 NOTE — PLAN
[FreeTextEntry1] : I elected not to prescribe muscle relaxers due to the recent brain injury, she will take Tylenol 500-1000 mg every 6 hours as needed and was advised to apply ice alternating with heat to the lower back and to follow up if the symptoms persist or worsen.  She was given emotional support and will follow up with neurology and cardiology as scheduled.  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

## 2023-08-01 NOTE — REVIEW OF SYSTEMS
[Fatigue] : fatigue [Muscle Pain] : muscle pain [Back Pain] : back pain [Suicidal] : not suicidal [Depression] : depression [Negative] : Heme/Lymph

## 2023-08-01 NOTE — HISTORY OF PRESENT ILLNESS
[facility] - S Alondra / Willa S /D Mark/ VIVI Dinero/ VIVI Bill/ Marco Jimenez / ABDOULAYE Nick/ O Joseph  service -[phone], office [phone]  ---------------------------------------------------------------------------------------------------------------    Patient is a 73y Female whom presented to the hospital with   Denied recent NSAID use/Abx use/iv contrast studies.    Patient seen and examined    PAST MEDICAL & SURGICAL HISTORY:  HTN (hypertension)      HLD (hyperlipidemia)      Gout      GI bleed      Posterior circulation stroke      CVA (cerebrovascular accident)      Stage 4 chronic kidney disease      Anemia  last transfusion  ,      Diabetes mellitus treated with insulin      Diverticulitis      S/P hip replacement      S/P lumpectomy, right breast      S/P knee replacement, bilateral      Allergies: aspirin (Vomiting)  sulfa drugs (Flushing)  sulfADIAZINE (Rash)    Home Medications Reviewed  Hospital Medications:   MEDICATIONS  (STANDING):  dextrose 5%. 1000 milliLiter(s) (100 mL/Hr) IV Continuous <Continuous>  dextrose 5%. 1000 milliLiter(s) (50 mL/Hr) IV Continuous <Continuous>  dextrose 50% Injectable 25 Gram(s) IV Push once  dextrose 50% Injectable 12.5 Gram(s) IV Push once  dextrose 50% Injectable 25 Gram(s) IV Push once  glucagon  Injectable 1 milliGRAM(s) IntraMuscular once  insulin lispro (ADMELOG) corrective regimen sliding scale   SubCutaneous three times a day before meals  insulin lispro (ADMELOG) corrective regimen sliding scale   SubCutaneous at bedtime    SOCIAL HISTORY:  Denies ETOh,Smoking, illicit drug use  FAMILY HISTORY:  Family history of diabetes mellitus (Grandparent)        REVIEW OF SYSTEMS:  CONSTITUTIONAL: No weakness, fevers or chills  EYES/ENT: No visual changes;  No vertigo or throat pain   NECK: No pain or stiffness  RESPIRATORY: No cough, wheezing, hemoptysis; No shortness of breath  CARDIOVASCULAR: No chest pain or palpitations.  GASTROINTESTINAL: No abdominal or epigastric pain. No nausea, vomiting, or hematemesis; No diarrhea or constipation. No melena or hematochezia.  GENITOURINARY: No dysuria, frequency, foamy urine, urinary urgency, incontinence or hematuria  NEUROLOGICAL: No numbness or weakness  SKIN: No itching, burning, rashes, or lesions   VASCULAR: No bilateral lower extremity edema.   All other review of systems is negative unless indicated above.    VITALS:  T(F): 97.2 (22 @ 17:48), Max: 98.2 (22 @ 11:48)  HR: 92 (22 @ 19:19)  BP: 154/63 (22 @ 19:19)  RR: 18 (22 @ 19:19)  SpO2: 98% (22 @ 19:19)  Wt(kg): --    Height (cm): 180.3 ( @ 11:48)    PHYSICAL EXAM:  Constitutional: NAD  HEENT: anicteric sclera, oropharynx clear, MMM  Neck: No JVD  Respiratory: CTAB, no wheezes, rales or rhonchi  Cardiovascular: S1, S2, RRR  Gastrointestinal: BS+, soft, NT/ND  Extremities: No cyanosis or clubbing. No peripheral edema  Neurological: A/O x 3, no focal deficits  Psychiatric: Normal mood, normal affect  : No CVA tenderness. No garcia.   Skin: No rashes  Vascular Access:    LABS:      139  |  105  |  40<H>  ----------------------------<  117<H>  3.6   |  18<L>  |  3.23<H>    Ca    8.5      14 Dec 2022 13:19  Mg     1.60         TPro  6.2  /  Alb  3.4  /  TBili  0.3  /  DBili      /  AST  18  /  ALT  12  /  AlkPhos  60      Creatinine Trend: 3.23 <--                        10.0   7.10  )-----------( 164      ( 14 Dec 2022 13:19 )             31.9     Urine Studies:  Urinalysis Basic - ( 14 Dec 2022 15:16 )    Color: Light Yellow / Appearance: Clear / S.015 / pH:   Gluc:  / Ketone: Negative  / Bili: Negative / Urobili: <2 mg/dL   Blood:  / Protein: 300 mg/dL / Nitrite: Negative   Leuk Esterase: Negative / RBC: 5 /HPF / WBC 8 /HPF   Sq Epi:  / Non Sq Epi: 8 /HPF / Bacteria: Moderate          RADIOLOGY & ADDITIONAL STUDIES:                 [FreeTextEntry8] : Patient is present. Patient is here for back pain.  Initially the pain was in her right lower back and that resolved.  Now the pain is in her left lower back that feels like a soreness.  The pain isn't radiating down her legs.  She isn't taking any over the counter medications today.  She took Tylenol yesterday.  She has been feeling depressed since having the stroke.  She has upcoming appointments with the neurologist and cardiologist [facility] - S Alondra / Willa S /D Mark/ VIVI Dinero/ VIVI Bill/ Marco Jimenez / ABDOULAYE Nanceu/ O Joseph  service -[phone], office [phone]  ---------------------------------------------------------------------------------------------------------------  Patient seen and examined bedside in ER    73yr old female with a pmh of CKD4, T2DM not on insulin, anemia, HTN, gout who presents with uncontrolled hypertension as well as acute on chronic right flank/abdominal pain that is 10/10 and described as if "someone is cutting" her. in ER pt found to have /125->154/63 at time of exam. satting 98% RA  Denied recent NSAID use/Abx use/iv contrast studies.  Renal consult for CKD Mx. Pt well known to our gp, was seen and followed by Dr Dinero at [facility] during recent hospitalization 2wks ago w/similar abd pain, Cr ranged 3.5-3.7, CT w/o obstructive uropathy  Was scheduled to f/u w/my partner Dr Jimenez for ckd Mx. pt was scheduled for Ureteroscopy 22, was cancelled due to uncontrolled HTN   Denies  vomitings, nausea, headache, dizziness, chest pain, palpitations, SOB, joint pains, diarrhea/constipation. Pt endorsed increased urinary frequency but denied dysuria/hematuria.     PAST MEDICAL & SURGICAL HISTORY:  HTN (hypertension)      HLD (hyperlipidemia)      Gout      GI bleed      Posterior circulation stroke      CVA (cerebrovascular accident)      Stage 4 chronic kidney disease      Anemia  last transfusion  ,      Diabetes mellitus treated with insulin      Diverticulitis      S/P hip replacement      S/P lumpectomy, right breast      S/P knee replacement, bilateral      Allergies: aspirin (Vomiting)  sulfa drugs (Flushing)  sulfADIAZINE (Rash)    Home Medications Reviewed  Hospital Medications:   MEDICATIONS  (STANDING):  dextrose 5%. 1000 milliLiter(s) (100 mL/Hr) IV Continuous <Continuous>  dextrose 5%. 1000 milliLiter(s) (50 mL/Hr) IV Continuous <Continuous>  dextrose 50% Injectable 25 Gram(s) IV Push once  dextrose 50% Injectable 12.5 Gram(s) IV Push once  dextrose 50% Injectable 25 Gram(s) IV Push once  glucagon  Injectable 1 milliGRAM(s) IntraMuscular once  insulin lispro (ADMELOG) corrective regimen sliding scale   SubCutaneous three times a day before meals  insulin lispro (ADMELOG) corrective regimen sliding scale   SubCutaneous at bedtime    SOCIAL HISTORY:  Denies ETOh, Smoking, illicit drug use  FAMILY HISTORY:  Family history of diabetes mellitus (Grandparent)      REVIEW OF SYSTEMS:  CONSTITUTIONAL: No weakness, fevers or chills  EYES/ENT: No visual changes;  No vertigo or throat pain   NECK: No pain or stiffness  RESPIRATORY: No cough, wheezing, hemoptysis; No shortness of breath  CARDIOVASCULAR: No chest pain or palpitations.  GASTROINTESTINAL: + abdominal pain. No nausea, vomiting, or hematemesis; No diarrhea or constipation. No melena or hematochezia.  GENITOURINARY: No dysuria, frequency, foamy urine, incontinence or hematuria. inc  urinary urgency,  NEUROLOGICAL: No numbness or weakness  SKIN: No itching, burning, rashes, or lesions   VASCULAR: No bilateral lower extremity edema.   All other review of systems is negative unless indicated above.    VITALS:  T(F): 97.2 (22 @ 17:48), Max: 98.2 (22 @ 11:48)  HR: 92 (22 @ 19:19)  BP: 154/63 (22 @ 19:19)  RR: 18 (22 @ 19:19)  SpO2: 98% (22 @ 19:19)  Wt(kg): --    Height (cm): 180.3 ( @ 11:48)    PHYSICAL EXAM:  Constitutional: NAD  HEENT: anicteric sclera  Neck: No JVD  Respiratory: CTAB, no wheezes, rales or rhonchi  Cardiovascular: S1, S2, RRR  Gastrointestinal: BS+, soft, NT  Extremities: no pedal edema b/l   Neurological: A/O x 3  Psychiatric: Normal mood, normal affect  : No garcia.     LABS:      139  |  105  |  40<H>  ----------------------------<  117<H>  3.6   |  18<L>  |  3.23<H>    Ca    8.5      14 Dec 2022 13:19  Mg     1.60         TPro  6.2  /  Alb  3.4  /  TBili  0.3  /  DBili      /  AST  18  /  ALT  12  /  AlkPhos  60      Creatinine Trend: 3.23 <--                        10.0   7.10  )-----------( 164      ( 14 Dec 2022 13:19 )             31.9     Urine Studies:  Urinalysis Basic - ( 14 Dec 2022 15:16 )    Color: Light Yellow / Appearance: Clear / S.015 / pH:   Gluc:  / Ketone: Negative  / Bili: Negative / Urobili: <2 mg/dL   Blood:  / Protein: 300 mg/dL / Nitrite: Negative   Leuk Esterase: Negative / RBC: 5 /HPF / WBC 8 /HPF   Sq Epi:  / Non Sq Epi: 8 /HPF / Bacteria: Moderate    RADIOLOGY & ADDITIONAL STUDIES:      < from: Xray Chest 1 View- PORTABLE-Urgent (Xray Chest 1 View- PORTABLE-Urgent .) (22 @ 18:52) >    IMPRESSION:  Clear lungs.    --- End of Report ---    < end of copied text >    < from: CT Abdomen and Pelvis No Cont (22 @ 19:18) >  KIDNEYS/URETERS: No renal or ureteral stones seen, distal ureters   obscured by metallic streak artifact. No hydronephrosis. Few right renal   cysts.    BLADDER: Obscured by metallic streak artifact.    < from: CT Abdomen and Pelvis No Cont (22 @ 19:18) >  IMPRESSION:  No renal stones or obstructive uropathy.    < end of copied text >         [facility] - S Alondra / Willa S /D Mark/ VIVI Dinero/ VIVI Bill/ Marco Jimenez / ABDOULAYE Nanceu/ O Joseph  service -[phone], office [phone]  ---------------------------------------------------------------------------------------------------------------  Patient seen and examined bedside in ER    73yr old female with a pmh of CKD4, T2DM not on insulin, anemia, HTN, gout who presents with uncontrolled hypertension as well as acute on chronic right flank/abdominal pain that is 10/10 and described as if "someone is cutting" her. in ER pt found to have /125, s/p hydralazine 10mg IV in the ED > bp 154/63 at time of exam. satting 98% RA    Renal consulted for CKD Mx. Pt well known to our gp, was seen and followed by Dr Dinero at [facility] during recent hospitalization 2wks ago w/similar abd pain, Cr ranged 3.5-3.7, CT w/o obstructive uropathy  Was scheduled to f/u w/my partner Dr Jimenez for ckd Mx. pt was scheduled for Ureteroscopy 22, was cancelled due to uncontrolled HTN   Denies  vomitings, nausea, headache, dizziness, chest pain, palpitations, SOB, joint pains, diarrhea/constipation. Pt endorsed increased urinary frequency but denied dysuria/hematuria.   Denied recent NSAID use/Abx use/iv contrast studies.    PAST MEDICAL & SURGICAL HISTORY:  HTN (hypertension)      HLD (hyperlipidemia)      Gout      GI bleed      Posterior circulation stroke      CVA (cerebrovascular accident)      Stage 4 chronic kidney disease      Anemia  last transfusion  ,      Diabetes mellitus treated with insulin      Diverticulitis      S/P hip replacement      S/P lumpectomy, right breast      S/P knee replacement, bilateral      Allergies: aspirin (Vomiting)  sulfa drugs (Flushing)  sulfADIAZINE (Rash)    Home Medications Reviewed  Hospital Medications:   MEDICATIONS  (STANDING):  dextrose 5%. 1000 milliLiter(s) (100 mL/Hr) IV Continuous <Continuous>  dextrose 5%. 1000 milliLiter(s) (50 mL/Hr) IV Continuous <Continuous>  dextrose 50% Injectable 25 Gram(s) IV Push once  dextrose 50% Injectable 12.5 Gram(s) IV Push once  dextrose 50% Injectable 25 Gram(s) IV Push once  glucagon  Injectable 1 milliGRAM(s) IntraMuscular once  insulin lispro (ADMELOG) corrective regimen sliding scale   SubCutaneous three times a day before meals  insulin lispro (ADMELOG) corrective regimen sliding scale   SubCutaneous at bedtime    SOCIAL HISTORY:  Denies ETOh, Smoking, illicit drug use  FAMILY HISTORY:  Family history of diabetes mellitus (Grandparent)      REVIEW OF SYSTEMS:  CONSTITUTIONAL: No weakness, fevers or chills  EYES/ENT: No visual changes;  No vertigo or throat pain   NECK: No pain or stiffness  RESPIRATORY: No cough, wheezing, hemoptysis; No shortness of breath  CARDIOVASCULAR: No chest pain or palpitations.  GASTROINTESTINAL: + abdominal pain. No nausea, vomiting, or hematemesis; No diarrhea or constipation. No melena or hematochezia.  GENITOURINARY: No dysuria, frequency, foamy urine, incontinence or hematuria. inc  urinary urgency,  NEUROLOGICAL: No numbness or weakness  SKIN: No itching, burning, rashes, or lesions   VASCULAR: No bilateral lower extremity edema.   All other review of systems is negative unless indicated above.    VITALS:  T(F): 97.2 (22 @ 17:48), Max: 98.2 (22 @ 11:48)  HR: 92 (22 @ 19:19)  BP: 154/63 (22 @ 19:19)  RR: 18 (22 @ 19:19)  SpO2: 98% (22 @ 19:19)  Wt(kg): --    Height (cm): 180.3 ( @ 11:48)    PHYSICAL EXAM:  Constitutional: NAD  HEENT: anicteric sclera  Neck: No JVD  Respiratory: CTAB, no wheezes, rales or rhonchi  Cardiovascular: S1, S2, RRR  Gastrointestinal: BS+, soft, NT  Extremities: no pedal edema b/l   Neurological: A/O x 3  Psychiatric: Normal mood, normal affect  : No garcia.     LABS:      139  |  105  |  40<H>  ----------------------------<  117<H>  3.6   |  18<L>  |  3.23<H>    Ca    8.5      14 Dec 2022 13:19  Mg     1.60         TPro  6.2  /  Alb  3.4  /  TBili  0.3  /  DBili      /  AST  18  /  ALT  12  /  AlkPhos  60      Creatinine Trend: 3.23 <--                        10.0   7.10  )-----------( 164      ( 14 Dec 2022 13:19 )             31.9     Urine Studies:  Urinalysis Basic - ( 14 Dec 2022 15:16 )    Color: Light Yellow / Appearance: Clear / S.015 / pH:   Gluc:  / Ketone: Negative  / Bili: Negative / Urobili: <2 mg/dL   Blood:  / Protein: 300 mg/dL / Nitrite: Negative   Leuk Esterase: Negative / RBC: 5 /HPF / WBC 8 /HPF   Sq Epi:  / Non Sq Epi: 8 /HPF / Bacteria: Moderate    RADIOLOGY & ADDITIONAL STUDIES:      < from: Xray Chest 1 View- PORTABLE-Urgent (Xray Chest 1 View- PORTABLE-Urgent .) (22 @ 18:52) >    IMPRESSION:  Clear lungs.    --- End of Report ---    < end of copied text >    < from: CT Abdomen and Pelvis No Cont (22 @ 19:18) >  KIDNEYS/URETERS: No renal or ureteral stones seen, distal ureters   obscured by metallic streak artifact. No hydronephrosis. Few right renal   cysts.    BLADDER: Obscured by metallic streak artifact.    < from: CT Abdomen and Pelvis No Cont (22 @ 19:18) >  IMPRESSION:  No renal stones or obstructive uropathy.    < end of copied text >

## 2023-08-01 NOTE — PHYSICAL EXAM
[No Acute Distress] : no acute distress [Well Nourished] : well nourished [Well Developed] : well developed [Well-Appearing] : well-appearing [Normal Voice/Communication] : normal voice/communication [Normal Mood] : the mood was normal [de-identified] : tenderness on palpation of left lumbar area, decreased flexion and extension of lumbar spine [de-identified] : gait is slow and stiff

## 2023-08-02 ENCOUNTER — TRANSCRIPTION ENCOUNTER (OUTPATIENT)
Age: 63
End: 2023-08-02

## 2023-08-08 ENCOUNTER — APPOINTMENT (OUTPATIENT)
Dept: NEUROLOGY | Facility: CLINIC | Age: 63
End: 2023-08-08
Payer: COMMERCIAL

## 2023-08-08 VITALS
SYSTOLIC BLOOD PRESSURE: 133 MMHG | HEART RATE: 78 BPM | BODY MASS INDEX: 36.32 KG/M2 | OXYGEN SATURATION: 97 % | WEIGHT: 225 LBS | DIASTOLIC BLOOD PRESSURE: 72 MMHG | TEMPERATURE: 98 F

## 2023-08-08 PROCEDURE — 99215 OFFICE O/P EST HI 40 MIN: CPT

## 2023-08-08 NOTE — HISTORY OF PRESENT ILLNESS
[FreeTextEntry1] : Since her hospitalization patient is recovering.  She has good return of her vision without residual visual field deficits.  She does have some difficulty with significant residual fatigue since her stroke.  Her endurance and her strength in general is limited.  She is able to ambulate with out assistive device and she is able to perform her basic activities of daily living independently. Her short-term memory seems to be recovering well.  She is tolerating Eliquis without any reported bleeding issues.  She is on 40 mg of atorvastatin.

## 2023-08-08 NOTE — DISCUSSION/SUMMARY
[FreeTextEntry1] : 63-year-old woman hospitalized at [facility] 7/1/2023 as a transfer from [facility] found to have a right posterior cerebral artery occlusion status post intravenous thrombolysis with tenecteplase and successful TICI3 revascularization with mechanical thrombectomy newly diagnosed atrial fibrillation now anticoagulated with Eliquis.  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  MRI head with residual small right cerebellar and right hippocampal and right thalamic ischemic infarct without hemorrhage. Intracranial and cervical arteries without significant stenosis. Etiology of stroke is cardioembolism from atrial fibrillation. Anticoagulated with Eliquis and tolerating well.  I have counseled the patient in regards to potentially fatal or disabling intracranial or gastrointestinal hemorrhage with anticoagulation.    Neurologically she is recovering well with return of her vision but with some significant fatigue, poor endurance and some mild cognitive residual deficits.   She is planned for MIRANDA with cardioversion electively as outpatient.  I recommend further recovery prior to cardioversion.  She is also planned for a nuclear myocardial perfusion scan.  I recommend 3 months off work for now and repeat evaluation at follow-up for ability to return to work full-time and without restriction.  At the time of her stroke, 7/1/2023, her LDL was 91.  She is on 40 mg of atorvastatin.  I recommended decreasing this to 20 mg at bedtime to see if this improves her fatigue.  In regards to her incidental anterior communicating artery aneurysm, further imaging with DSA with 3D reconstruction is recommended in the near future to further  patient about options for treatment versus conversion over to management.  plan 1. Continue Eliquis 5mg BID 2. decrease the atorvastatin to 20mg at bedtime.  3.  Increase activity, exercise, proper diet and weight loss. 4.  Monitor for any bleeding. 5.  Complete dedicated cerebral angiogram for AComm aneurysm - recommend after further recovery, sometime in October 2023.     Follow up in 2 months for fitness to return to work.

## 2023-08-08 NOTE — PHYSICAL EXAM
[FreeTextEntry1] : GENERAL APPEARANCE: Well developed, well nourished woman in no acute distress. CARDIOVASCULAR: Irregular rate and rhythm.    NEUROLOGIC EXAM: MENTAL STATUS: Alert and Oriented to person, place and time. Speech is fluent, without aphasia or dysarthria. Behavior and affect appropriate to situation.                         CRANIAL NERVES: CN 2:    Visual fields are full to confrontation. CN 3, 4, 6: Extraocular movements are intact. No nystagmus or ophthalmoplegia is evident. Pupils are equally round and reactive to light. CN 5:     Facial sensation is intact to light touch in all 3 divisions. CN 7:     Facial excursion is full and symmetric bilaterally. MOTOR: Strength is 5/5 throughout for age and stature. No orbiting or drift noted. SENSORY: Intact to light touch and sharp perception in all four extremities without extinction to double simultaneous stimulation. COORD: Finger to nose testing without dysmetria bilaterally. GAIT: Normal station and gait.   NIHSS: 0 mRS: 1

## 2023-08-10 ENCOUNTER — TRANSCRIPTION ENCOUNTER (OUTPATIENT)
Age: 63
End: 2023-08-10

## 2023-08-10 ENCOUNTER — APPOINTMENT (OUTPATIENT)
Dept: CARDIOLOGY | Facility: CLINIC | Age: 63
End: 2023-08-10
Payer: COMMERCIAL

## 2023-08-10 PROCEDURE — 93015 CV STRESS TEST SUPVJ I&R: CPT

## 2023-08-10 PROCEDURE — 78452 HT MUSCLE IMAGE SPECT MULT: CPT

## 2023-08-10 PROCEDURE — A9500: CPT

## 2023-08-30 ENCOUNTER — APPOINTMENT (OUTPATIENT)
Dept: FAMILY MEDICINE | Facility: CLINIC | Age: 63
End: 2023-08-30
Payer: COMMERCIAL

## 2023-08-30 VITALS — SYSTOLIC BLOOD PRESSURE: 116 MMHG | DIASTOLIC BLOOD PRESSURE: 66 MMHG

## 2023-08-30 VITALS
HEART RATE: 74 BPM | WEIGHT: 229 LBS | RESPIRATION RATE: 15 BRPM | SYSTOLIC BLOOD PRESSURE: 112 MMHG | DIASTOLIC BLOOD PRESSURE: 84 MMHG | BODY MASS INDEX: 36.8 KG/M2 | OXYGEN SATURATION: 98 % | HEIGHT: 66 IN

## 2023-08-30 DIAGNOSIS — R53.83 OTHER FATIGUE: ICD-10-CM

## 2023-08-30 PROCEDURE — 99214 OFFICE O/P EST MOD 30 MIN: CPT

## 2023-08-30 RX ORDER — ATORVASTATIN CALCIUM 40 MG/1
40 TABLET, FILM COATED ORAL
Qty: 90 | Refills: 3 | Status: DISCONTINUED | COMMUNITY
Start: 2023-07-13 | End: 2023-08-30

## 2023-08-30 NOTE — PHYSICAL EXAM
[No Acute Distress] : no acute distress [Well Nourished] : well nourished [Well Developed] : well developed [Well-Appearing] : well-appearing [Normal Voice/Communication] : normal voice/communication [No Respiratory Distress] : no respiratory distress  [No Accessory Muscle Use] : no accessory muscle use [Clear to Auscultation] : lungs were clear to auscultation bilaterally [No Edema] : there was no peripheral edema [Coordination Grossly Intact] : coordination grossly intact [Normal Mood] : the mood was normal [de-identified] : irregularly irregular

## 2023-08-30 NOTE — HISTORY OF PRESENT ILLNESS
[FreeTextEntry1] : Patient presents for a follow up on blood pressure  [de-identified] : She followed up with neurology and cardiology and had a negative stress test.  She has been a little more energetic but still fatigues easily.  She still cries easily.

## 2023-08-30 NOTE — PLAN
[FreeTextEntry1] : the neurology note and stress test reports were reviewed, for the stable hypertension she will continue to take metoprolol tartrate 100 mg bid and diltiazem  mg daily.  For the atrial fibrillation she will continue to take Eliquis 5 mg bid and for the CVA will continue to take atorvastatin which was just lowered to 20 mg daily.  She was given emotional support and will follow up in 3 months or sooner prn

## 2023-10-03 ENCOUNTER — APPOINTMENT (OUTPATIENT)
Dept: NEUROLOGY | Facility: CLINIC | Age: 63
End: 2023-10-03
Payer: COMMERCIAL

## 2023-10-03 VITALS
DIASTOLIC BLOOD PRESSURE: 82 MMHG | SYSTOLIC BLOOD PRESSURE: 125 MMHG | WEIGHT: 227 LBS | TEMPERATURE: 97.9 F | HEIGHT: 66 IN | OXYGEN SATURATION: 98 % | HEART RATE: 80 BPM | BODY MASS INDEX: 36.48 KG/M2

## 2023-10-03 PROCEDURE — 99214 OFFICE O/P EST MOD 30 MIN: CPT

## 2023-10-10 ENCOUNTER — NON-APPOINTMENT (OUTPATIENT)
Age: 63
End: 2023-10-10

## 2023-10-10 ENCOUNTER — APPOINTMENT (OUTPATIENT)
Dept: CARDIOLOGY | Facility: CLINIC | Age: 63
End: 2023-10-10
Payer: COMMERCIAL

## 2023-10-10 VITALS
OXYGEN SATURATION: 99 % | DIASTOLIC BLOOD PRESSURE: 70 MMHG | WEIGHT: 217 LBS | BODY MASS INDEX: 34.87 KG/M2 | SYSTOLIC BLOOD PRESSURE: 108 MMHG | HEIGHT: 66 IN | HEART RATE: 47 BPM

## 2023-10-10 DIAGNOSIS — I48.19 OTHER PERSISTENT ATRIAL FIBRILLATION: ICD-10-CM

## 2023-10-10 PROCEDURE — 99214 OFFICE O/P EST MOD 30 MIN: CPT

## 2023-10-10 PROCEDURE — 93000 ELECTROCARDIOGRAM COMPLETE: CPT

## 2023-10-10 RX ORDER — FLUTICASONE PROPIONATE 50 UG/1
50 SPRAY, METERED NASAL DAILY
Qty: 1 | Refills: 3 | Status: DISCONTINUED | COMMUNITY
Start: 2022-04-19 | End: 2023-10-10

## 2023-11-29 ENCOUNTER — APPOINTMENT (OUTPATIENT)
Dept: FAMILY MEDICINE | Facility: CLINIC | Age: 63
End: 2023-11-29

## 2023-12-11 ENCOUNTER — APPOINTMENT (OUTPATIENT)
Dept: NEUROLOGY | Facility: CLINIC | Age: 63
End: 2023-12-11

## 2023-12-14 ENCOUNTER — APPOINTMENT (OUTPATIENT)
Dept: FAMILY MEDICINE | Facility: CLINIC | Age: 63
End: 2023-12-14

## 2023-12-18 ENCOUNTER — APPOINTMENT (OUTPATIENT)
Dept: FAMILY MEDICINE | Facility: CLINIC | Age: 63
End: 2023-12-18

## 2023-12-19 DIAGNOSIS — G47.30 SLEEP APNEA, UNSPECIFIED: ICD-10-CM

## 2023-12-20 ENCOUNTER — APPOINTMENT (OUTPATIENT)
Dept: CARE COORDINATION | Facility: CLINIC | Age: 63
End: 2023-12-20
Payer: COMMERCIAL

## 2023-12-20 ENCOUNTER — APPOINTMENT (OUTPATIENT)
Dept: FAMILY MEDICINE | Facility: CLINIC | Age: 63
End: 2023-12-20
Payer: COMMERCIAL

## 2023-12-20 VITALS
DIASTOLIC BLOOD PRESSURE: 90 MMHG | BODY MASS INDEX: 36.32 KG/M2 | HEART RATE: 63 BPM | SYSTOLIC BLOOD PRESSURE: 148 MMHG | WEIGHT: 226 LBS | HEIGHT: 66 IN | TEMPERATURE: 98.1 F | OXYGEN SATURATION: 97 %

## 2023-12-20 PROCEDURE — 99214 OFFICE O/P EST MOD 30 MIN: CPT

## 2023-12-20 PROCEDURE — 99214 OFFICE O/P EST MOD 30 MIN: CPT | Mod: 95

## 2023-12-20 NOTE — HEALTH RISK ASSESSMENT
[No] : No [Never (0 pts)] : Never (0 points) [No falls in past year] : Patient reported no falls in the past year [0] : 2) Feeling down, depressed, or hopeless: Not at all (0) [BIH2Cpyuc] : 0

## 2023-12-20 NOTE — PHYSICAL EXAM
[No Edema] : there was no peripheral edema [Normal] : no rash [Coordination Grossly Intact] : coordination grossly intact [No Focal Deficits] : no focal deficits [Normal Gait] : normal gait [Normal Affect] : the affect was normal [Alert and Oriented x3] : oriented to person, place, and time [Normal Insight/Judgement] : insight and judgment were intact

## 2023-12-20 NOTE — ASSESSMENT
[FreeTextEntry1] : Stroke/Afib - continue Eliquis 5mg BID, Diltiazem ER 360mg QD, Metoprolol 100mg BID - continue follow up with cardio  HTN - elevated on today's exam 148/90 -will refer to [facility] Remote Monitoring for hypertension - Advised patient to call cardiologist for blood pressure management.   F/U with PCP in 2 months

## 2023-12-20 NOTE — PHYSICAL EXAM
[No Acute Distress] : no acute distress [Well-Appearing] : well-appearing [Normal Voice/Communication] : normal voice/communication [Normal] : no acute distress, well nourished, well developed and well-appearing [Alert and Oriented x3] : oriented to person, place, and time [Normal Mood] : the mood was normal [de-identified] : wears glasses

## 2023-12-20 NOTE — ASSESSMENT
[FreeTextEntry1] :  Patient with blood pressures not at goal who would benefit from remote monitoring and management to improve high blood pressure and overall lifestyle:  Start RPM. Adjust medications towards achievement of target BP <130/80.  Patient oriented to RPM program including daily measurements, escalations, regular monitoring and touch-bases as well as possible need to titrate medications  Monitor BP twice a day in the AM and PM  Medications Plan: continue current medication, will reevaluate with data collection.  Patient confirms appropriate screenings. (Lipids, CMP, UA, Optho)

## 2023-12-20 NOTE — HISTORY OF PRESENT ILLNESS
[FreeTextEntry1] : Pt with recent dx A-fib/ CVA, had cardioversion last week. Presents today for BP check. [de-identified] : WINNIE PEREZ is a 63 year female who presents today Dec 20, 2023 for blood pressure check.  She had cardioversion last week. Her doctors are happy with the results. She has sleep apnea. She is waiting for CPAP.  She took her BP medication around 11AM.   Patient denies any lightheadedness, dizziness, chest pain, palpitations, shortness or breath, or lower leg edema.

## 2024-01-02 ENCOUNTER — APPOINTMENT (OUTPATIENT)
Dept: NEUROLOGY | Facility: CLINIC | Age: 64
End: 2024-01-02
Payer: COMMERCIAL

## 2024-01-02 VITALS
HEART RATE: 55 BPM | WEIGHT: 228 LBS | BODY MASS INDEX: 36.64 KG/M2 | SYSTOLIC BLOOD PRESSURE: 149 MMHG | TEMPERATURE: 97.9 F | DIASTOLIC BLOOD PRESSURE: 78 MMHG | OXYGEN SATURATION: 98 % | HEIGHT: 66 IN

## 2024-01-02 PROCEDURE — 99214 OFFICE O/P EST MOD 30 MIN: CPT

## 2024-01-02 NOTE — DISCUSSION/SUMMARY
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  She underwent MIRANDA with cardioversion electively outpatient 3 weeks ago. She is doing well post procedure. She is planning for ablation in the coming months.   Neurologically she is recovering well with return of her vision. She has residual fatigue and is dealing with depression and anxiety post stroke but continues to see a therapist twice a week which helps.  Patient was educated about signs and symptoms of stroke and was counseled to contact 911 upon emergence of stroke like symptoms. Intravenous thrombolysis and mechanical thrombectomy was also counseled and educated to the patient today.  I recommend further evaluation of her cerebral aneurysm to aid in treatment plan. Plan for diagnostic cerebral angiogram for AComm aneurysm on January 11th.  I discussed with the patient the details of diagnostic cerebral angiography. The benefits, alternatives and risks of cerebral angiography were explained in detail including potential ischemic stroke, arterial dissection, vascular access site bleeding or infection, contrast reaction, and long term effects of radiation exposure. No guarantees for success of the procedure was made. The patient was in agreement with the plan and conveyed good understanding.   PLAN: 1. Continue Eliquis 5mg BID 2. Continue atorvastatin 20mg at bedtime. 3. Increase activity, exercise, proper diet and weight loss. 4. Plan for diagnostic cerebral angiogram for AComm aneurysm on January 11, 2024    Follow up in 1 month, or sooner if needed.

## 2024-01-02 NOTE — HISTORY OF PRESENT ILLNESS
[FreeTextEntry1] : 63-year-old woman with pmHx of HTN, hospitalized at 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   She is doing well overall but continues to note easy fatigue and mild balance issues. She continues to speak to her therapist twice a week, and she states it is helping with her depression and anxiety. She underwent cardioversion 3 weeks ago and is planning to undergo an ablation in the upcoming months. She continues to take Eliquis as directed and is tolerating well. She denies any s/s of bleeding. Denies recurrent or new TIA/Stroke symptoms.

## 2024-01-02 NOTE — PHYSICAL EXAM
[FreeTextEntry1] : GENERAL APPEARANCE: Well developed, well nourished woman in no acute distress.  NEUROLOGIC EXAM: MENTAL STATUS: Alert and Oriented to person, place and time. Speech is fluent, without aphasia or dysarthria. CRANIAL NERVES: CN 2: Visual fields are full to confrontation. CN 3, 4, 6: Extraocular movements are intact. No nystagmus or ophthalmoplegia is evident. Pupils are equally round and reactive to light. CN 5: Facial sensation is intact to light touch in all 3 divisions. CN 7: Facial excursion is full and symmetric bilaterally. MOTOR: Strength is 5/5 throughout for age and stature. No orbiting or drift noted. SENSORY: Intact to light touch and perception in all four extremities without extinction to double simultaneous stimulation. COORD: Finger to nose testing without dysmetria bilaterally. GAIT: Normal station and mildly wobbly gait.  NIHSS-0 mRS- 1

## 2024-01-05 ENCOUNTER — OUTPATIENT (OUTPATIENT)
Dept: OUTPATIENT SERVICES | Facility: HOSPITAL | Age: 64
LOS: 1 days | End: 2024-01-05
Payer: COMMERCIAL

## 2024-01-05 VITALS
HEIGHT: 66 IN | OXYGEN SATURATION: 97 % | RESPIRATION RATE: 14 BRPM | WEIGHT: 227.08 LBS | TEMPERATURE: 98 F | HEART RATE: 56 BPM | DIASTOLIC BLOOD PRESSURE: 70 MMHG | SYSTOLIC BLOOD PRESSURE: 140 MMHG

## 2024-01-05 DIAGNOSIS — I67.1 CEREBRAL ANEURYSM, NONRUPTURED: ICD-10-CM

## 2024-01-05 DIAGNOSIS — Z29.9 ENCOUNTER FOR PROPHYLACTIC MEASURES, UNSPECIFIED: ICD-10-CM

## 2024-01-05 DIAGNOSIS — I10 ESSENTIAL (PRIMARY) HYPERTENSION: ICD-10-CM

## 2024-01-05 DIAGNOSIS — Z98.890 OTHER SPECIFIED POSTPROCEDURAL STATES: Chronic | ICD-10-CM

## 2024-01-05 DIAGNOSIS — I48.91 UNSPECIFIED ATRIAL FIBRILLATION: ICD-10-CM

## 2024-01-05 DIAGNOSIS — Z92.89 PERSONAL HISTORY OF OTHER MEDICAL TREATMENT: Chronic | ICD-10-CM

## 2024-01-05 DIAGNOSIS — Z01.818 ENCOUNTER FOR OTHER PREPROCEDURAL EXAMINATION: ICD-10-CM

## 2024-01-05 LAB
A1C WITH ESTIMATED AVERAGE GLUCOSE RESULT: 5.6 % — SIGNIFICANT CHANGE UP (ref 4–5.6)
A1C WITH ESTIMATED AVERAGE GLUCOSE RESULT: 5.6 % — SIGNIFICANT CHANGE UP (ref 4–5.6)
ALBUMIN SERPL ELPH-MCNC: 4.1 G/DL — SIGNIFICANT CHANGE UP (ref 3.3–5.2)
ALBUMIN SERPL ELPH-MCNC: 4.1 G/DL — SIGNIFICANT CHANGE UP (ref 3.3–5.2)
ALP SERPL-CCNC: 148 U/L — HIGH (ref 40–120)
ALP SERPL-CCNC: 148 U/L — HIGH (ref 40–120)
ALT FLD-CCNC: 14 U/L — SIGNIFICANT CHANGE UP
ALT FLD-CCNC: 14 U/L — SIGNIFICANT CHANGE UP
ANION GAP SERPL CALC-SCNC: 11 MMOL/L — SIGNIFICANT CHANGE UP (ref 5–17)
ANION GAP SERPL CALC-SCNC: 11 MMOL/L — SIGNIFICANT CHANGE UP (ref 5–17)
APTT BLD: 34.5 SEC — SIGNIFICANT CHANGE UP (ref 24.5–35.6)
APTT BLD: 34.5 SEC — SIGNIFICANT CHANGE UP (ref 24.5–35.6)
AST SERPL-CCNC: 23 U/L — SIGNIFICANT CHANGE UP
AST SERPL-CCNC: 23 U/L — SIGNIFICANT CHANGE UP
BASOPHILS # BLD AUTO: 0.03 K/UL — SIGNIFICANT CHANGE UP (ref 0–0.2)
BASOPHILS # BLD AUTO: 0.03 K/UL — SIGNIFICANT CHANGE UP (ref 0–0.2)
BASOPHILS NFR BLD AUTO: 0.3 % — SIGNIFICANT CHANGE UP (ref 0–2)
BASOPHILS NFR BLD AUTO: 0.3 % — SIGNIFICANT CHANGE UP (ref 0–2)
BILIRUB SERPL-MCNC: 0.8 MG/DL — SIGNIFICANT CHANGE UP (ref 0.4–2)
BILIRUB SERPL-MCNC: 0.8 MG/DL — SIGNIFICANT CHANGE UP (ref 0.4–2)
BLD GP AB SCN SERPL QL: SIGNIFICANT CHANGE UP
BLD GP AB SCN SERPL QL: SIGNIFICANT CHANGE UP
BUN SERPL-MCNC: 18.8 MG/DL — SIGNIFICANT CHANGE UP (ref 8–20)
BUN SERPL-MCNC: 18.8 MG/DL — SIGNIFICANT CHANGE UP (ref 8–20)
CALCIUM SERPL-MCNC: 9.6 MG/DL — SIGNIFICANT CHANGE UP (ref 8.4–10.5)
CALCIUM SERPL-MCNC: 9.6 MG/DL — SIGNIFICANT CHANGE UP (ref 8.4–10.5)
CHLORIDE SERPL-SCNC: 104 MMOL/L — SIGNIFICANT CHANGE UP (ref 96–108)
CHLORIDE SERPL-SCNC: 104 MMOL/L — SIGNIFICANT CHANGE UP (ref 96–108)
CO2 SERPL-SCNC: 27 MMOL/L — SIGNIFICANT CHANGE UP (ref 22–29)
CO2 SERPL-SCNC: 27 MMOL/L — SIGNIFICANT CHANGE UP (ref 22–29)
CREAT SERPL-MCNC: 0.69 MG/DL — SIGNIFICANT CHANGE UP (ref 0.5–1.3)
CREAT SERPL-MCNC: 0.69 MG/DL — SIGNIFICANT CHANGE UP (ref 0.5–1.3)
EGFR: 96 ML/MIN/1.73M2 — SIGNIFICANT CHANGE UP
EGFR: 96 ML/MIN/1.73M2 — SIGNIFICANT CHANGE UP
EOSINOPHIL # BLD AUTO: 0.16 K/UL — SIGNIFICANT CHANGE UP (ref 0–0.5)
EOSINOPHIL # BLD AUTO: 0.16 K/UL — SIGNIFICANT CHANGE UP (ref 0–0.5)
EOSINOPHIL NFR BLD AUTO: 1.7 % — SIGNIFICANT CHANGE UP (ref 0–6)
EOSINOPHIL NFR BLD AUTO: 1.7 % — SIGNIFICANT CHANGE UP (ref 0–6)
ESTIMATED AVERAGE GLUCOSE: 114 MG/DL — SIGNIFICANT CHANGE UP (ref 68–114)
ESTIMATED AVERAGE GLUCOSE: 114 MG/DL — SIGNIFICANT CHANGE UP (ref 68–114)
GLUCOSE SERPL-MCNC: 92 MG/DL — SIGNIFICANT CHANGE UP (ref 70–99)
GLUCOSE SERPL-MCNC: 92 MG/DL — SIGNIFICANT CHANGE UP (ref 70–99)
HCT VFR BLD CALC: 41.8 % — SIGNIFICANT CHANGE UP (ref 34.5–45)
HCT VFR BLD CALC: 41.8 % — SIGNIFICANT CHANGE UP (ref 34.5–45)
HGB BLD-MCNC: 13.8 G/DL — SIGNIFICANT CHANGE UP (ref 11.5–15.5)
HGB BLD-MCNC: 13.8 G/DL — SIGNIFICANT CHANGE UP (ref 11.5–15.5)
IMM GRANULOCYTES NFR BLD AUTO: 0.3 % — SIGNIFICANT CHANGE UP (ref 0–0.9)
IMM GRANULOCYTES NFR BLD AUTO: 0.3 % — SIGNIFICANT CHANGE UP (ref 0–0.9)
INR BLD: 1.36 RATIO — HIGH (ref 0.85–1.18)
INR BLD: 1.36 RATIO — HIGH (ref 0.85–1.18)
LYMPHOCYTES # BLD AUTO: 1.96 K/UL — SIGNIFICANT CHANGE UP (ref 1–3.3)
LYMPHOCYTES # BLD AUTO: 1.96 K/UL — SIGNIFICANT CHANGE UP (ref 1–3.3)
LYMPHOCYTES # BLD AUTO: 20.9 % — SIGNIFICANT CHANGE UP (ref 13–44)
LYMPHOCYTES # BLD AUTO: 20.9 % — SIGNIFICANT CHANGE UP (ref 13–44)
MCHC RBC-ENTMCNC: 28.9 PG — SIGNIFICANT CHANGE UP (ref 27–34)
MCHC RBC-ENTMCNC: 28.9 PG — SIGNIFICANT CHANGE UP (ref 27–34)
MCHC RBC-ENTMCNC: 33 GM/DL — SIGNIFICANT CHANGE UP (ref 32–36)
MCHC RBC-ENTMCNC: 33 GM/DL — SIGNIFICANT CHANGE UP (ref 32–36)
MCV RBC AUTO: 87.6 FL — SIGNIFICANT CHANGE UP (ref 80–100)
MCV RBC AUTO: 87.6 FL — SIGNIFICANT CHANGE UP (ref 80–100)
MONOCYTES # BLD AUTO: 0.63 K/UL — SIGNIFICANT CHANGE UP (ref 0–0.9)
MONOCYTES # BLD AUTO: 0.63 K/UL — SIGNIFICANT CHANGE UP (ref 0–0.9)
MONOCYTES NFR BLD AUTO: 6.7 % — SIGNIFICANT CHANGE UP (ref 2–14)
MONOCYTES NFR BLD AUTO: 6.7 % — SIGNIFICANT CHANGE UP (ref 2–14)
MRSA PCR RESULT.: SIGNIFICANT CHANGE UP
MRSA PCR RESULT.: SIGNIFICANT CHANGE UP
NEUTROPHILS # BLD AUTO: 6.57 K/UL — SIGNIFICANT CHANGE UP (ref 1.8–7.4)
NEUTROPHILS # BLD AUTO: 6.57 K/UL — SIGNIFICANT CHANGE UP (ref 1.8–7.4)
NEUTROPHILS NFR BLD AUTO: 70.1 % — SIGNIFICANT CHANGE UP (ref 43–77)
NEUTROPHILS NFR BLD AUTO: 70.1 % — SIGNIFICANT CHANGE UP (ref 43–77)
PLATELET # BLD AUTO: 305 K/UL — SIGNIFICANT CHANGE UP (ref 150–400)
PLATELET # BLD AUTO: 305 K/UL — SIGNIFICANT CHANGE UP (ref 150–400)
POTASSIUM SERPL-MCNC: 4.7 MMOL/L — SIGNIFICANT CHANGE UP (ref 3.5–5.3)
POTASSIUM SERPL-MCNC: 4.7 MMOL/L — SIGNIFICANT CHANGE UP (ref 3.5–5.3)
POTASSIUM SERPL-SCNC: 4.7 MMOL/L — SIGNIFICANT CHANGE UP (ref 3.5–5.3)
POTASSIUM SERPL-SCNC: 4.7 MMOL/L — SIGNIFICANT CHANGE UP (ref 3.5–5.3)
PROT SERPL-MCNC: 7.3 G/DL — SIGNIFICANT CHANGE UP (ref 6.6–8.7)
PROT SERPL-MCNC: 7.3 G/DL — SIGNIFICANT CHANGE UP (ref 6.6–8.7)
PROTHROM AB SERPL-ACNC: 15 SEC — HIGH (ref 9.5–13)
PROTHROM AB SERPL-ACNC: 15 SEC — HIGH (ref 9.5–13)
RBC # BLD: 4.77 M/UL — SIGNIFICANT CHANGE UP (ref 3.8–5.2)
RBC # BLD: 4.77 M/UL — SIGNIFICANT CHANGE UP (ref 3.8–5.2)
RBC # FLD: 12.9 % — SIGNIFICANT CHANGE UP (ref 10.3–14.5)
RBC # FLD: 12.9 % — SIGNIFICANT CHANGE UP (ref 10.3–14.5)
S AUREUS DNA NOSE QL NAA+PROBE: SIGNIFICANT CHANGE UP
S AUREUS DNA NOSE QL NAA+PROBE: SIGNIFICANT CHANGE UP
SODIUM SERPL-SCNC: 142 MMOL/L — SIGNIFICANT CHANGE UP (ref 135–145)
SODIUM SERPL-SCNC: 142 MMOL/L — SIGNIFICANT CHANGE UP (ref 135–145)
WBC # BLD: 9.38 K/UL — SIGNIFICANT CHANGE UP (ref 3.8–10.5)
WBC # BLD: 9.38 K/UL — SIGNIFICANT CHANGE UP (ref 3.8–10.5)
WBC # FLD AUTO: 9.38 K/UL — SIGNIFICANT CHANGE UP (ref 3.8–10.5)
WBC # FLD AUTO: 9.38 K/UL — SIGNIFICANT CHANGE UP (ref 3.8–10.5)

## 2024-01-05 PROCEDURE — G0463: CPT

## 2024-01-05 PROCEDURE — 93005 ELECTROCARDIOGRAM TRACING: CPT

## 2024-01-05 PROCEDURE — 71046 X-RAY EXAM CHEST 2 VIEWS: CPT | Mod: 26

## 2024-01-05 PROCEDURE — 93010 ELECTROCARDIOGRAM REPORT: CPT

## 2024-01-05 PROCEDURE — 71046 X-RAY EXAM CHEST 2 VIEWS: CPT

## 2024-01-05 RX ORDER — SODIUM CHLORIDE 9 MG/ML
3 INJECTION INTRAMUSCULAR; INTRAVENOUS; SUBCUTANEOUS ONCE
Refills: 0 | Status: DISCONTINUED | OUTPATIENT
Start: 2024-01-11 | End: 2024-01-25

## 2024-01-05 NOTE — H&P PST ADULT - PROBLEM SELECTOR PLAN 4
Caprini Score 5, Moderate Risk, Surgical team should assess /Strongly recommend pharmacological and mechanical measures for VTE prophylaxis

## 2024-01-05 NOTE — H&P PST ADULT - NSICDXPASTMEDICALHX_GEN_ALL_CORE_FT
PAST MEDICAL HISTORY:  HTN (hypertension)      PAST MEDICAL HISTORY:  Afib     Carpal tunnel syndrome     Cerebral aneurysm     CVA (cerebrovascular accident)     HTN (hypertension)

## 2024-01-05 NOTE — H&P PST ADULT - NSICDXPASTSURGICALHX_GEN_ALL_CORE_FT
PAST SURGICAL HISTORY:  H/O carpal tunnel repair      PAST SURGICAL HISTORY:  H/O carpal tunnel repair     History of cardioversion

## 2024-01-05 NOTE — H&P PST ADULT - HISTORY OF PRESENT ILLNESS
This is a 62y Female  last seen well at 1200 noon. Developed Left sided numbness weaknee. CT CTA CTP revealed L P!- P2 occlusion with no core and 40 ml penumbra.  Patient was given tenecteplase at [facility] and is being transferred to [facility] for possible thrombectomy.  NIH 2 ICH 0 mod ranikin 1   This is a 62y Female  last seen well at 1200 noon. Developed Left sided numbness weaknee. CT CTA CTP revealed L P!- P2 occlusion with no core and 40 ml penumbra.  Patient was given tenecteplase at [facility] and is being transferred to [facility] for possible thrombectomy.  NIH 2 ICH 0 mod ranikin 1   CVA 7/1/23   s/p cardioversion at [facility]     63-year-old woman with pmHx of HTN, hospitalized at [facility] 7/1/2023 as a transfer from 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     She is doing well overall but continues to note easy fatigue and mild balance issues. She continues to speak to her therapist twice a week, and she states it is helping with her depression and anxiety. She underwent cardioversion 3 weeks ago and is planning to undergo an ablation in the upcoming months. She continues to take Eliquis as directed and is tolerating well. She denies any s/s of bleeding. Denies recurrent or new TIA/Stroke symptoms.    reports fatigue   denies palpitations, chest pain, dyspnea on exertion, dizziness, near syncope, syncope, weakness, or any stroke like symptoms    This is a 62y Female  last seen well at 1200 noon. Developed Left sided numbness weaknee. CT CTA CTP revealed L P!- P2 occlusion with no core and 40 ml penumbra.  Patient was given tenecteplase at [facility] and is being transferred to [facility] for possible thrombectomy.  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     She is doing well overall but continues to note easy fatigue and mild balance issues. She continues to speak to her therapist twice a week, and she states it is helping with her depression and anxiety. She underwent cardioversion 3 weeks ago and is planning to undergo an ablation in the upcoming months. She continues to take Eliquis as directed and is tolerating well. She denies any s/s of bleeding. Denies recurrent or new TIA/Stroke symptoms.    reports fatigue   denies palpitations, chest pain, dyspnea on exertion, dizziness, near syncope, syncope, weakness, or any stroke like symptoms    This is a 62y Female  last seen well at 1200 noon. Developed Left sided numbness weaknee. CT CTA CTP revealed L P!- P2 occlusion with no core and 40 ml penumbra.  Patient was given tenecteplase at [facility] and is being transferred to [facility] for possible thrombectomy.  NIH 2 ICH 0 mod ranikin 1   63 year old female pmhx of HTN, works as CNA at [employer] on 7/1/23 felt light headed went to ED where code stroke was called, patient found to have right posterior cerebral artery occlusion s/p IV thrombolysis with tenecteplase and successful TICI3 revascularization with mechanical thrombectomy.  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  Patient presents to PST today she is s/p cardioversion at [facility] 12/13/23.  Patient presents she is overall feeling well, she reports some fatigue but she denies chest pain, palpitations, dizziness, near syncope, syncope, weakness, confusion, difficulty speaking, gait instability, or any stroke like symptoms. Patient is scheduled for cerebral aneurysm on 1/11/24 with Dr Zhou. 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  Patient presents to PST today she is s/p cardioversion at [facility] 12/13/23.  Patient presents she is overall feeling well, she reports some fatigue but she denies chest pain, palpitations, dizziness, near syncope, syncope, weakness, confusion, difficulty speaking, gait instability, or any stroke like symptoms. Patient is scheduled for cerebral aneurysm on 1/11/24 with Dr Zhou.

## 2024-01-05 NOTE — H&P PST ADULT - ASSESSMENT
CAPRINI SCORE    AGE RELATED RISK FACTORS                                                             [ ] Age 41-60 years                                            (1 Point)  [x ] Age: 61-74 years                                           (2 Points)                 [ ] Age= 75 years                                                (3 Points)             DISEASE RELATED RISK FACTORS                                                       [ ] Edema in the lower extremities                 (1 Point)                     [ ] Varicose veins                                               (1 Point)                                 [x ] BMI > 25 Kg/m2                                            (1 Point)                                  [ ] Serious infection (ie PNA)                            (1 Point)                     [ ] Lung disease ( COPD, Emphysema)            (1 Point)                                                                          [ ] Acute myocardial infarction                         (1 Point)                  [ ] Congestive heart failure (in the previous month)  (1 Point)         [ ] Inflammatory bowel disease                            (1 Point)                  [ ] Central venous access, PICC or Port               (2 points)       (within the last month)                                                                [ ] Stroke (in the previous month)                        (5 Points)    [ ] Previous or present malignancy                       (2 points)                                                                                                                                                         HEMATOLOGY RELATED FACTORS                                                         [ ] Prior episodes of VTE                                     (3 Points)                     [ ] Positive family history for VTE                      (3 Points)                  [ ] Prothrombin 85020 A                                     (3 Points)                     [ ] Factor V Leiden                                                (3 Points)                        [ ] Lupus anticoagulants                                      (3 Points)                                                           [ ] Anticardiolipin antibodies                              (3 Points)                                                       [ ] High homocysteine in the blood                   (3 Points)                                             [ ] Other congenital or acquired thrombophilia      (3 Points)                                                [ ] Heparin induced thrombocytopenia                  (3 Points)                                        MOBILITY RELATED FACTORS  [ ] Bed rest                                                         (1 Point)  [ ] Plaster cast                                                    (2 points)  [ ] Bed bound for more than 72 hours           (2 Points)    GENDER SPECIFIC FACTORS  [ ] Pregnancy or had a baby within the last month   (1 Point)  [ ] Post-partum < 6 weeks                                   (1 Point)  [ ] Hormonal therapy  or oral contraception   (1 Point)  [ ] History of pregnancy complications              (1 point)  [ ] Unexplained or recurrent              (1 Point)    OTHER RISK FACTORS                                           (1 Point)  [ ] BMI >40, smoking, diabetes requiring insulin, chemotherapy  blood transfusions and length of surgery over 2 hours    SURGERY RELATED RISK FACTORS  [ ]  Section within the last month     (1 Point)  [ ] Minor surgery                                                  (1 Point)  [ ] Arthroscopic surgery                                       (2 Points)  [x ] Planned major surgery lasting more            (2 Points)      than 45 minutes     [ ] Elective hip or knee joint replacement       (5 points)       surgery                                                TRAUMA RELATED RISK FACTORS  [ ] Fracture of the hip, pelvis, or leg                       (5 Points)  [ ] Spinal cord injury resulting in paralysis             (5 points)       (in the previous month)    [ ] Paralysis  (less than 1 month)                             (5 Points)  [ ] Multiple Trauma within 1 month                        (5 Points)    Total Score [        ]    Caprini Score 0-2: Low Risk, NO VTE prophylaxis required for most patients, encourage ambulation  Caprini Score 3-6: Moderate Risk , pharmacologic VTE prophylaxis is indicated for most patients (in the absence of contraindications)  Caprini Score Greater than or =7: High risk, pharmocologic VTE prophylaxis indicated for most patients (in the absence of contraindications)      OPIOID RISK TOOL    ZACHARY EACH BOX THAT APPLIES AND ADD TOTALS AT THE END    FAMILY HISTORY OF SUBSTANCE ABUSE                 FEMALE         MALE                                                Alcohol                             [  ]1 pt          [  ]3pts                                               Illegal Durgs                     [  ]2 pts        [  ]3pts                                               Rx Drugs                           [  ]4 pts        [  ]4 pts    PERSONAL HISTORY OF SUBSTANCE ABUSE                                                                                          Alcohol                             [  ]3 pts       [  ]3 pts                                               Illegal Durgs                     [  ]4 pts        [  ]4 pts                                               Rx Drugs                           [  ]5 pts        [  ]5 pts    AGE BETWEEN 16-45 YEARS                                      [  ]1 pt         [  ]1 pt    HISTORY OF PREADOLESCENT   SEXUAL ABUSE                                                             [  ]3 pts        [  ]0pts    PSYCHOLOGICAL DISEASE                     ADD, OCD, Bipolar, Schizophrenia        [  ]2 pts         [  ]2 pts                      Depression                                               [  ]1 pt           [  ]1 pt           SCORING TOTAL   (add numbers and type here)              (***)                                     A score of 3 or lower indicated LOW risk for future opiod abuse  A score of 4 to 7 indicated moderate risk for future opiod abuse  A score of 8 or higher indicates a high risk for opiod abuse    Patient educated on surgical scrub, NPO after MN, ERP, preadmission instructions, medical clearance and day of procedure medications, verbalizes understanding.  Pt instructed to stop vitamins/supplements/herbal medications/ASA/NSAIDS for one week prior to surgery and discuss with PMD.                          CAPRINI SCORE    AGE RELATED RISK FACTORS                                                             [ ] Age 41-60 years                                            (1 Point)  [x ] Age: 61-74 years                                           (2 Points)                 [ ] Age= 75 years                                                (3 Points)             DISEASE RELATED RISK FACTORS                                                       [ ] Edema in the lower extremities                 (1 Point)                     [ ] Varicose veins                                               (1 Point)                                 [x ] BMI > 25 Kg/m2                                            (1 Point)                                  [ ] Serious infection (ie PNA)                            (1 Point)                     [ ] Lung disease ( COPD, Emphysema)            (1 Point)                                                                          [ ] Acute myocardial infarction                         (1 Point)                  [ ] Congestive heart failure (in the previous month)  (1 Point)         [ ] Inflammatory bowel disease                            (1 Point)                  [ ] Central venous access, PICC or Port               (2 points)       (within the last month)                                                                [ ] Stroke (in the previous month)                        (5 Points)    [ ] Previous or present malignancy                       (2 points)                                                                                                                                                         HEMATOLOGY RELATED FACTORS                                                         [ ] Prior episodes of VTE                                     (3 Points)                     [ ] Positive family history for VTE                      (3 Points)                  [ ] Prothrombin 21977 A                                     (3 Points)                     [ ] Factor V Leiden                                                (3 Points)                        [ ] Lupus anticoagulants                                      (3 Points)                                                           [ ] Anticardiolipin antibodies                              (3 Points)                                                       [ ] High homocysteine in the blood                   (3 Points)                                             [ ] Other congenital or acquired thrombophilia      (3 Points)                                                [ ] Heparin induced thrombocytopenia                  (3 Points)                                        MOBILITY RELATED FACTORS  [ ] Bed rest                                                         (1 Point)  [ ] Plaster cast                                                    (2 points)  [ ] Bed bound for more than 72 hours           (2 Points)    GENDER SPECIFIC FACTORS  [ ] Pregnancy or had a baby within the last month   (1 Point)  [ ] Post-partum < 6 weeks                                   (1 Point)  [ ] Hormonal therapy  or oral contraception   (1 Point)  [ ] History of pregnancy complications              (1 point)  [ ] Unexplained or recurrent              (1 Point)    OTHER RISK FACTORS                                           (1 Point)  [ ] BMI >40, smoking, diabetes requiring insulin, chemotherapy  blood transfusions and length of surgery over 2 hours    SURGERY RELATED RISK FACTORS  [ ]  Section within the last month     (1 Point)  [ ] Minor surgery                                                  (1 Point)  [ ] Arthroscopic surgery                                       (2 Points)  [x ] Planned major surgery lasting more            (2 Points)      than 45 minutes     [ ] Elective hip or knee joint replacement       (5 points)       surgery                                                TRAUMA RELATED RISK FACTORS  [ ] Fracture of the hip, pelvis, or leg                       (5 Points)  [ ] Spinal cord injury resulting in paralysis             (5 points)       (in the previous month)    [ ] Paralysis  (less than 1 month)                             (5 Points)  [ ] Multiple Trauma within 1 month                        (5 Points)    Total Score [        ]    Caprini Score 0-2: Low Risk, NO VTE prophylaxis required for most patients, encourage ambulation  Caprini Score 3-6: Moderate Risk , pharmacologic VTE prophylaxis is indicated for most patients (in the absence of contraindications)  Caprini Score Greater than or =7: High risk, pharmocologic VTE prophylaxis indicated for most patients (in the absence of contraindications)      OPIOID RISK TOOL    ZACHARY EACH BOX THAT APPLIES AND ADD TOTALS AT THE END    FAMILY HISTORY OF SUBSTANCE ABUSE                 FEMALE         MALE                                                Alcohol                             [  ]1 pt          [  ]3pts                                               Illegal Durgs                     [  ]2 pts        [  ]3pts                                               Rx Drugs                           [  ]4 pts        [  ]4 pts    PERSONAL HISTORY OF SUBSTANCE ABUSE                                                                                          Alcohol                             [  ]3 pts       [  ]3 pts                                               Illegal Durgs                     [  ]4 pts        [  ]4 pts                                               Rx Drugs                           [  ]5 pts        [  ]5 pts    AGE BETWEEN 16-45 YEARS                                      [  ]1 pt         [  ]1 pt    HISTORY OF PREADOLESCENT   SEXUAL ABUSE                                                             [  ]3 pts        [  ]0pts    PSYCHOLOGICAL DISEASE                     ADD, OCD, Bipolar, Schizophrenia        [  ]2 pts         [  ]2 pts                      Depression                                               [  ]1 pt           [  ]1 pt           SCORING TOTAL   (add numbers and type here)              (***)                                     A score of 3 or lower indicated LOW risk for future opiod abuse  A score of 4 to 7 indicated moderate risk for future opiod abuse  A score of 8 or higher indicates a high risk for opiod abuse    Patient educated on surgical scrub, NPO after MN, ERP, preadmission instructions, medical clearance and day of procedure medications, verbalizes understanding.  Pt instructed to stop vitamins/supplements/herbal medications/ASA/NSAIDS for one week prior to surgery and discuss with PMD.                          CAPRINI SCORE    AGE RELATED RISK FACTORS                                                             [ ] Age 41-60 years                                            (1 Point)  [x ] Age: 61-74 years                                           (2 Points)                 [ ] Age= 75 years                                                (3 Points)             DISEASE RELATED RISK FACTORS                                                       [x ] Edema in the lower extremities                 (1 Point)                     [ ] Varicose veins                                               (1 Point)                                 [x ] BMI > 25 Kg/m2                                            (1 Point)                                  [ ] Serious infection (ie PNA)                            (1 Point)                     [ ] Lung disease ( COPD, Emphysema)            (1 Point)                                                                          [ ] Acute myocardial infarction                         (1 Point)                  [ ] Congestive heart failure (in the previous month)  (1 Point)         [ ] Inflammatory bowel disease                            (1 Point)                  [ ] Central venous access, PICC or Port               (2 points)       (within the last month)                                                                [ ] Stroke (in the previous month)                        (5 Points)    [ ] Previous or present malignancy                       (2 points)                                                                                                                                                         HEMATOLOGY RELATED FACTORS                                                         [ ] Prior episodes of VTE                                     (3 Points)                     [ ] Positive family history for VTE                      (3 Points)                  [ ] Prothrombin 51079 A                                     (3 Points)                     [ ] Factor V Leiden                                                (3 Points)                        [ ] Lupus anticoagulants                                      (3 Points)                                                           [ ] Anticardiolipin antibodies                              (3 Points)                                                       [ ] High homocysteine in the blood                   (3 Points)                                             [ ] Other congenital or acquired thrombophilia      (3 Points)                                                [ ] Heparin induced thrombocytopenia                  (3 Points)                                        MOBILITY RELATED FACTORS  [ ] Bed rest                                                         (1 Point)  [ ] Plaster cast                                                    (2 points)  [ ] Bed bound for more than 72 hours           (2 Points)    GENDER SPECIFIC FACTORS  [ ] Pregnancy or had a baby within the last month   (1 Point)  [ ] Post-partum < 6 weeks                                   (1 Point)  [ ] Hormonal therapy  or oral contraception   (1 Point)  [ ] History of pregnancy complications              (1 point)  [ ] Unexplained or recurrent              (1 Point)    OTHER RISK FACTORS                                           (1 Point)  [ ] BMI >40, smoking, diabetes requiring insulin, chemotherapy  blood transfusions and length of surgery over 2 hours    SURGERY RELATED RISK FACTORS  [ ]  Section within the last month     (1 Point)  [ ] Minor surgery                                                  (1 Point)  [ ] Arthroscopic surgery                                       (2 Points)  [x ] Planned major surgery lasting more            (2 Points)      than 45 minutes     [ ] Elective hip or knee joint replacement       (5 points)       surgery                                                TRAUMA RELATED RISK FACTORS  [ ] Fracture of the hip, pelvis, or leg                       (5 Points)  [ ] Spinal cord injury resulting in paralysis             (5 points)       (in the previous month)    [ ] Paralysis  (less than 1 month)                             (5 Points)  [ ] Multiple Trauma within 1 month                        (5 Points)    Total Score [    5    ]    Caprini Score 0-2: Low Risk, NO VTE prophylaxis required for most patients, encourage ambulation  Caprini Score 3-6: Moderate Risk , pharmacologic VTE prophylaxis is indicated for most patients (in the absence of contraindications)  Caprini Score Greater than or =7: High risk, pharmocologic VTE prophylaxis indicated for most patients (in the absence of contraindications)      OPIOID RISK TOOL    ZACHARY EACH BOX THAT APPLIES AND ADD TOTALS AT THE END    FAMILY HISTORY OF SUBSTANCE ABUSE                 FEMALE         MALE                                                Alcohol                             [  ]1 pt          [  ]3pts                                               Illegal Durgs                     [  ]2 pts        [  ]3pts                                               Rx Drugs                           [  ]4 pts        [  ]4 pts    PERSONAL HISTORY OF SUBSTANCE ABUSE                                                                                          Alcohol                             [  ]3 pts       [  ]3 pts                                               Illegal Drugs                     [  ]4 pts        [  ]4 pts                                               Rx Drugs                           [  ]5 pts        [  ]5 pts    AGE BETWEEN 16-45 YEARS                                      [  ]1 pt         [  ]1 pt    HISTORY OF PREADOLESCENT   SEXUAL ABUSE                                                             [  ]3 pts        [  ]0pts    PSYCHOLOGICAL DISEASE                     ADD, OCD, Bipolar, Schizophrenia        [  ]2 pts         [  ]2 pts                      Depression                                               [  ]1 pt           [  ]1 pt           SCORING TOTAL  0                                A score of 3 or lower indicated LOW risk for future opiod abuse  A score of 4 to 7 indicated moderate risk for future opiod abuse  A score of 8 or higher indicates a high risk for opiod abuse    63 year old female pmhx of HTN, works as CNA at [employer] 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  Patient presents to PST today she is s/p cardioversion at [facility] 23.  Patient presents she is overall feeling well, she reports some fatigue but she denies chest pain, palpitations, dizziness, near syncope, syncope, weakness, confusion, difficulty speaking, gait instability, or any stroke like symptoms. Patient is scheduled for cerebral aneurysm on 24 with Dr Zhou.  Patient educated on surgical scrub, NPO after MN, preadmission instructions, and day of procedure medications, verbalizes understanding.  Pt instructed to stop vitamins/supplements/herbal medications/ASA/NSAIDS for one week prior to surgery and discuss with PMD.      CAPRINI SCORE    AGE RELATED RISK FACTORS                                                             [ ] Age 41-60 years                                            (1 Point)  [x ] Age: 61-74 years                                           (2 Points)                 [ ] Age= 75 years                                                (3 Points)             DISEASE RELATED RISK FACTORS                                                       [x ] Edema in the lower extremities                 (1 Point)                     [ ] Varicose veins                                               (1 Point)                                 [x ] BMI > 25 Kg/m2                                            (1 Point)                                  [ ] Serious infection (ie PNA)                            (1 Point)                     [ ] Lung disease ( COPD, Emphysema)            (1 Point)                                                                          [ ] Acute myocardial infarction                         (1 Point)                  [ ] Congestive heart failure (in the previous month)  (1 Point)         [ ] Inflammatory bowel disease                            (1 Point)                  [ ] Central venous access, PICC or Port               (2 points)       (within the last month)                                                                [ ] Stroke (in the previous month)                        (5 Points)    [ ] Previous or present malignancy                       (2 points)                                                                                                                                                         HEMATOLOGY RELATED FACTORS                                                         [ ] Prior episodes of VTE                                     (3 Points)                     [ ] Positive family history for VTE                      (3 Points)                  [ ] Prothrombin 96471 A                                     (3 Points)                     [ ] Factor V Leiden                                                (3 Points)                        [ ] Lupus anticoagulants                                      (3 Points)                                                           [ ] Anticardiolipin antibodies                              (3 Points)                                                       [ ] High homocysteine in the blood                   (3 Points)                                             [ ] Other congenital or acquired thrombophilia      (3 Points)                                                [ ] Heparin induced thrombocytopenia                  (3 Points)                                        MOBILITY RELATED FACTORS  [ ] Bed rest                                                         (1 Point)  [ ] Plaster cast                                                    (2 points)  [ ] Bed bound for more than 72 hours           (2 Points)    GENDER SPECIFIC FACTORS  [ ] Pregnancy or had a baby within the last month   (1 Point)  [ ] Post-partum < 6 weeks                                   (1 Point)  [ ] Hormonal therapy  or oral contraception   (1 Point)  [ ] History of pregnancy complications              (1 point)  [ ] Unexplained or recurrent              (1 Point)    OTHER RISK FACTORS                                           (1 Point)  [ ] BMI >40, smoking, diabetes requiring insulin, chemotherapy  blood transfusions and length of surgery over 2 hours    SURGERY RELATED RISK FACTORS  [ ]  Section within the last month     (1 Point)  [ ] Minor surgery                                                  (1 Point)  [ ] Arthroscopic surgery                                       (2 Points)  [x ] Planned major surgery lasting more            (2 Points)      than 45 minutes     [ ] Elective hip or knee joint replacement       (5 points)       surgery                                                TRAUMA RELATED RISK FACTORS  [ ] Fracture of the hip, pelvis, or leg                       (5 Points)  [ ] Spinal cord injury resulting in paralysis             (5 points)       (in the previous month)    [ ] Paralysis  (less than 1 month)                             (5 Points)  [ ] Multiple Trauma within 1 month                        (5 Points)    Total Score [    5    ]    Caprini Score 0-2: Low Risk, NO VTE prophylaxis required for most patients, encourage ambulation  Caprini Score 3-6: Moderate Risk , pharmacologic VTE prophylaxis is indicated for most patients (in the absence of contraindications)  Caprini Score Greater than or =7: High risk, pharmocologic VTE prophylaxis indicated for most patients (in the absence of contraindications)      OPIOID RISK TOOL    ZACHARY EACH BOX THAT APPLIES AND ADD TOTALS AT THE END    FAMILY HISTORY OF SUBSTANCE ABUSE                 FEMALE         MALE                                                Alcohol                             [  ]1 pt          [  ]3pts                                               Illegal Durgs                     [  ]2 pts        [  ]3pts                                               Rx Drugs                           [  ]4 pts        [  ]4 pts    PERSONAL HISTORY OF SUBSTANCE ABUSE                                                                                          Alcohol                             [  ]3 pts       [  ]3 pts                                               Illegal Drugs                     [  ]4 pts        [  ]4 pts                                               Rx Drugs                           [  ]5 pts        [  ]5 pts    AGE BETWEEN 16-45 YEARS                                      [  ]1 pt         [  ]1 pt    HISTORY OF PREADOLESCENT   SEXUAL ABUSE                                                             [  ]3 pts        [  ]0pts    PSYCHOLOGICAL DISEASE                     ADD, OCD, Bipolar, Schizophrenia        [  ]2 pts         [  ]2 pts                      Depression                                               [  ]1 pt           [  ]1 pt           SCORING TOTAL  0                                A score of 3 or lower indicated LOW risk for future opiod abuse  A score of 4 to 7 indicated moderate risk for future opiod abuse  A score of 8 or higher indicates a high risk for opiod abuse    63 year old female pmhx of HTN, 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  Patient presents to PST today she is s/p cardioversion at [facility] 23.  Patient presents she is overall feeling well, she reports some fatigue but she denies chest pain, palpitations, dizziness, near syncope, syncope, weakness, confusion, difficulty speaking, gait instability, or any stroke like symptoms. Patient is scheduled for cerebral aneurysm on 24 with Dr Zhou.  Patient educated on surgical scrub, NPO after MN, preadmission instructions, and day of procedure medications, verbalizes understanding.  Pt instructed to stop vitamins/supplements/herbal medications/ASA/NSAIDS for one week prior to surgery and discuss with PMD.

## 2024-01-11 ENCOUNTER — APPOINTMENT (OUTPATIENT)
Dept: NEUROLOGY | Facility: HOSPITAL | Age: 64
End: 2024-01-11

## 2024-01-11 ENCOUNTER — TRANSCRIPTION ENCOUNTER (OUTPATIENT)
Age: 64
End: 2024-01-11

## 2024-01-11 ENCOUNTER — OUTPATIENT (OUTPATIENT)
Dept: OUTPATIENT SERVICES | Facility: HOSPITAL | Age: 64
LOS: 1 days | End: 2024-01-11
Payer: COMMERCIAL

## 2024-01-11 VITALS
SYSTOLIC BLOOD PRESSURE: 138 MMHG | DIASTOLIC BLOOD PRESSURE: 65 MMHG | RESPIRATION RATE: 18 BRPM | HEART RATE: 63 BPM | OXYGEN SATURATION: 96 %

## 2024-01-11 VITALS
HEIGHT: 66 IN | DIASTOLIC BLOOD PRESSURE: 67 MMHG | WEIGHT: 225.09 LBS | HEART RATE: 55 BPM | RESPIRATION RATE: 17 BRPM | OXYGEN SATURATION: 95 % | SYSTOLIC BLOOD PRESSURE: 142 MMHG | TEMPERATURE: 98 F

## 2024-01-11 DIAGNOSIS — R90 ABNORMAL FINDINGS ON DIAGNOSTIC IMAGING OF CENTRAL NERVOUS SYSTEM: ICD-10-CM

## 2024-01-11 DIAGNOSIS — Z92.89 PERSONAL HISTORY OF OTHER MEDICAL TREATMENT: Chronic | ICD-10-CM

## 2024-01-11 DIAGNOSIS — Z98.890 OTHER SPECIFIED POSTPROCEDURAL STATES: Chronic | ICD-10-CM

## 2024-01-11 DIAGNOSIS — I67.1 CEREBRAL ANEURYSM, NONRUPTURED: ICD-10-CM

## 2024-01-11 LAB
ABO RH CONFIRMATION: SIGNIFICANT CHANGE UP
ABO RH CONFIRMATION: SIGNIFICANT CHANGE UP

## 2024-01-11 PROCEDURE — 36227 PLACE CATH XTRNL CAROTID: CPT

## 2024-01-11 PROCEDURE — 36226 PLACE CATH VERTEBRAL ART: CPT

## 2024-01-11 PROCEDURE — 36224 PLACE CATH CAROTD ART: CPT

## 2024-01-11 PROCEDURE — C1894: CPT

## 2024-01-11 PROCEDURE — C1887: CPT

## 2024-01-11 PROCEDURE — 36415 COLL VENOUS BLD VENIPUNCTURE: CPT

## 2024-01-11 PROCEDURE — 36226 PLACE CATH VERTEBRAL ART: CPT | Mod: LT

## 2024-01-11 PROCEDURE — 36224 PLACE CATH CAROTD ART: CPT | Mod: 50

## 2024-01-11 PROCEDURE — 76377 3D RENDER W/INTRP POSTPROCES: CPT

## 2024-01-11 PROCEDURE — 36225 PLACE CATH SUBCLAVIAN ART: CPT

## 2024-01-11 PROCEDURE — 36227 PLACE CATH XTRNL CAROTID: CPT | Mod: 50

## 2024-01-11 PROCEDURE — 36225 PLACE CATH SUBCLAVIAN ART: CPT | Mod: 59,RT

## 2024-01-11 PROCEDURE — 76377 3D RENDER W/INTRP POSTPROCES: CPT | Mod: 26

## 2024-01-11 PROCEDURE — C1769: CPT

## 2024-01-11 RX ORDER — SODIUM CHLORIDE 9 MG/ML
1000 INJECTION INTRAMUSCULAR; INTRAVENOUS; SUBCUTANEOUS
Refills: 0 | Status: DISCONTINUED | OUTPATIENT
Start: 2024-01-11 | End: 2024-01-25

## 2024-01-11 RX ADMIN — SODIUM CHLORIDE 60 MILLILITER(S): 9 INJECTION INTRAMUSCULAR; INTRAVENOUS; SUBCUTANEOUS at 11:17

## 2024-01-11 NOTE — ASU PATIENT PROFILE, ADULT - FALL HARM RISK - HARM RISK INTERVENTIONS
Communicate Risk of Fall with Harm to all staff/Reinforce activity limits and safety measures with patient and family/Tailored Fall Risk Interventions/Visual Cue: Yellow wristband and red socks/Bed in lowest position, wheels locked, appropriate side rails in place/Call bell, personal items and telephone in reach/Instruct patient to call for assistance before getting out of bed or chair/Non-slip footwear when patient is out of bed/[place] to call system/Physically safe environment - no spills, clutter or unnecessary equipment/Purposeful Proactive Rounding/Room/bathroom lighting operational, light cord in reach Communicate Risk of Fall with Harm to all staff/Reinforce activity limits and safety measures with patient and family/Tailored Fall Risk Interventions/Visual Cue: Yellow wristband and red socks/Bed in lowest position, wheels locked, appropriate side rails in place/Call bell, personal items and telephone in reach/Instruct patient to call for assistance before getting out of bed or chair/Non-slip footwear when patient is out of bed/[place] to call system/Physically safe environment - no spills, clutter or unnecessary equipment/Purposeful Proactive Rounding/Room/bathroom lighting operational, light cord in reach

## 2024-01-11 NOTE — ASU DISCHARGE PLAN (ADULT/PEDIATRIC) - CARE PROVIDER_API CALL
Ko Zhou  Interventional Neuroradiology  [address] [place] [id number]  Phone: [phone]  Fax: [phone]  Follow Up Time: 2 weeks   Ko Zhou  Interventional Neuroradiology  [address] [place] [address]  Phone: [phone]  Fax: [phone]  Follow Up Time: 2 weeks

## 2024-01-11 NOTE — ASU DISCHARGE PLAN (ADULT/PEDIATRIC) - NS MD DC FALL RISK RISK
For information on Fall & Injury Prevention, visit: [email or web address] OR  [email or web address] OR  https://www.cdc.gov/steadi/patient.html For information on Fall & Injury Prevention, visit: [email or web address] OR  [email or web address] OR  https://www.cdc.gov/steadi/patient.html

## 2024-01-11 NOTE — CHART NOTE - NSCHARTNOTEFT_GEN_A_CORE
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 Patient seen by Dr. Zhou in the office for further work up, patient presenting today for cerebral angiogram. Patient denies h/a, nausea/vomiting, weakness, numbness/tingling, CP, SOB.     Allergies: azithromycin (Hives; Rash)  penicillin (Hives; Rash)      PMH/PSH:  PAST MEDICAL & SURGICAL HISTORY:  HTN (hypertension  CVA (cerebrovascular accident)  Afib  Carpal tunnel syndrome  Cerebral aneurysm  H/O carpal tunnel repair  History of cardioversion    FAMILY HISTORY:  Family history of stroke or transient ischemic attack in mother (Mother, Sibling)  FH: diabetes mellitus (Mother)    Physical Exam:  Constitutional: NAD, lying in bed  Neuro  * Mental Status:  GCS 15: Awake, alert, oriented to conversation. No aphasia or difficulty speaking. No dysarthria. Able to name objects and their function.  * Cranial Nerves: Cnii-Cnxii grossly intact. PERRL, EOMI, tongue midline, no gaze deviation  * Motor: RUE 5/5, LUE 5/5, RLE 5/5, LLE 5/5, no drift or dysmetria  * Sensory: Sensation intact to light touch  * Reflexes: not assessed   Cardiovascular: Regular rate and rhythm.  Eyes: See neurologic examination with detailed examination of eyes.  ENT: No JVD, Trachea Midline  Respiratory: non labored breathing   Gastrointestinal: Soft, nontender, nondistended.  Genitourinary: [ ] [place], [ x ] No Emery.   Musculoskeletal: No muscle wasting noted, (See neurologic assessment for full muscle strength assessment)  Skin:  no wounds, no redness, no abrasions noted  Hematologic / Lymph / Immunologic: No bleeding from IV sites or wounds     NIH SS:  DATE: 1/11/23  TIME:  1A: Level of consciousness (0-3): 0  1B: Questions (0-2): 0    1C: Commands (0-2): 0  2: Gaze (0-2): 0  3: Visual fields (0-3): 0  4: Facial palsy (0-3): 0  MOTOR:  5A: Left arm motor drift (0-4): 0  5B: Right arm motor drift (0-4): 0  6A: Left leg motor drift (0-4): 0  6B: Right leg motor drift (0-4): 0  7: Limb ataxia (0-2): 0  SENSORY:  8: Sensation (0-2): 0  SPEECH:  9: Language (0-3): 0  10: Dysarthria (0-2): 0  EXTINCTION:  11: Extinction/inattention (0-2): 0    TOTAL SCORE:     Labs:   1/5/23: WBC 9.38/ Hg 13.8/ Hct 41.8/ Plt 305  1/5/23: Pt 34.5/ PTT 15.0/ INR 1.36  1/5/23: Na 142/ K 4.7/ Cl 104/ Co2 27/ BUN 18.8/ Cr 0.69/ Gl 92/ Ca 9.6/     Assessment/Plan:   This is a 63y  year old Female  presents with 4 mm anterior communicating artery aneurysm. Patient presents to neuro-IR for selective cerebral angiography.     Procedure, goals, risks, benefits and alternatives  were discussed with patient.  All questions were answered.  Risks include but are not limited to stroke, vessel injury, hemorrhage, and or groin hematoma.  Patient demonstrates understanding  of all risks involved with this procedure and wishes to continue.   Appropriate  consent was obtained from patient and consent is in the patient's chart. 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 Patient seen by Dr. Zhou in the office for further work up, patient presenting today for cerebral angiogram. Patient denies h/a, nausea/vomiting, weakness, numbness/tingling, CP, SOB.     Allergies: azithromycin (Hives; Rash)  penicillin (Hives; Rash)      PMH/PSH:  PAST MEDICAL & SURGICAL HISTORY:  HTN (hypertension  CVA (cerebrovascular accident)  Afib  Carpal tunnel syndrome  Cerebral aneurysm  H/O carpal tunnel repair  History of cardioversion    FAMILY HISTORY:  Family history of stroke or transient ischemic attack in mother (Mother, Sibling)  FH: diabetes mellitus (Mother)    Physical Exam:  Constitutional: NAD, lying in bed  Neuro  * Mental Status:  GCS 15: Awake, alert, oriented to conversation. No aphasia or difficulty speaking. No dysarthria. Able to name objects and their function.  * Cranial Nerves: Cnii-Cnxii grossly intact. PERRL, EOMI, tongue midline, no gaze deviation  * Motor: RUE 5/5, LUE 5/5, RLE 5/5, LLE 5/5, no drift or dysmetria  * Sensory: Sensation intact to light touch  * Reflexes: not assessed   Cardiovascular: Regular rate and rhythm.  Eyes: See neurologic examination with detailed examination of eyes.  ENT: No JVD, Trachea Midline  Respiratory: non labored breathing   Gastrointestinal: Soft, nontender, nondistended.  Genitourinary: [ ] Emery, [ x ] No Emery.   Musculoskeletal: No muscle wasting noted, (See neurologic assessment for full muscle strength assessment)  Skin:  no wounds, no redness, no abrasions noted  Hematologic / Lymph / Immunologic: No bleeding from IV sites or wounds     NIH SS:  DATE: 1/11/23  TIME:  1A: Level of consciousness (0-3): 0  1B: Questions (0-2): 0    1C: Commands (0-2): 0  2: Gaze (0-2): 0  3: Visual fields (0-3): 0  4: Facial palsy (0-3): 0  MOTOR:  5A: Left arm motor drift (0-4): 0  5B: Right arm motor drift (0-4): 0  6A: Left leg motor drift (0-4): 0  6B: Right leg motor drift (0-4): 0  7: Limb ataxia (0-2): 0  SENSORY:  8: Sensation (0-2): 0  SPEECH:  9: Language (0-3): 0  10: Dysarthria (0-2): 0  EXTINCTION:  11: Extinction/inattention (0-2): 0    TOTAL SCORE:     Labs:   1/5/23: WBC 9.38/ Hg 13.8/ Hct 41.8/ Plt 305  1/5/23: Pt 34.5/ PTT 15.0/ INR 1.36  1/5/23: Na 142/ K 4.7/ Cl 104/ Co2 27/ BUN 18.8/ Cr 0.69/ Gl 92/ Ca 9.6/     Assessment/Plan:   This is a 63y  year old Female  presents with 4 mm anterior communicating artery aneurysm. Patient presents to neuro-IR for selective cerebral angiography.     Procedure, goals, risks, benefits and alternatives  were discussed with patient.  All questions were answered.  Risks include but are not limited to stroke, vessel injury, hemorrhage, and or groin hematoma.  Patient demonstrates understanding  of all risks involved with this procedure and wishes to continue.   Appropriate  consent was obtained from patient and consent is in the patient's chart. 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 Patient seen by Dr. Zhou in the office for further work up, patient presenting today for cerebral angiogram. Patient denies h/a, nausea/vomiting, weakness, numbness/tingling, CP, SOB.     Allergies: azithromycin (Hives; Rash)  penicillin (Hives; Rash)      PMH/PSH:  PAST MEDICAL & SURGICAL HISTORY:  HTN (hypertension  CVA (cerebrovascular accident)  Afib  Carpal tunnel syndrome  Cerebral aneurysm  H/O carpal tunnel repair  History of cardioversion    FAMILY HISTORY:  Family history of stroke or transient ischemic attack in mother (Mother, Sibling)  FH: diabetes mellitus (Mother)    Physical Exam:  Constitutional: NAD, lying in bed  Neuro  * Mental Status:  GCS 15: Awake, alert, oriented to conversation. No aphasia or difficulty speaking. No dysarthria. Able to name objects and their function.  * Cranial Nerves: Cnii-Cnxii grossly intact. PERRL, EOMI, tongue midline, no gaze deviation  * Motor: RUE 5/5, LUE 5/5, RLE 5/5, LLE 5/5, no drift or dysmetria  * Sensory: Sensation intact to light touch  * Reflexes: not assessed   Cardiovascular: Regular rate and rhythm.  Eyes: See neurologic examination with detailed examination of eyes.  ENT: No JVD, Trachea Midline  Respiratory: non labored breathing   Gastrointestinal: Soft, nontender, nondistended.  Genitourinary: [ ] Emery, [ x ] No Emery.   Musculoskeletal: No muscle wasting noted, (See neurologic assessment for full muscle strength assessment)  Skin:  no wounds, no redness, no abrasions noted  Hematologic / Lymph / Immunologic: No bleeding from IV sites or wounds     NIH SS:  DATE: 1/11/23  TIME: 07:15  1A: Level of consciousness (0-3): 0  1B: Questions (0-2): 0    1C: Commands (0-2): 0  2: Gaze (0-2): 0  3: Visual fields (0-3): 0  4: Facial palsy (0-3): 0  MOTOR:  5A: Left arm motor drift (0-4): 0  5B: Right arm motor drift (0-4): 0  6A: Left leg motor drift (0-4): 0  6B: Right leg motor drift (0-4): 0  7: Limb ataxia (0-2): 0  SENSORY:  8: Sensation (0-2): 0  SPEECH:  9: Language (0-3): 0  10: Dysarthria (0-2): 0  EXTINCTION:  11: Extinction/inattention (0-2): 0    TOTAL SCORE: 0    Labs:   1/5/23: WBC 9.38/ Hg 13.8/ Hct 41.8/ Plt 305  1/5/23: Pt 34.5/ PTT 15.0/ INR 1.36  1/5/23: Na 142/ K 4.7/ Cl 104/ Co2 27/ BUN 18.8/ Cr 0.69/ Gl 92/ Ca 9.6/     Assessment/Plan:   This is a 63y  year old Female  presents with 4 mm anterior communicating artery aneurysm. Patient presents to neuro-IR for selective cerebral angiography.     Procedure, goals, risks, benefits and alternatives  were discussed with patient.  All questions were answered.  Risks include but are not limited to stroke, vessel injury, hemorrhage, and or groin hematoma.  Patient demonstrates understanding  of all risks involved with this procedure and wishes to continue.   Appropriate  consent was obtained from patient and consent is in the patient's chart. 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 Patient seen by Dr. Zhou in the office for further work up, patient presenting today for cerebral angiogram. Patient denies h/a, nausea/vomiting, weakness, numbness/tingling, CP, SOB.     Allergies: azithromycin (Hives; Rash)  penicillin (Hives; Rash)      PMH/PSH:  PAST MEDICAL & SURGICAL HISTORY:  HTN (hypertension  CVA (cerebrovascular accident)  Afib  Carpal tunnel syndrome  Cerebral aneurysm  H/O carpal tunnel repair  History of cardioversion    FAMILY HISTORY:  Family history of stroke or transient ischemic attack in mother (Mother, Sibling)  FH: diabetes mellitus (Mother)    Physical Exam:  Constitutional: NAD, lying in bed  Neuro  * Mental Status:  GCS 15: Awake, alert, oriented to conversation. No aphasia or difficulty speaking. No dysarthria. Able to name objects and their function.  * Cranial Nerves: Cnii-Cnxii grossly intact. PERRL, EOMI, tongue midline, no gaze deviation  * Motor: RUE 5/5, LUE 5/5, RLE 5/5, LLE 5/5, no drift or dysmetria  * Sensory: Sensation intact to light touch  * Reflexes: not assessed   Cardiovascular: Regular rate and rhythm.  Eyes: See neurologic examination with detailed examination of eyes.  ENT: No JVD, Trachea Midline  Respiratory: non labored breathing   Gastrointestinal: Soft, nontender, nondistended.  Genitourinary: [ ] Emery, [ x ] No Emery.   Musculoskeletal: No muscle wasting noted, (See neurologic assessment for full muscle strength assessment)  Skin:  no wounds, no redness, no abrasions noted  Hematologic / Lymph / Immunologic: No bleeding from IV sites or wounds     NIH SS:  DATE: 1/11/23  TIME: 07:15  1A: Level of consciousness (0-3): 0  1B: Questions (0-2): 0    1C: Commands (0-2): 0  2: Gaze (0-2): 0  3: Visual fields (0-3): 0  4: Facial palsy (0-3): 0  MOTOR:  5A: Left arm motor drift (0-4): 0  5B: Right arm motor drift (0-4): 0  6A: Left leg motor drift (0-4): 0  6B: Right leg motor drift (0-4): 0  7: Limb ataxia (0-2): 0  SENSORY:  8: Sensation (0-2): 0  SPEECH:  9: Language (0-3): 0  10: Dysarthria (0-2): 0  EXTINCTION:  11: Extinction/inattention (0-2): 0    TOTAL SCORE: 0    Labs:   1/5/23: WBC 9.38/ Hg 13.8/ Hct 41.8/ Plt 305  1/5/23: Pt 34.5/ PTT 15.0/ INR 1.36  1/5/23: Na 142/ K 4.7/ Cl 104/ Co2 27/ BUN 18.8/ Cr 0.69/ Gl 92/ Ca 9.6/     Assessment/Plan:   This is a 63y  year old Female  presents with 4 mm anterior communicating artery aneurysm. Patient presents to neuro-IR for selective cerebral angiography.     Procedure, goals, risks, benefits and alternatives  were discussed with patient.  All questions were answered.  Risks include but are not limited to stroke, vessel injury, hemorrhage, and or groin hematoma.  Patient demonstrates understanding  of all risks involved with this procedure and wishes to continue.   Appropriate  consent was obtained from patient and consent is in the patient's chart.

## 2024-01-11 NOTE — ASU PATIENT PROFILE, ADULT - NS PRO AD PATIENT TYPE
dtr  joselito villegas  [phone]/Health Care Proxy (HCP) dtr  joselito villegas  [phone]/Health Care Proxy (HCP)

## 2024-01-11 NOTE — ASU DISCHARGE PLAN (ADULT/PEDIATRIC) - PROVIDER TOKENS
PROVIDER:[TOKEN:[[id number]],FOLLOWUP:[2 weeks]] PROVIDER:[TOKEN:[[id number]],FOLLOWUP:[2 weeks]]

## 2024-01-11 NOTE — CHART NOTE - NSCHARTNOTEFT_GEN_A_CORE
Neurointerventional Surgery Post Procedure Note    Procedure: Selective Cerebral Angiography     Pre- Procedure Diagnosis: 4 mm Acomm Aneurysm   Post- Procedure Diagnosis: Acomm Aneurysm         : Dr. Abelardo MD  Nurse Practitioner: Yanelis Fischer NP   Nurse: REUBEN Chahal   Anesthesiologist: Dr. Granados                                 Radiology Tech: Cresencio VALE   Fellow: Jitendra Moura MD     Sheath:  4 Cambodian Sheath    I/Os: estimated blood loss less than 10cc,  IV fluids  250 cc, Urine output 0 cc, Contrast: Omnipaque 240  116  cc,     Vitals:  /86  HR 57   Spo2 95 %    Preliminary Report:  Under a 4 Cambodian short sheath via the right groin under MAC sedation via left vertebral artery, left internal carotid artery, left external carotid artery, right subclavian artery, right internal carotid artery, right external carotid artery, a selective cerebral angiography  was performed and reveals acomm aneurysm. ( Official note to follow).    Patient tolerated procedure well.  Patient remains hemodynamically stable, no change in neurological status compared to baseline.  Results were discussed with patient, patient's family and Neurosurgery.  Right groin sheath  was discontinued at 09:29. Hemostasis was obtained with approximately 16 minutes of manual compression.     No active bleeding, no hematoma, no ecchymosis.   Quick clot and safeguard balloon dressing applied at 09:45   Patient transferred to recovery room in stable condition. Neurointerventional Surgery Post Procedure Note    Procedure: Selective Cerebral Angiography     Pre- Procedure Diagnosis: 4 mm Acomm Aneurysm   Post- Procedure Diagnosis: Acomm Aneurysm         : Dr. Abelardo MD  Nurse Practitioner: Yanelis Fischer NP   Nurse: REUBEN Chahal   Anesthesiologist: Dr. Granados                                 Radiology Tech: Cresencio VALE   Fellow: Jitendra Moura MD     Sheath:  4 [place] Sheath    I/Os: estimated blood loss less than 10cc,  IV fluids  250 cc, Urine output 0 cc, Contrast: Omnipaque 240  116  cc,     Vitals:  /86  HR 57   Spo2 95 %    Preliminary Report:  Under a 4 Boliv[place] short sheath via the right groin under MAC sedation via left vertebral artery, left internal carotid artery, left external carotid artery, right subclavian artery, right internal carotid artery, right external carotid artery, a selective cerebral angiography  was performed and reveals acomm aneurysm. ( Official note to follow).    Patient tolerated procedure well.  Patient remains hemodynamically stable, no change in neurological status compared to baseline.  Results were discussed with patient, patient's family and Neurosurgery.  Right groin sheath  was discontinued at 09:29. Hemostasis was obtained with approximately 16 minutes of manual compression.     No active bleeding, no hematoma, no ecchymosis.   Quick clot and safeguard balloon dressing applied at 09:45   Patient transferred to recovery room in stable condition. Neurointerventional Surgery Post Procedure Note    Procedure: Selective Cerebral Angiography     Pre- Procedure Diagnosis: 4 mm Acomm Aneurysm   Post- Procedure Diagnosis: Acomm Aneurysm     : Dr. Abelardo MD  Nurse Practitioner: Yanelis Fischer NP   Nurse: REUBEN Gilliam    Anesthesiologist: Dr. Granados                                 Radiology Tech: Cresencio VALE   Fellow: Jitendra Moura MD     Sheath:  4 Stateless Sheath    I/Os: estimated blood loss less than 10cc,  IV fluids  250 cc, Urine output 0 cc, Contrast: Omnipaque 240  116  cc,     Vitals:  /86  HR 57   Spo2 95 %    Preliminary Report:  Under a 4 Stateless short sheath via the right groin under MAC sedation via left vertebral artery, left internal carotid artery, left external carotid artery, right subclavian artery, right internal carotid artery, right external carotid artery, a selective cerebral angiography  was performed and reveals acomm aneurysm. ( Official note to follow).    Patient tolerated procedure well.  Patient remains hemodynamically stable, no change in neurological status compared to baseline.  Results were discussed with patient, patient's family and Neurosurgery.  Right groin sheath  was discontinued at 09:29. Hemostasis was obtained with approximately 16 minutes of manual compression.     No active bleeding, no hematoma, no ecchymosis.   Quick clot and safeguard balloon dressing applied at 09:45   Patient transferred to recovery room in stable condition. Neurointerventional Surgery Post Procedure Note    Procedure: Selective Cerebral Angiography     Pre- Procedure Diagnosis: 4 mm Acomm Aneurysm   Post- Procedure Diagnosis: Acomm Aneurysm     : Dr. Abelardo MD  Nurse Practitioner: Yanelis Fischer NP   Nurse: REUBEN Gilliam    Anesthesiologist: Dr. Granados                                 Radiology Tech: Cresencio VALE   Fellow: Jitendra Moura MD     Sheath:  4 Syrian Sheath    I/Os: estimated blood loss less than 10cc,  IV fluids  250 cc, Urine output 0 cc, Contrast: Omnipaque 240  116  cc,     Vitals:  /86  HR 57   Spo2 95 %    Preliminary Report:  Under a 4 Syrian short sheath via the right groin under MAC sedation via left vertebral artery, left internal carotid artery, left external carotid artery, right subclavian artery, right internal carotid artery, right external carotid artery, a selective cerebral angiography  was performed and reveals acomm aneurysm. ( Official note to follow).    Patient tolerated procedure well.  Patient remains hemodynamically stable, no change in neurological status compared to baseline.  Results were discussed with patient, patient's family and Neurosurgery.  Right groin sheath  was discontinued at 09:29. Hemostasis was obtained with approximately 16 minutes of manual compression.     No active bleeding, no hematoma, no ecchymosis.   Quick clot and safeguard balloon dressing applied at 09:45   Patient transferred to recovery room in stable condition.

## 2024-01-11 NOTE — ASU PATIENT PROFILE, ADULT - NSICDXPASTMEDICALHX_GEN_ALL_CORE_FT
PAST MEDICAL HISTORY:  Afib     Carpal tunnel syndrome     Cerebral aneurysm     CVA (cerebrovascular accident)     HTN (hypertension)

## 2024-01-11 NOTE — DISCHARGE NOTE NURSING/CASE MANAGEMENT/SOCIAL WORK - NSDCPEFALRISK_GEN_ALL_CORE
For information on Fall & Injury Prevention, visit: [email or web address] OR  [email or web address] OR  https://www.cdc.gov/steadi/patient.html For information on Fall & Injury Prevention, visit: [email or web address] OR  [email or web address] OR  https://www.cdc.gov/steadi/patient.html

## 2024-01-11 NOTE — CHART NOTE - NSCHARTNOTESELECT_GEN_ALL_CORE
Neurointerventional Surgery Post-Procedure Note/Event Note
Neurointerventional Surgery Pre-Procedure Note/Event Note

## 2024-01-16 PROBLEM — I48.91 UNSPECIFIED ATRIAL FIBRILLATION: Chronic | Status: ACTIVE | Noted: 2024-01-05

## 2024-01-17 PROBLEM — I67.1 CEREBRAL ANEURYSM, NONRUPTURED: Chronic | Status: ACTIVE | Noted: 2024-01-05

## 2024-01-17 PROBLEM — I63.9 CEREBRAL INFARCTION, UNSPECIFIED: Chronic | Status: ACTIVE | Noted: 2024-01-05

## 2024-01-19 ENCOUNTER — APPOINTMENT (OUTPATIENT)
Dept: NEUROLOGY | Facility: CLINIC | Age: 64
End: 2024-01-19
Payer: COMMERCIAL

## 2024-01-19 VITALS
HEIGHT: 66 IN | HEART RATE: 54 BPM | OXYGEN SATURATION: 99 % | BODY MASS INDEX: 36.16 KG/M2 | SYSTOLIC BLOOD PRESSURE: 167 MMHG | DIASTOLIC BLOOD PRESSURE: 83 MMHG | WEIGHT: 225 LBS

## 2024-01-19 PROCEDURE — G2211 COMPLEX E/M VISIT ADD ON: CPT

## 2024-01-19 PROCEDURE — 99215 OFFICE O/P EST HI 40 MIN: CPT

## 2024-01-19 NOTE — DISCUSSION/SUMMARY
[FreeTextEntry1] : 63-year-old woman with pmHx of HTN, hospitalized at [facility] 7/1/2023 as a transfer from [facility] found to have a right posterior cerebral artery occlusion status post intravenous thrombolysis with tenecteplase and successful TICI3 revascularization with mechanical thrombectomy, newly diagnosed atrial fibrillation anticoagulated with Eliquis, and an incidental, unruptured 5.8 mm multilobulated anterior communicating artery aneurysm.  She recently underwent diagnostic cerebral angiogram for AComm aneurysm on January 11, 2024. It showed a 5.8 mm tall x 5.4 mm wide x 2.8 mm neck anteriorly and medially projecting multilobulated (5 small lobules) saccular aneurysm arising from anterior communicating artery encompassing the right A2 segment within its neck. She is doing well post procedure. Groin site healed well.   Given the size, location and morphology of the aneurysm, I recommended treatment of the aneurysm with endovascular embolization.   I discussed with the patient the details of the procedure. The benefits, alternatives and risks of cerebral angiography with embolization of intracranial aneurysm with possible vascular remodeling assistance (balloon/stenting) and possible flow diversion were explained in detail including potentially debilitating ischemic stroke, arterial dissection, disabling or fatal intracranial hemorrhage, contrast reaction, radiation exposure, and the possibility of death. No guarantees for the success of neurointerventional procedure were made. The patient was in agreement with plan and conveyed good understanding.  Neurologically she is recovering well with return of her vision. She has residual fatigue and is dealing with depression and anxiety post stroke. She will continue Eliquis 5 mg BID and atorvastatin 20 mg daily for secondary stroke prevention. I did  the patient about the increased risk of bleeding on concomitant therapy with Eliquis and clopidogrel. She conveys good understanding.   Patient was educated about signs and symptoms of stroke and was counseled to contact 911 upon emergence of stroke like symptoms. Intravenous thrombolysis and mechanical thrombectomy was also counseled and educated to the patient today.   PLAN: 1. Continue Eliquis 5mg BID 2. Continue atorvastatin 20mg at bedtime. 3. Patient was counseled to avoid significant valsalva at this time.  4. Plan for cerebral angiogram with AComm aneurysm embolization on February 8, 2024 5. Start Plavix 75 mg daily 1 week prior to the procedure 6. Not cleared to work at this time, will re-evaluate at follow up post aneurysm treatment.     Follow up in 1 month, or sooner if needed.   All of the patient's questions and concerns were addressed.

## 2024-01-19 NOTE — PHYSICAL EXAM
[FreeTextEntry1] : GENERAL APPEARANCE: Well developed, well-nourished woman in no acute distress. Tearful at times.  NEUROLOGIC EXAM: MENTAL STATUS: Alert and Oriented to person, place and time. Speech is fluent, without aphasia or dysarthria. CRANIAL NERVES: CN 2: Visual fields are full to confrontation. CN 3, 4, 6: Extraocular movements are intact. No nystagmus or ophthalmoplegia is evident. Pupils are equally round and reactive to light. CN 5: Facial sensation is intact to light touch in all 3 divisions. CN 7: Facial excursion is full and symmetric bilaterally. MOTOR: Strength is 5/5 throughout for age and stature. No orbiting or drift noted. SENSORY: Intact to light touch and perception in all four extremities without extinction to double simultaneous stimulation. COORD: Finger to nose testing without dysmetria bilaterally. GAIT: Normal station and mildly wobbly gait.  NIHSS-0 mRS- 1

## 2024-01-26 ENCOUNTER — OUTPATIENT (OUTPATIENT)
Dept: OUTPATIENT SERVICES | Facility: HOSPITAL | Age: 64
LOS: 1 days | End: 2024-01-26
Payer: COMMERCIAL

## 2024-01-26 VITALS
HEART RATE: 65 BPM | DIASTOLIC BLOOD PRESSURE: 70 MMHG | WEIGHT: 223.77 LBS | RESPIRATION RATE: 16 BRPM | TEMPERATURE: 98 F | SYSTOLIC BLOOD PRESSURE: 150 MMHG | HEIGHT: 66 IN | OXYGEN SATURATION: 96 %

## 2024-01-26 DIAGNOSIS — I10 ESSENTIAL (PRIMARY) HYPERTENSION: ICD-10-CM

## 2024-01-26 DIAGNOSIS — Z01.818 ENCOUNTER FOR OTHER PREPROCEDURAL EXAMINATION: ICD-10-CM

## 2024-01-26 DIAGNOSIS — I67.1 CEREBRAL ANEURYSM, NONRUPTURED: ICD-10-CM

## 2024-01-26 DIAGNOSIS — Z92.89 PERSONAL HISTORY OF OTHER MEDICAL TREATMENT: Chronic | ICD-10-CM

## 2024-01-26 DIAGNOSIS — I63.9 CEREBRAL INFARCTION, UNSPECIFIED: ICD-10-CM

## 2024-01-26 DIAGNOSIS — Z98.890 OTHER SPECIFIED POSTPROCEDURAL STATES: Chronic | ICD-10-CM

## 2024-01-26 DIAGNOSIS — I48.91 UNSPECIFIED ATRIAL FIBRILLATION: ICD-10-CM

## 2024-01-26 DIAGNOSIS — Z29.9 ENCOUNTER FOR PROPHYLACTIC MEASURES, UNSPECIFIED: ICD-10-CM

## 2024-01-26 LAB
A1C WITH ESTIMATED AVERAGE GLUCOSE RESULT: 5.3 % — SIGNIFICANT CHANGE UP (ref 4–5.6)
ANION GAP SERPL CALC-SCNC: 13 MMOL/L — SIGNIFICANT CHANGE UP (ref 5–17)
APTT BLD: 34.1 SEC — SIGNIFICANT CHANGE UP (ref 24.5–35.6)
BASOPHILS # BLD AUTO: 0.04 K/UL — SIGNIFICANT CHANGE UP (ref 0–0.2)
BASOPHILS NFR BLD AUTO: 0.4 % — SIGNIFICANT CHANGE UP (ref 0–2)
BLD GP AB SCN SERPL QL: SIGNIFICANT CHANGE UP
BUN SERPL-MCNC: 15.9 MG/DL — SIGNIFICANT CHANGE UP (ref 8–20)
CALCIUM SERPL-MCNC: 9.6 MG/DL — SIGNIFICANT CHANGE UP (ref 8.4–10.5)
CHLORIDE SERPL-SCNC: 102 MMOL/L — SIGNIFICANT CHANGE UP (ref 96–108)
CO2 SERPL-SCNC: 24 MMOL/L — SIGNIFICANT CHANGE UP (ref 22–29)
CREAT SERPL-MCNC: 0.68 MG/DL — SIGNIFICANT CHANGE UP (ref 0.5–1.3)
EGFR: 97 ML/MIN/1.73M2 — SIGNIFICANT CHANGE UP
EOSINOPHIL # BLD AUTO: 0.16 K/UL — SIGNIFICANT CHANGE UP (ref 0–0.5)
EOSINOPHIL NFR BLD AUTO: 1.6 % — SIGNIFICANT CHANGE UP (ref 0–6)
ESTIMATED AVERAGE GLUCOSE: 105 MG/DL — SIGNIFICANT CHANGE UP (ref 68–114)
GLUCOSE SERPL-MCNC: 96 MG/DL — SIGNIFICANT CHANGE UP (ref 70–99)
HCT VFR BLD CALC: 40.5 % — SIGNIFICANT CHANGE UP (ref 34.5–45)
HGB BLD-MCNC: 13.4 G/DL — SIGNIFICANT CHANGE UP (ref 11.5–15.5)
IMM GRANULOCYTES NFR BLD AUTO: 0.3 % — SIGNIFICANT CHANGE UP (ref 0–0.9)
INR BLD: 1.39 RATIO — HIGH (ref 0.85–1.18)
LYMPHOCYTES # BLD AUTO: 2.36 K/UL — SIGNIFICANT CHANGE UP (ref 1–3.3)
LYMPHOCYTES # BLD AUTO: 24.1 % — SIGNIFICANT CHANGE UP (ref 13–44)
MCHC RBC-ENTMCNC: 28.6 PG — SIGNIFICANT CHANGE UP (ref 27–34)
MCHC RBC-ENTMCNC: 33.1 GM/DL — SIGNIFICANT CHANGE UP (ref 32–36)
MCV RBC AUTO: 86.5 FL — SIGNIFICANT CHANGE UP (ref 80–100)
MONOCYTES # BLD AUTO: 0.63 K/UL — SIGNIFICANT CHANGE UP (ref 0–0.9)
MONOCYTES NFR BLD AUTO: 6.4 % — SIGNIFICANT CHANGE UP (ref 2–14)
MRSA PCR RESULT.: SIGNIFICANT CHANGE UP
NEUTROPHILS # BLD AUTO: 6.59 K/UL — SIGNIFICANT CHANGE UP (ref 1.8–7.4)
NEUTROPHILS NFR BLD AUTO: 67.2 % — SIGNIFICANT CHANGE UP (ref 43–77)
PLATELET # BLD AUTO: 317 K/UL — SIGNIFICANT CHANGE UP (ref 150–400)
POTASSIUM SERPL-MCNC: 4.2 MMOL/L — SIGNIFICANT CHANGE UP (ref 3.5–5.3)
POTASSIUM SERPL-SCNC: 4.2 MMOL/L — SIGNIFICANT CHANGE UP (ref 3.5–5.3)
PROTHROM AB SERPL-ACNC: 15.3 SEC — HIGH (ref 9.5–13)
RBC # BLD: 4.68 M/UL — SIGNIFICANT CHANGE UP (ref 3.8–5.2)
RBC # FLD: 12.8 % — SIGNIFICANT CHANGE UP (ref 10.3–14.5)
S AUREUS DNA NOSE QL NAA+PROBE: SIGNIFICANT CHANGE UP
SODIUM SERPL-SCNC: 139 MMOL/L — SIGNIFICANT CHANGE UP (ref 135–145)
WBC # BLD: 9.81 K/UL — SIGNIFICANT CHANGE UP (ref 3.8–10.5)
WBC # FLD AUTO: 9.81 K/UL — SIGNIFICANT CHANGE UP (ref 3.8–10.5)

## 2024-01-26 PROCEDURE — G0463: CPT

## 2024-01-26 RX ORDER — SODIUM CHLORIDE 9 MG/ML
3 INJECTION INTRAMUSCULAR; INTRAVENOUS; SUBCUTANEOUS EVERY 8 HOURS
Refills: 0 | Status: DISCONTINUED | OUTPATIENT
Start: 2024-02-08 | End: 2024-02-09

## 2024-01-26 NOTE — H&P PST ADULT - HISTORY OF PRESENT ILLNESS
63 year old female presents to PST with a PMH of HTN, Afib, CVA, Cerebral Aneurysm. Patient works as CNA at [employer]. Pt reports on 7/1/23 felt light headed went to ED where code stroke was called, patient found to have right posterior cerebral artery occlusion s/p IV thrombolysis with tenecteplase and successful TICI3 revascularization with mechanical thrombectomy.  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  Patient presents to PST today she is s/p cardioversion at [facility] 12/13/23.  Patient presents she is overall feeling well, she reports some fatigue but she denies chest pain, palpitations, dizziness, near syncope, syncope, weakness, confusion, difficulty speaking, gait instability, or any stroke like symptoms. Patient is scheduled for Cerebral angiogram and embolization on 2/8/24 with Dr Zhou.    Ms. Odell, a 63-year-old woman with pmHx of HTN, hospitalized at [facility] 7/1/2023 as a transfer from [facility] found to have a right posterior cerebral artery occlusion status post intravenous thrombolysis with tenecteplase and successful TICI3 revascularization with mechanical thrombectomy, newly diagnosed atrial fibrillation now anticoagulated with Eliquis, and an incidental, unruptured 4 mm anterior communicating artery aneurysm, presents today for follow up.     She underwent diagnostic cerebral angiogram on 1/11/2024 which showed a 5.8 mm tall x 5.4 mm wide x 2.8 mm neck anteriorly and medially projecting multilobulated (5 small lobules) saccular aneurysm arising from anterior communicating artery encompassing the right A2 segment within its neck. She is doing well post procedure. Groin site healed well. No bruising or pain to the groin site. She continues to take Eliquis as directed and is tolerating well. She denies any s/s of bleeding. Denies recurrent or new TIA/Stroke symptoms.     Active Problems   Cerebral aneurysm (437.3) (I67.1)  Cerebral infarction due to embolism of right posterior cerebral artery (434.11) (I63.431)  Stroke (434.91) (I63.9)   Acute depression (296.20) (F32.A)  Acute low back pain without sciatica (724.2) (M54.50)  Adult-onset obesity (278.00) (E66.9)  Anxiety disorder (300.00) (F41.9)  Asymptomatic microscopic hematuria (599.72) (R31.21)  Atopic dermatitis, unspecified type (691.8) (L20.9)  Breast screening (V76.10) (Z12.39)  Bulge of cervical disc without myelopathy (722.4) (M50.30)  Cervical strain, initial encounter (847.0) (S16.1XXA)  Embolic stroke (434.11) (I63.9)  Encounter for gynecological examination without abnormal finding (V72.31) (Z01.419)  Encounter for immunization (V03.89) (Z23)  Essential (primary) hypertension (401.9) (I10)  Fatigue, unspecified type (780.79) (R53.83)  Increased urinary frequency (788.41) (R35.0)  New onset atrial fibrillation (427.31) (I48.91)  Obesity (BMI 30-39.9) (278.00) (E66.9)  Osteoarthritis of left knee (715.96) (M17.12)  Persistent atrial fibrillation (427.31) (I48.19)  PTSD (post-traumatic stress disorder) (309.81) (F43.10)  Screening for deficiency anemia (V78.1) (Z13.0)  Screening for diabetes mellitus (V77.1) (Z13.1)  Screening for hyperlipidemia (V77.91) (Z13.220)  Screening for hypothyroidism (V77.0) (Z13.29)  Sinus congestion (478.19) (R09.81)  Sleep apnea (780.57) (G47.30)  Unable to lose weight (783.9) (R63.8)  Vitreous floaters of left eye (379.24) (H43.392)           Past Medical History   History of Acute upper respiratory infection (465.9) (J06.9)  History of Carpal tunnel syndrome of left wrist (354.0) (G56.02)  History of Colon cancer screening (V76.51) (Z12.11)  History of Elevated liver function tests (790.6) (R79.89)  History of Eyelid abnormality (374.9) (H02.9)  History of dysuria (V13.00) (Z87.898)  History of influenza vaccination (V49.89) (Z92.29)  History of mammogram (V15.89) (Z92.89)  History of Muscle strain of chest wall, initial encounter (848.8) (S29.011A)  History of Need for Tdap vaccination (V06.1) (Z23)  History of Other conjunctivitis of both eyes (372.39) (H10.89)  History of Preop examination (V72.84) (Z01.818)           Current Meds  Atorvastatin Calcium 20 MG Oral Tablet; TAKE 1 TABLET DAILY  dilTIAZem HCl  MG Oral Tablet Extended Release 24 Hour; Take 1 tablet daily  Eliquis 5 MG Oral Tablet; Take 1 tablet twice daily  Metoprolol Tartrate 100 MG Oral Tablet; TAKE 1 TABLET EVERY 12 HOURS DAILY        63 year old female presents to PST with a PMH of HTN, Afib, s/p cardioversion at [facility] 12/13/23, CVA, Cerebral Aneurysm. 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  Patient presents she is overall feeling well, she reports some fatigue but she denies chest pain, palpitations, dizziness, near syncope, syncope, weakness, confusion, difficulty speaking, gait instability, or any stroke like symptoms. Patient is scheduled for Cerebral angiogram and embolization on 2/8/24 with Dr Zhou.    Ms. Odell, a 63-year-old woman with pmHx of HTN, hospitalized at [facility] 7/1/2023 as a transfer from [facility] found to have a right posterior cerebral artery occlusion status post intravenous thrombolysis with tenecteplase and successful TICI3 revascularization with mechanical thrombectomy, newly diagnosed atrial fibrillation now anticoagulated with Eliquis, and an incidental, unruptured 4 mm anterior communicating artery aneurysm, presents today for follow up.     She underwent diagnostic cerebral angiogram on 1/11/2024 which showed a 5.8 mm tall x 5.4 mm wide x 2.8 mm neck anteriorly and medially projecting multilobulated (5 small lobules) saccular aneurysm arising from anterior communicating artery encompassing the right A2 segment within its neck. She is doing well post procedure. Groin site healed well. No bruising or pain to the groin site. She continues to take Eliquis as directed and is tolerating well. She denies any s/s of bleeding. Denies recurrent or new TIA/Stroke symptoms.     Active Problems   Cerebral aneurysm (437.3) (I67.1)  Cerebral infarction due to embolism of right posterior cerebral artery (434.11) (I63.431)  Stroke (434.91) (I63.9)   Acute depression (296.20) (F32.A)  Acute low back pain without sciatica (724.2) (M54.50)  Adult-onset obesity (278.00) (E66.9)  Anxiety disorder (300.00) (F41.9)  Asymptomatic microscopic hematuria (599.72) (R31.21)  Atopic dermatitis, unspecified type (691.8) (L20.9)  Breast screening (V76.10) (Z12.39)  Bulge of cervical disc without myelopathy (722.4) (M50.30)  Cervical strain, initial encounter (847.0) (S16.1XXA)  Embolic stroke (434.11) (I63.9)  Encounter for gynecological examination without abnormal finding (V72.31) (Z01.419)  Encounter for immunization (V03.89) (Z23)  Essential (primary) hypertension (401.9) (I10)  Fatigue, unspecified type (780.79) (R53.83)  Increased urinary frequency (788.41) (R35.0)  New onset atrial fibrillation (427.31) (I48.91)  Obesity (BMI 30-39.9) (278.00) (E66.9)  Osteoarthritis of left knee (715.96) (M17.12)  Persistent atrial fibrillation (427.31) (I48.19)  PTSD (post-traumatic stress disorder) (309.81) (F43.10)  Screening for deficiency anemia (V78.1) (Z13.0)  Screening for diabetes mellitus (V77.1) (Z13.1)  Screening for hyperlipidemia (V77.91) (Z13.220)  Screening for hypothyroidism (V77.0) (Z13.29)  Sinus congestion (478.19) (R09.81)  Sleep apnea (780.57) (G47.30)  Unable to lose weight (783.9) (R63.8)  Vitreous floaters of left eye (379.24) (H43.392)           Past Medical History   History of Acute upper respiratory infection (465.9) (J06.9)  History of Carpal tunnel syndrome of left wrist (354.0) (G56.02)  History of Colon cancer screening (V76.51) (Z12.11)  History of Elevated liver function tests (790.6) (R79.89)  History of Eyelid abnormality (374.9) (H02.9)  History of dysuria (V13.00) (Z87.898)  History of influenza vaccination (V49.89) (Z92.29)  History of mammogram (V15.89) (Z92.89)  History of Muscle strain of chest wall, initial encounter (848.8) (S29.011A)  History of Need for Tdap vaccination (V06.1) (Z23)  History of Other conjunctivitis of both eyes (372.39) (H10.89)  History of Preop examination (V72.84) (Z01.818)           Current Meds  Atorvastatin Calcium 20 MG Oral Tablet; TAKE 1 TABLET DAILY  dilTIAZem HCl  MG Oral Tablet Extended Release 24 Hour; Take 1 tablet daily  Eliquis 5 MG Oral Tablet; Take 1 tablet twice daily  Metoprolol Tartrate 100 MG Oral Tablet; TAKE 1 TABLET EVERY 12 HOURS DAILY        63 year old female presents to PST with a PMH of HTN, Afib, s/p cardioversion at [facility] 12/13/23, CVA, Cerebral Aneurysm. 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  Patient was diagnosed with AFIB started on Eliquis and metoprolol. As per 1/19/24 neuro note patient  "underwent diagnostic cerebral angiogram on 1/11/2024 which showed a 5.8 mm tall x 5.4 mm wide x 2.8 mm neck anteriorly and medially projecting multilobulated (5 small lobules) saccular aneurysm arising from anterior communicating artery encompassing the right A2 segment within its neck." Patient presents today stating she is overall feeling well, she reports some fatigue but she denies chest pain, palpitations, dizziness, near syncope, syncope, weakness, confusion, difficulty speaking, gait instability, or any stroke like symptoms. Patient is scheduled for Cerebral angiogram and embolization on 2/8/24 with Dr Zhou.

## 2024-01-26 NOTE — H&P PST ADULT - CARDIOVASCULAR
details… normal/S1 S2 present/no gallops/no rub/no murmur/bradycardia normal/S1 S2 present/no gallops/no rub/no murmur/no JVD/bradycardia/peripheral edema

## 2024-01-26 NOTE — H&P PST ADULT - NEGATIVE PSYCHIATRIC SYMPTOMS
no suicidal ideation/no depression no suicidal ideation/no depression/no insomnia/no memory loss/no paranoia/no mood swings/no agitation/no visual hallucinations/no auditory hallucinations/no hyperactivity

## 2024-01-26 NOTE — H&P PST ADULT - GASTROINTESTINAL
normal/soft/nontender/nondistended/normal active bowel sounds negative details… normal/soft/nontender/nondistended/normal active bowel sounds/no guarding/no rigidity/no organomegaly/no palpable jessica/no masses palpable

## 2024-01-26 NOTE — H&P PST ADULT - NEGATIVE NEUROLOGICAL SYMPTOMS
no transient paralysis/no weakness/no paresthesias/no generalized seizures/no focal seizures/no syncope/no tremors/no vertigo/no difficulty walking/no headache/no loss of consciousness/no hemiparesis/no confusion/no facial palsy

## 2024-01-26 NOTE — H&P PST ADULT - CARDIOVASCULAR COMMENTS
peripheral edema, right shin trace, left shin +1 pitting AFIB s/p cardioversion  12/13/23 peripheral edema, +1 edema b/l

## 2024-01-26 NOTE — H&P PST ADULT - PROBLEM SELECTOR PLAN 1
Caprini Score 7 High risk,  Surgical team should assess /Strongly recommend pharmacological and mechanical measures for VTE prophylaxis Caprini Score 7 Pt High risk,  Surgical team should assess /Strongly recommend pharmacological and mechanical measures for VTE prophylaxis Patient is scheduled for Cerebral angiogram and embolization on 2/8/24 with Dr Zhou.  Pt to continue Eliquis as prescribed and to start Plavix one week prior to surgery as per surgery team recommendations. Pt understands need to continue these medications as prescribed up to dos.

## 2024-01-26 NOTE — H&P PST ADULT - NSICDXPASTSURGICALHX_GEN_ALL_CORE_FT
PAST SURGICAL HISTORY:  H/O carpal tunnel repair     History of cardioversion      PAST SURGICAL HISTORY:  H/O carpal tunnel repair     H/O coronary angiogram     History of cardioversion

## 2024-01-26 NOTE — H&P PST ADULT - NEGATIVE GENERAL GENITOURINARY SYMPTOMS
no flank pain L/no flank pain R/no bladder infections/no incontinence/no dysuria no hematuria/no renal colic/no flank pain L/no flank pain R/no urine discoloration/no bladder infections/no incontinence/no dysuria

## 2024-01-26 NOTE — H&P PST ADULT - PROBLEM SELECTOR PLAN 4
Caprini Score 7 Pt High risk,  Surgical team should assess /Strongly recommend pharmacological and mechanical measures for VTE prophylaxis

## 2024-01-26 NOTE — H&P PST ADULT - ASSESSMENT
CAPRINI SCORE    AGE RELATED RISK FACTORS                                                             [ ] Age 41-60 years                                            (1 Point)  [x ] Age: 61-74 years                                           (2 Points)                 [ ] Age= 75 years                                                (3 Points)             DISEASE RELATED RISK FACTORS                                                       [x ] Edema in the lower extremities                 (1 Point)                     [ ] Varicose veins                                               (1 Point)                                 [x ] BMI > 25 Kg/m2                                            (1 Point)                                  [ ] Serious infection (ie PNA)                            (1 Point)                     [ ] Lung disease ( COPD, Emphysema)            (1 Point)                                                                          [ ] Acute myocardial infarction                         (1 Point)                  [ ] Congestive heart failure (in the previous month)  (1 Point)         [ ] Inflammatory bowel disease                            (1 Point)                  [ ] Central venous access, PICC or Port               (2 points)       (within the last month)                                                                [ ] Stroke (in the previous month)                        (5 Points)    [ ] Previous or present malignancy                       (2 points)                                                                                                                                                         HEMATOLOGY RELATED FACTORS                                                         [ ] Prior episodes of VTE                                     (3 Points)                     [ ] Positive family history for VTE                      (3 Points)                  [ ] Prothrombin 12195 A                                     (3 Points)                     [ ] Factor V Leiden                                                (3 Points)                        [ ] Lupus anticoagulants                                      (3 Points)                                                           [ ] Anticardiolipin antibodies                              (3 Points)                                                       [ ] High homocysteine in the blood                   (3 Points)                                             [ ] Other congenital or acquired thrombophilia      (3 Points)                                                [ ] Heparin induced thrombocytopenia                  (3 Points)                                        MOBILITY RELATED FACTORS  [ ] Bed rest                                                         (1 Point)  [ ] Plaster cast                                                    (2 points)  [ ] Bed bound for more than 72 hours           (2 Points)    GENDER SPECIFIC FACTORS  [ ] Pregnancy or had a baby within the last month   (1 Point)  [ ] Post-partum < 6 weeks                                   (1 Point)  [ ] Hormonal therapy  or oral contraception   (1 Point)  [ ] History of pregnancy complications              (1 point)  [ ] Unexplained or recurrent              (1 Point)    OTHER RISK FACTORS                                           (1 Point)  [ x] BMI >40, smoking, diabetes requiring insulin, chemotherapy  blood transfusions and length of surgery over 2 hours    SURGERY RELATED RISK FACTORS  [ ]  Section within the last month     (1 Point)  [ ] Minor surgery                                                  (1 Point)  [ ] Arthroscopic surgery                                       (2 Points)  [x ] Planned major surgery lasting more            (2 Points)      than 45 minutes     [ ] Elective hip or knee joint replacement       (5 points)       surgery                                                TRAUMA RELATED RISK FACTORS  [ ] Fracture of the hip, pelvis, or leg                       (5 Points)  [ ] Spinal cord injury resulting in paralysis             (5 points)       (in the previous month)    [ ] Paralysis  (less than 1 month)                             (5 Points)  [ ] Multiple Trauma within 1 month                        (5 Points)    Total Score [    7   ]    Caprini Score 0-2: Low Risk, NO VTE prophylaxis required for most patients, encourage ambulation  Caprini Score 3-6: Moderate Risk , pharmacologic VTE prophylaxis is indicated for most patients (in the absence of contraindications)  Caprini Score Greater than or =7: High risk, pharmocologic VTE prophylaxis indicated for most patients (in the absence of contraindications)      OPIOID RISK TOOL    ZACHARY EACH BOX THAT APPLIES AND ADD TOTALS AT THE END    FAMILY HISTORY OF SUBSTANCE ABUSE                 FEMALE         MALE                                                Alcohol                             [  ]1 pt          [  ]3pts                                               Illegal Durgs                     [  ]2 pts        [  ]3pts                                               Rx Drugs                           [  ]4 pts        [  ]4 pts    PERSONAL HISTORY OF SUBSTANCE ABUSE                                                                                          Alcohol                             [  ]3 pts       [  ]3 pts                                               Illegal Drugs                     [  ]4 pts        [  ]4 pts                                               Rx Drugs                           [  ]5 pts        [  ]5 pts    AGE BETWEEN 16-45 YEARS                                      [  ]1 pt         [  ]1 pt    HISTORY OF PREADOLESCENT   SEXUAL ABUSE                                                             [  ]3 pts        [  ]0pts    PSYCHOLOGICAL DISEASE                     ADD, OCD, Bipolar, Schizophrenia        [  ]2 pts         [  ]2 pts                      Depression                                               [  ]1 pt           [  ]1 pt           SCORING TOTAL  0                                A score of 3 or lower indicated LOW risk for future opiod abuse  A score of 4 to 7 indicated moderate risk for future opiod abuse  A score of 8 or higher indicates a high risk for opiod abuse    63 year old female pmhx of HTN, works as CNA at [employer] 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  Patient presents to PST today she is s/p cardioversion at [facility] 23.  Patient presents she is overall feeling well, she reports some fatigue but she denies chest pain, palpitations, dizziness, near syncope, syncope, weakness, confusion, difficulty speaking, gait instability, or any stroke like symptoms. Patient is scheduled for cerebral aneurysm on 24 with Dr Zhou.  Patient educated on surgical scrub, NPO after MN, preadmission instructions, and day of procedure medications, verbalizes understanding.  Pt instructed to stop vitamins/supplements/herbal medications/ASA/NSAIDS for one week prior to surgery and discuss with PMD.      Patient educated on surgical scrub, preadmission instructions, medical clearance and day of procedure medications, pt. verbalizes understanding and agreement. Pt. educated and instructed regarding all preoperative instructions and education as per policy via both verbal and written means of communication and pt. verbalized agreement and understanding.   CAPRINI SCORE    AGE RELATED RISK FACTORS                                                             [ ] Age 41-60 years                                            (1 Point)  [x ] Age: 61-74 years                                           (2 Points)                 [ ] Age= 75 years                                                (3 Points)             DISEASE RELATED RISK FACTORS                                                       [x ] Edema in the lower extremities                 (1 Point)                     [ ] Varicose veins                                               (1 Point)                                 [x ] BMI > 25 Kg/m2                                            (1 Point)                                  [ ] Serious infection (ie PNA)                            (1 Point)                     [ ] Lung disease ( COPD, Emphysema)            (1 Point)                                                                          [ ] Acute myocardial infarction                         (1 Point)                  [ ] Congestive heart failure (in the previous month)  (1 Point)         [ ] Inflammatory bowel disease                            (1 Point)                  [ ] Central venous access, PICC or Port               (2 points)       (within the last month)                                                                [ ] Stroke (in the previous month)                        (5 Points)    [ ] Previous or present malignancy                       (2 points)                                                                                                                                                         HEMATOLOGY RELATED FACTORS                                                         [ ] Prior episodes of VTE                                     (3 Points)                     [ ] Positive family history for VTE                      (3 Points)                  [ ] Prothrombin 02351 A                                     (3 Points)                     [ ] Factor V Leiden                                                (3 Points)                        [ ] Lupus anticoagulants                                      (3 Points)                                                           [ ] Anticardiolipin antibodies                              (3 Points)                                                       [ ] High homocysteine in the blood                   (3 Points)                                             [ ] Other congenital or acquired thrombophilia      (3 Points)                                                [ ] Heparin induced thrombocytopenia                  (3 Points)                                        MOBILITY RELATED FACTORS  [ ] Bed rest                                                         (1 Point)  [ ] Plaster cast                                                    (2 points)  [ ] Bed bound for more than 72 hours           (2 Points)    GENDER SPECIFIC FACTORS  [ ] Pregnancy or had a baby within the last month   (1 Point)  [ ] Post-partum < 6 weeks                                   (1 Point)  [ ] Hormonal therapy  or oral contraception   (1 Point)  [ ] History of pregnancy complications              (1 point)  [ ] Unexplained or recurrent              (1 Point)    OTHER RISK FACTORS                                           (1 Point)  [ x] BMI >40, smoking, diabetes requiring insulin, chemotherapy  blood transfusions and length of surgery over 2 hours    SURGERY RELATED RISK FACTORS  [ ]  Section within the last month     (1 Point)  [ ] Minor surgery                                                  (1 Point)  [ ] Arthroscopic surgery                                       (2 Points)  [x ] Planned major surgery lasting more            (2 Points)      than 45 minutes     [ ] Elective hip or knee joint replacement       (5 points)       surgery                                                TRAUMA RELATED RISK FACTORS  [ ] Fracture of the hip, pelvis, or leg                       (5 Points)  [ ] Spinal cord injury resulting in paralysis             (5 points)       (in the previous month)    [ ] Paralysis  (less than 1 month)                             (5 Points)  [ ] Multiple Trauma within 1 month                        (5 Points)    Total Score [    7   ]    Caprini Score 0-2: Low Risk, NO VTE prophylaxis required for most patients, encourage ambulation  Caprini Score 3-6: Moderate Risk , pharmacologic VTE prophylaxis is indicated for most patients (in the absence of contraindications)  Caprini Score Greater than or =7: High risk, pharmocologic VTE prophylaxis indicated for most patients (in the absence of contraindications)      OPIOID RISK TOOL    ZACHARY EACH BOX THAT APPLIES AND ADD TOTALS AT THE END    FAMILY HISTORY OF SUBSTANCE ABUSE                 FEMALE         MALE                                                Alcohol                             [  ]1 pt          [  ]3pts                                               Illegal Durgs                     [  ]2 pts        [  ]3pts                                               Rx Drugs                           [  ]4 pts        [  ]4 pts    PERSONAL HISTORY OF SUBSTANCE ABUSE                                                                                          Alcohol                             [  ]3 pts       [  ]3 pts                                               Illegal Drugs                     [  ]4 pts        [  ]4 pts                                               Rx Drugs                           [  ]5 pts        [  ]5 pts    AGE BETWEEN 16-45 YEARS                                      [  ]1 pt         [  ]1 pt    HISTORY OF PREADOLESCENT   SEXUAL ABUSE                                                             [  ]3 pts        [  ]0pts    PSYCHOLOGICAL DISEASE                     ADD, OCD, Bipolar, Schizophrenia        [  ]2 pts         [  ]2 pts                      Depression                                               [  ]1 pt           [  ]1 pt           SCORING TOTAL  0                                A score of 3 or lower indicated LOW risk for future opiod abuse  A score of 4 to 7 indicated moderate risk for future opiod abuse  A score of 8 or higher indicates a high risk for opiod abuse    63 year old female pmhx of HTN, 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  Patient presents to PST today she is s/p cardioversion at [facility] 23.  Patient presents she is overall feeling well, she reports some fatigue but she denies chest pain, palpitations, dizziness, near syncope, syncope, weakness, confusion, difficulty speaking, gait instability, or any stroke like symptoms. Patient is scheduled for cerebral aneurysm on 24 with Dr Zhou.  Patient educated on surgical scrub, NPO after MN, preadmission instructions, and day of procedure medications, verbalizes understanding.  Pt instructed to stop vitamins/supplements/herbal medications/ASA/NSAIDS for one week prior to surgery and discuss with PMD.      63 year old female presents to PST with a PMH of HTN, Afib, s/p cardioversion at [facility] 23, CVA, Cerebral Aneurysm. Patient works as CNA at [employer]. Pt reports 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  Patient was diagnosed with AFIB started on Eliquis and metoprolol. As per 24 neuro note patient  "underwent diagnostic cerebral angiogram on 2024 which showed a 5.8 mm tall x 5.4 mm wide x 2.8 mm neck anteriorly and medially projecting multilobulated (5 small lobules) saccular aneurysm arising from anterior communicating artery encompassing the right A2 segment within its neck." Patient presents today stating she is overall feeling well, she reports some fatigue but she denies chest pain, palpitations, dizziness, near syncope, syncope, weakness, confusion, difficulty speaking, gait instability, or any stroke like symptoms. Patient is scheduled for Cerebral angiogram and embolization on 24 with Dr Zhou. Patient educated on surgical scrub, preadmission instructions, medical clearance and day of procedure medications, pt. verbalizes understanding and agreement. Pt. educated and instructed regarding all preoperative instructions and education as per policy via both verbal and written means of communication and pt. verbalized agreement and understanding. Patient educated on surgical scrub, NPO after MN, preadmission instructions, and day of procedure medications, verbalizes understanding. Pt instructed to stop vitamins/supplements/herbal medications/NSAIDS for one week prior to surgery and discuss with PMD.      CAPRINI SCORE    AGE RELATED RISK FACTORS                                                             [ ] Age 41-60 years                                            (1 Point)  [x ] Age: 61-74 years                                           (2 Points)                 [ ] Age= 75 years                                                (3 Points)             DISEASE RELATED RISK FACTORS                                                       [x ] Edema in the lower extremities                 (1 Point)                     [ ] Varicose veins                                               (1 Point)                                 [x ] BMI > 25 Kg/m2                                            (1 Point)                                  [ ] Serious infection (ie PNA)                            (1 Point)                     [ ] Lung disease ( COPD, Emphysema)            (1 Point)                                                                          [ ] Acute myocardial infarction                         (1 Point)                  [ ] Congestive heart failure (in the previous month)  (1 Point)         [ ] Inflammatory bowel disease                            (1 Point)                  [ ] Central venous access, PICC or Port               (2 points)       (within the last month)                                                                [ ] Stroke (in the previous month)                        (5 Points)    [ ] Previous or present malignancy                       (2 points)                                                                                                                                                         HEMATOLOGY RELATED FACTORS                                                         [ ] Prior episodes of VTE                                     (3 Points)                     [ ] Positive family history for VTE                      (3 Points)                  [ ] Prothrombin 90769 A                                     (3 Points)                     [ ] Factor V Leiden                                                (3 Points)                        [ ] Lupus anticoagulants                                      (3 Points)                                                           [ ] Anticardiolipin antibodies                              (3 Points)                                                       [ ] High homocysteine in the blood                   (3 Points)                                             [ ] Other congenital or acquired thrombophilia      (3 Points)                                                [ ] Heparin induced thrombocytopenia                  (3 Points)                                        MOBILITY RELATED FACTORS  [ ] Bed rest                                                         (1 Point)  [ ] Plaster cast                                                    (2 points)  [ ] Bed bound for more than 72 hours           (2 Points)    GENDER SPECIFIC FACTORS  [ ] Pregnancy or had a baby within the last month   (1 Point)  [ ] Post-partum < 6 weeks                                   (1 Point)  [ ] Hormonal therapy  or oral contraception   (1 Point)  [ ] History of pregnancy complications              (1 point)  [ ] Unexplained or recurrent              (1 Point)    OTHER RISK FACTORS                                           (1 Point)  [ x] BMI >40, smoking, diabetes requiring insulin, chemotherapy  blood transfusions and length of surgery over 2 hours    SURGERY RELATED RISK FACTORS  [ ]  Section within the last month     (1 Point)  [ ] Minor surgery                                                  (1 Point)  [ ] Arthroscopic surgery                                       (2 Points)  [x ] Planned major surgery lasting more            (2 Points)      than 45 minutes     [ ] Elective hip or knee joint replacement       (5 points)       surgery                                                TRAUMA RELATED RISK FACTORS  [ ] Fracture of the hip, pelvis, or leg                       (5 Points)  [ ] Spinal cord injury resulting in paralysis             (5 points)       (in the previous month)    [ ] Paralysis  (less than 1 month)                             (5 Points)  [ ] Multiple Trauma within 1 month                        (5 Points)    Total Score [    7   ]    Caprini Score 0-2: Low Risk, NO VTE prophylaxis required for most patients, encourage ambulation  Caprini Score 3-6: Moderate Risk , pharmacologic VTE prophylaxis is indicated for most patients (in the absence of contraindications)  Caprini Score Greater than or =7: High risk, pharmocologic VTE prophylaxis indicated for most patients (in the absence of contraindications)      OPIOID RISK TOOL    ZACHARY EACH BOX THAT APPLIES AND ADD TOTALS AT THE END    FAMILY HISTORY OF SUBSTANCE ABUSE                 FEMALE         MALE                                                Alcohol                             [  ]1 pt          [  ]3pts                                               Illegal Durgs                     [  ]2 pts        [  ]3pts                                               Rx Drugs                           [  ]4 pts        [  ]4 pts    PERSONAL HISTORY OF SUBSTANCE ABUSE                                                                                          Alcohol                             [  ]3 pts       [  ]3 pts                                               Illegal Drugs                     [  ]4 pts        [  ]4 pts                                               Rx Drugs                           [  ]5 pts        [  ]5 pts    AGE BETWEEN 16-45 YEARS                                      [  ]1 pt         [  ]1 pt    HISTORY OF PREADOLESCENT   SEXUAL ABUSE                                                             [  ]3 pts        [  ]0pts    PSYCHOLOGICAL DISEASE                     ADD, OCD, Bipolar, Schizophrenia        [  ]2 pts         [  ]2 pts                      Depression                                               [  ]1 pt           [  ]1 pt           SCORING TOTAL  0                                A score of 3 or lower indicated LOW risk for future opiod abuse  A score of 4 to 7 indicated moderate risk for future opiod abuse  A score of 8 or higher indicates a high risk for opiod abuse

## 2024-02-02 NOTE — PATIENT PROFILE ADULT - FALL HARM RISK - RISK INTERVENTIONS
-management per primary team     Assistance OOB with selected safe patient handling equipment/Assistance with ambulation/Communicate Fall Risk and Risk Factors to all staff, patient, and family/Discuss with provider need for PT consult/Monitor gait and stability/Provide patient with walking aids - walker, cane, crutches/Reinforce activity limits and safety measures with patient and family/Visual Cue: Yellow wristband/Bed in lowest position, wheels locked, appropriate side rails in place/Call bell, personal items and telephone in reach/Instruct patient to call for assistance before getting out of bed or chair/Non-slip footwear when patient is out of bed/[place] to call system/Physically safe environment - no spills, clutter or unnecessary equipment/Purposeful Proactive Rounding/Room/bathroom lighting operational, light cord in reach

## 2024-02-08 ENCOUNTER — APPOINTMENT (OUTPATIENT)
Dept: NEUROLOGY | Facility: HOSPITAL | Age: 64
End: 2024-02-08

## 2024-02-08 ENCOUNTER — INPATIENT (INPATIENT)
Facility: HOSPITAL | Age: 64
LOS: 0 days | Discharge: HOME | DRG: 27 | End: 2024-02-09
Attending: PSYCHIATRY & NEUROLOGY | Admitting: PSYCHIATRY & NEUROLOGY
Payer: COMMERCIAL

## 2024-02-08 VITALS
HEIGHT: 66 IN | DIASTOLIC BLOOD PRESSURE: 70 MMHG | TEMPERATURE: 97 F | OXYGEN SATURATION: 98 % | RESPIRATION RATE: 17 BRPM | WEIGHT: 223.11 LBS | HEART RATE: 52 BPM | SYSTOLIC BLOOD PRESSURE: 144 MMHG

## 2024-02-08 DIAGNOSIS — I67.1 CEREBRAL ANEURYSM, NONRUPTURED: ICD-10-CM

## 2024-02-08 DIAGNOSIS — Z98.890 OTHER SPECIFIED POSTPROCEDURAL STATES: Chronic | ICD-10-CM

## 2024-02-08 DIAGNOSIS — Z92.89 PERSONAL HISTORY OF OTHER MEDICAL TREATMENT: Chronic | ICD-10-CM

## 2024-02-08 LAB — PA ADP PRP-ACNC: 45 PRU — LOW (ref 180–376)

## 2024-02-08 PROCEDURE — 36224 PLACE CATH CAROTD ART: CPT | Mod: 50

## 2024-02-08 PROCEDURE — 75898 FOLLOW-UP ANGIOGRAPHY: CPT | Mod: 26

## 2024-02-08 PROCEDURE — 99252 IP/OBS CONSLTJ NEW/EST SF 35: CPT

## 2024-02-08 PROCEDURE — 75894 X-RAYS TRANSCATH THERAPY: CPT | Mod: 26

## 2024-02-08 PROCEDURE — 61624 TCAT PERM OCCLS/EMBOLJ CNS: CPT

## 2024-02-08 PROCEDURE — 76377 3D RENDER W/INTRP POSTPROCES: CPT | Mod: 26

## 2024-02-08 RX ORDER — ONDANSETRON 8 MG/1
4 TABLET, FILM COATED ORAL EVERY 6 HOURS
Refills: 0 | Status: DISCONTINUED | OUTPATIENT
Start: 2024-02-08 | End: 2024-02-09

## 2024-02-08 RX ORDER — ACETAMINOPHEN 500 MG
650 TABLET ORAL EVERY 6 HOURS
Refills: 0 | Status: DISCONTINUED | OUTPATIENT
Start: 2024-02-08 | End: 2024-02-09

## 2024-02-08 RX ORDER — CLOPIDOGREL BISULFATE 75 MG/1
75 TABLET, FILM COATED ORAL
Refills: 0 | Status: DISCONTINUED | OUTPATIENT
Start: 2024-02-09 | End: 2024-02-09

## 2024-02-08 RX ORDER — APIXABAN 2.5 MG/1
5 TABLET, FILM COATED ORAL EVERY 12 HOURS
Refills: 0 | Status: DISCONTINUED | OUTPATIENT
Start: 2024-02-08 | End: 2024-02-09

## 2024-02-08 RX ORDER — POLYETHYLENE GLYCOL 3350 17 G/17G
17 POWDER, FOR SOLUTION ORAL DAILY
Refills: 0 | Status: DISCONTINUED | OUTPATIENT
Start: 2024-02-08 | End: 2024-02-09

## 2024-02-08 RX ORDER — DILTIAZEM HCL 120 MG
240 CAPSULE, EXT RELEASE 24 HR ORAL DAILY
Refills: 0 | Status: DISCONTINUED | OUTPATIENT
Start: 2024-02-08 | End: 2024-02-09

## 2024-02-08 RX ORDER — ATORVASTATIN CALCIUM 80 MG/1
40 TABLET, FILM COATED ORAL AT BEDTIME
Refills: 0 | Status: DISCONTINUED | OUTPATIENT
Start: 2024-02-08 | End: 2024-02-09

## 2024-02-08 RX ORDER — SODIUM CHLORIDE 9 MG/ML
1000 INJECTION INTRAMUSCULAR; INTRAVENOUS; SUBCUTANEOUS
Refills: 0 | Status: DISCONTINUED | OUTPATIENT
Start: 2024-02-08 | End: 2024-02-09

## 2024-02-08 RX ORDER — METOPROLOL TARTRATE 50 MG
100 TABLET ORAL
Refills: 0 | Status: DISCONTINUED | OUTPATIENT
Start: 2024-02-08 | End: 2024-02-08

## 2024-02-08 RX ORDER — SENNA PLUS 8.6 MG/1
2 TABLET ORAL AT BEDTIME
Refills: 0 | Status: DISCONTINUED | OUTPATIENT
Start: 2024-02-08 | End: 2024-02-09

## 2024-02-08 RX ORDER — METOPROLOL TARTRATE 50 MG
100 TABLET ORAL
Refills: 0 | Status: DISCONTINUED | OUTPATIENT
Start: 2024-02-08 | End: 2024-02-09

## 2024-02-08 RX ADMIN — POLYETHYLENE GLYCOL 3350 17 GRAM(S): 17 POWDER, FOR SOLUTION ORAL at 13:53

## 2024-02-08 RX ADMIN — SENNA PLUS 2 TABLET(S): 8.6 TABLET ORAL at 21:26

## 2024-02-08 RX ADMIN — APIXABAN 5 MILLIGRAM(S): 2.5 TABLET, FILM COATED ORAL at 18:40

## 2024-02-08 RX ADMIN — ONDANSETRON 4 MILLIGRAM(S): 8 TABLET, FILM COATED ORAL at 13:53

## 2024-02-08 RX ADMIN — SODIUM CHLORIDE 75 MILLILITER(S): 9 INJECTION INTRAMUSCULAR; INTRAVENOUS; SUBCUTANEOUS at 13:53

## 2024-02-08 RX ADMIN — Medication 100 MILLIGRAM(S): at 18:40

## 2024-02-08 RX ADMIN — SODIUM CHLORIDE 3 MILLILITER(S): 9 INJECTION INTRAMUSCULAR; INTRAVENOUS; SUBCUTANEOUS at 21:30

## 2024-02-08 RX ADMIN — SODIUM CHLORIDE 3 MILLILITER(S): 9 INJECTION INTRAMUSCULAR; INTRAVENOUS; SUBCUTANEOUS at 14:54

## 2024-02-08 RX ADMIN — SODIUM CHLORIDE 75 MILLILITER(S): 9 INJECTION INTRAMUSCULAR; INTRAVENOUS; SUBCUTANEOUS at 21:27

## 2024-02-08 RX ADMIN — ATORVASTATIN CALCIUM 40 MILLIGRAM(S): 80 TABLET, FILM COATED ORAL at 21:26

## 2024-02-08 NOTE — CHART NOTE - NSCHARTNOTEFT_GEN_A_CORE
Neurointerventional Surgery Post Procedure Note    Procedure: Selective Cerebral Angiography     Pre- Procedure Diagnosis: acomm aneurysm   Post- Procedure Diagnosis: acomm aneurysm s/p embolization with MARLENY stent and coil x1     : Dr. Zhou  Physician Assistant: Marcia Mike  Nurse: Bibiana Newman  Anesthesiologist: JAE Ge                    Radiology Tech: Tom Lopez     Sheath:  6 Setswana Sheath    I/Os: estimated blood loss less than 20cc,  IV fluids 500cc, Urine output 450cc, Contrast: Omnipaque 240  72 cc, clindamycin 900mg ancef, heparin 5000 units    Vitals:  /72  HR 54  Spo2 100 %    Preliminary Report:  Under a 6 Setswana Armadillo sheath via the right groin under general anesthesia via left internal carotid artery, right internal carotid artery, a selective cerebral angiography was performed and reveals acomm aneurysm s/p embolization with MARLENY stent and coil x1 . ( Official note to follow).    Patient tolerated procedure well.  Patient remains hemodynamically stable, no change in neurological status compared to baseline.  Results were discussed with patient, patient's family and Neurosurgery.  Right groin sheath  was discontinued using Angioseal device at 1236. Hemostasis was obtained with approximately 9 minutes of manual compression.     No active bleeding, no hematoma, no ecchymosis.   Quick clot and safeguard balloon dressing applied at 1245  Patient transferred to neuro ICU in stable condition.

## 2024-02-08 NOTE — CONSULT NOTE ADULT - ASSESSMENT
Assessment:  63F with PMH HTN, HLD, afib on Eliquis, CVA who presented for elective acomm aneurysm embolization on 2/8/24. Admitted to NSICU for postop cares.      Plan:  Neuro:  - Q1 hour Neuro checks, Q1 hour Vitals  - HOB 30 degrees, Neck midline position  - Maintain normothermia, PO acetaminophen for temp>38 C or pain  - MRI / MRA NOVA??   - Monitor groin  - Pain management & Sedation: tylenol PRN   - Turn and Position Q2  /  Activity ad jose, with assistance  	  CV:  - SBP Goal   - Rhythm control: cardizem 240, metoprolol 100 BID  - Arterial line  - Atorvastatin 40     Pulm:  - Supplemental O2 PRN to maintain Spo2>92%  - Chest PT, OOB, Pulmonary Toilet    GI:  - Nutrition: advance as tolerated   - Bowel regimen: senna, miralax   	  Gu:  - Emery   - Fluids: NS @ 75  - I&O Q1 hour  - Monitor Electrolytes & Renal Function    Heme:  - Monitor H&H  - Eliquis 5 BID for afib  - Plavix 75 daily, PRU WNL this am   - Chemical DVT prophylaxis: Eliquis   - Mechanical DVT Prophylaxis: Maintain B/L LE sequential compression devices  	  ID:  - Monitor WBC and Temperature    		  Endo  - Monitor BGL, maintain <180 Assessment:  63F with PMH HTN, HLD, afib on Eliquis, CVA who presented for elective acomm aneurysm embolization with MARLENY stent and coil on 2/8/24. Admitted to NSICU for postop cares.      Plan:  Neuro:  - Q1 hour Neuro checks, Q1 hour Vitals  - HOB 30 degrees, Neck midline position  - Maintain normothermia, PO acetaminophen for temp>38 C or pain  - No imaging required   - Monitor groin  - Pain management & Sedation: tylenol PRN   - Turn and Position Q2  /  Activity ad jose, with assistance  	  CV:  - SBP Goal   - Rhythm control: cardizem 240, metoprolol 100 BID  - Arterial line L radial   - Atorvastatin 40     Pulm:  - Supplemental O2 PRN to maintain Spo2>92%  - Chest PT, OOB, Pulmonary Toilet    GI:  - Nutrition: advance as tolerated   - Bowel regimen: senna, miralax   	  Gu:  - Emery   - Fluids: NS @ 75  - I&O Q1 hour  - Monitor Electrolytes & Renal Function    Heme:  - Monitor H&H  - Eliquis 5 BID for afib  - Plavix 75 daily, PRU WNL this am   - Chemical DVT prophylaxis: Eliquis   - Mechanical DVT Prophylaxis: Maintain B/L LE sequential compression devices  	  ID:  - Monitor WBC and Temperature    		  Endo  - Monitor BGL, maintain <180

## 2024-02-08 NOTE — ASU PATIENT PROFILE, ADULT - FALL HARM RISK - HARM RISK INTERVENTIONS

## 2024-02-08 NOTE — PATIENT PROFILE ADULT - FALL HARM RISK - FALLEN IN PAST
A/P: 70y Male planned for above procedure  NPO past midnight, except medications  IVF as per primary team, spoke with ACP Tanya walters  AM labs CBC, BMP, Mg, Phos, Lytes, Type and Screen, Coags  Consent in chart   A/C for Afib and DVT, hold heparin drip at 11am tomorrow,  spoke with ACP Tanya walters    [id number] 70y Male planned for fat pad biopsy    NPO past midnight, except medications  IVF as per primary team, spoke with ACP Tanya [id number]  AM labs CBC, BMP, Mg, Phos, Lytes, Type and Screen, Coags  A/C for Afib and DVT, hold heparin drip at 11am tomorrow,  spoke with ACP Tanya [id number]  Pt not consented, sister is refusing to sign consent at this time.    [id number] No

## 2024-02-08 NOTE — PATIENT PROFILE ADULT - FALL HARM RISK - RISK INTERVENTIONS
Bed in lowest position, wheels locked, appropriate side rails in place/Instruct patient to call for assistance before getting out of bed or chair/[place] to call system/Unable to comprehend

## 2024-02-08 NOTE — CHART NOTE - NSCHARTNOTEFT_GEN_A_CORE
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 Patient underwent diagnostic cerebral angiogram 1/2024 which confirmed 5mm acomm aneurysm. Patient presents today for cerebral angiogram with embolization, was started on Plavix for planned stenting. Patient reports no complaints, denies HA, weakness, numbness, tingling, CP, SOB, N/V.       Allergies: azithromycin (Hives; Rash)  penicillin (Hives; Rash)      PAST MEDICAL & SURGICAL HISTORY:  HTN (hypertension)  CVA (cerebrovascular accident)  Afib  Carpal tunnel syndrome  Cerebral aneurysm  H/O carpal tunnel repair  History of cardioversion  H/O coronary angiogram      FAMILY HISTORY:  Family history of stroke or transient ischemic attack in mother (Mother, Sibling)  FH: diabetes mellitus (Mother)      Physical Exam:  Constitutional: NAD, lying in bed  Neuro  * Mental Status:  GCS 15: Awake, alert, oriented to conversation. No aphasia or difficulty speaking. No dysarthria. Able to name objects and their function.  * Cranial Nerves: Cnii-Cnxii grossly intact. PERRL, EOMI, tongue midline, no gaze deviation  * Motor: RUE 5/5, LUE 5/5, RLE 5/5, LLE 5/5, no drift or dysmetria  * Sensory: Sensation intact to light touch  * Reflexes: not assessed   Cardiovascular: Regular rate and rhythm.  Eyes: See neurologic examination with detailed examination of eyes.  ENT: No JVD, Trachea Midline  Respiratory: non labored breathing   Gastrointestinal: Soft, nontender, nondistended.  Genitourinary: [ ] Emery, [ x ] No Emery.   Musculoskeletal: No muscle wasting noted, (See neurologic assessment for full muscle strength assessment)   Skin:  no wounds, no redness, no abrasions noted  Hematologic / Lymph / Immunologic: No bleeding from IV sites or wounds      NIH SS:  DATE: 2/8/24   TIME:  1A: Level of consciousness (0-3): 0  1B: Questions (0-2): 0    1C: Commands (0-2): 0  2: Gaze (0-2): 0  3: Visual fields (0-3): 0  4: Facial palsy (0-3): 0  MOTOR:  5A: Left arm motor drift (0-4): 0  5B: Right arm motor drift (0-4): 0  6A: Left leg motor drift (0-4): 0  6B: Right leg motor drift (0-4): 0  7: Limb ataxia (0-2): 0  SENSORY:  8: Sensation (0-2): 0  SPEECH:  9: Language (0-3): 0  10: Dysarthria (0-2): 0  EXTINCTION:  11: Extinction/inattention (0-2): 0    TOTAL SCORE:       Labs:   1/26/24: WBC 9.81, hgb 13.4, hct 40.5, platelets 317  1/26/24: PTT 34.1, INR 1.39  1/26/24: Na 139, K 4.2, Cl 102, CO2 24, BUN 15.9, Cr 0.68, platelets 96       Assessment/Plan:   This is a 63y  year old Female  presents with acomm aneurysm. Patient presents to neuro-IR for selective cerebral angiography with embolization.    Procedure, goals, risks, benefits and alternatives  were discussed with patient.  All questions were answered.  Risks include but are not limited to stroke, vessel injury, hemorrhage, and or groin hematoma.  Patient demonstrates understanding  of all risks involved with this procedure and wishes to continue.   Appropriate  consent was obtained from patient and consent is in the patient's chart. 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 Patient underwent diagnostic cerebral angiogram 1/2024 which confirmed 5mm acomm aneurysm. Patient presents today for cerebral angiogram with embolization, was started on Plavix for planned stenting. Patient reports no complaints, denies HA, weakness, numbness, tingling, CP, SOB, N/V.       Allergies: azithromycin (Hives; Rash)  penicillin (Hives; Rash)      PAST MEDICAL & SURGICAL HISTORY:  HTN (hypertension)  CVA (cerebrovascular accident)  Afib  Carpal tunnel syndrome  Cerebral aneurysm  H/O carpal tunnel repair  History of cardioversion  H/O coronary angiogram      FAMILY HISTORY:  Family history of stroke or transient ischemic attack in mother (Mother, Sibling)  FH: diabetes mellitus (Mother)      Physical Exam:  Constitutional: NAD, lying in bed  Neuro  * Mental Status:  GCS 15: Awake, alert, oriented to conversation. No aphasia or difficulty speaking. No dysarthria. Able to name objects and their function.  * Cranial Nerves: Cnii-Cnxii grossly intact. PERRL, EOMI, tongue midline, no gaze deviation  * Motor: RUE 5/5, LUE 5/5, RLE 5/5, LLE 5/5, no drift or dysmetria  * Sensory: Sensation intact to light touch  * Reflexes: not assessed   Cardiovascular: Regular rate and rhythm.  Eyes: See neurologic examination with detailed examination of eyes.  ENT: No JVD, Trachea Midline  Respiratory: non labored breathing   Gastrointestinal: Soft, nontender, nondistended.  Genitourinary: [ ] Emery, [ x ] No Emery.   Musculoskeletal: No muscle wasting noted, (See neurologic assessment for full muscle strength assessment)   Skin:  no wounds, no redness, no abrasions noted  Hematologic / Lymph / Immunologic: No bleeding from IV sites or wounds      NIH SS:  DATE: 2/8/24   TIME: 1010  1A: Level of consciousness (0-3): 0  1B: Questions (0-2): 0    1C: Commands (0-2): 0  2: Gaze (0-2): 0  3: Visual fields (0-3): 0  4: Facial palsy (0-3): 0  MOTOR:  5A: Left arm motor drift (0-4): 0  5B: Right arm motor drift (0-4): 0  6A: Left leg motor drift (0-4): 0  6B: Right leg motor drift (0-4): 0  7: Limb ataxia (0-2): 0  SENSORY:  8: Sensation (0-2): 0  SPEECH:  9: Language (0-3): 0  10: Dysarthria (0-2): 0  EXTINCTION:  11: Extinction/inattention (0-2): 0    TOTAL SCORE: 0      Labs:   1/26/24: WBC 9.81, hgb 13.4, hct 40.5, platelets 317  1/26/24: PTT 34.1, INR 1.39  1/26/24: Na 139, K 4.2, Cl 102, CO2 24, BUN 15.9, Cr 0.68, platelets 96   2/8/24: PRU 45       Assessment/Plan:   This is a 63y  year old Female  presents with acomm aneurysm. Patient presents to neuro-IR for selective cerebral angiography with embolization.    Procedure, goals, risks, benefits and alternatives  were discussed with patient.  All questions were answered.  Risks include but are not limited to stroke, vessel injury, hemorrhage, and or groin hematoma.  Patient demonstrates understanding  of all risks involved with this procedure and wishes to continue.   Appropriate  consent was obtained from patient and consent is in the patient's chart.

## 2024-02-08 NOTE — CONSULT NOTE ADULT - SUBJECTIVE AND OBJECTIVE BOX
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 Patient underwent diagnostic cerebral angiogram 1/2024 which confirmed 5mm acomm aneurysm. Patient presents today for cerebral angiogram with embolization, was started on Plavix for planned stenting. Patient reports no complaints, denies HA, weakness, numbness, tingling, CP, SOB, N/V.       PAST MEDICAL & SURGICAL HISTORY:  HTN (hypertension)  CVA (cerebrovascular accident)  Afib  Carpal tunnel syndrome  Cerebral aneurysm  H/O carpal tunnel repair  History of cardioversion  H/O coronary angiogram      FAMILY HISTORY:  Family history of stroke or transient ischemic attack in mother (Mother, Sibling)  FH: diabetes mellitus (Mother)      Allergies  azithromycin (Hives; Rash)  penicillin (Hives; Rash)      REVIEW OF SYSTEMS  Negative except as noted in HPI  CONSTITUTIONAL: No fever, weight loss, or fatigue  EYES: No eye pain, visual disturbances, or discharge  ENMT:  No difficulty hearing, tinnitus, vertigo; No sinus or throat pain  RESPIRATORY: No cough, No shortness of breath  CARDIOVASCULAR: No chest pain  GASTROINTESTINAL: No abdominal pain, no N/V  GENITOURINARY: No dysuria, frequency, hematuria, or incontinence  NEUROLOGICAL: No headaches, memory loss, loss of strength, numbness, or tremors  MUSCULOSKELETAL: No joint pain or swelling; No muscle, back, or extremity pain      HOME MEDICATIONS:  Home Medications:  DilTIAZem (Eqv-Cardizem CD) 240 mg/24 hours oral capsule, extended release: 1 cap(s) orally once a day (08 Feb 2024 09:29)  Plavix 75 mg oral tablet: 1 tab(s) orally once a day pt to start medication 1 week prior to procedure (08 Feb 2024 09:29)      MEDICATIONS:  Antibiotics:    Neuro:  acetaminophen     Tablet .. 650 milliGRAM(s) Oral every 6 hours PRN  ondansetron Injectable 4 milliGRAM(s) IV Push every 6 hours PRN    Anticoagulation:  apixaban 5 milliGRAM(s) Oral every 12 hours    OTHER:  atorvastatin 40 milliGRAM(s) Oral at bedtime  diltiazem    milliGRAM(s) Oral daily  metoprolol tartrate 100 milliGRAM(s) Oral two times a day  polyethylene glycol 3350 17 Gram(s) Oral daily  senna 2 Tablet(s) Oral at bedtime    IVF:  sodium chloride 0.9% lock flush 3 milliLiter(s) IV Push every 8 hours  sodium chloride 0.9%. 1000 milliLiter(s) IV Continuous <Continuous>      Vital Signs Last 24 Hrs  T(C): 36.3 (08 Feb 2024 09:39), Max: 36.3 (08 Feb 2024 09:39)  T(F): 97.4 (08 Feb 2024 09:39), Max: 97.4 (08 Feb 2024 09:39)  HR: 52 (08 Feb 2024 09:39) (52 - 52)  BP: 144/70 (08 Feb 2024 09:39) (144/70 - 144/70)  RR: 17 (08 Feb 2024 09:39) (17 - 17)  SpO2: 98% (08 Feb 2024 09:39) (98% - 98%)    Parameters below as of 08 Feb 2024 09:39  Patient On (Oxygen Delivery Method): room air      Physical Exam:  Constitutional: NAD, lying in bed  Neuro  * Mental Status:  GCS 15: Awake, alert, oriented to conversation. No aphasia or difficulty speaking. No dysarthria.  * Cranial Nerves: Cnii-Cnxii grossly intact. PERRL, EOMI, tongue midline, no gaze deviation  * Motor: RUE 5/5, LUE 5/5, RLE 5/5, LLE 5/5, no drift or dysmetria  * Sensory: Sensation intact to light touch  * Reflexes: not assessed   Cardiovascular: Regular rate and rhythm.  Eyes: See neurologic examination with detailed examination of eyes.  ENT: No JVD, Trachea Midline  Respiratory: non labored breathing   Gastrointestinal: Soft, nontender, nondistended.  Genitourinary: [ x ] Emery, [ ] No Emery.   Musculoskeletal: No muscle wasting noted, (See neurologic assessment for full muscle strength assessment)  Skin:  no wounds, no redness, no abrasions noted  Hematologic / Lymph / Immunologic: No bleeding from IV sites or wounds      LABS:  1/26/24: WBC 9.81, hgb 13.4, hct 40.5, platelets 317  1/26/24: PTT 34.1, INR 1.39  1/26/24: Na 139, K 4.2, Cl 102, CO2 24, BUN 15.9, Cr 0.68, platelets 96   2/8/24: PRU 45       RADIOLOGY & ADDITIONAL STUDIES:  No new imaging to review.  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 Patient underwent diagnostic cerebral angiogram 1/2024 which confirmed 5mm acomm aneurysm. Patient presents today for cerebral angiogram with embolization, was started on Plavix for planned stenting. Patient underwent acomm aneurysm embolization via MARLENY stent and coiling. Patient admitted to NSICU for post op cares.       PAST MEDICAL & SURGICAL HISTORY:  HTN (hypertension)  CVA (cerebrovascular accident)  Afib  Carpal tunnel syndrome  Cerebral aneurysm  H/O carpal tunnel repair  History of cardioversion  H/O coronary angiogram      FAMILY HISTORY:  Family history of stroke or transient ischemic attack in mother (Mother, Sibling)  FH: diabetes mellitus (Mother)      Allergies  azithromycin (Hives; Rash)  penicillin (Hives; Rash)      REVIEW OF SYSTEMS  Negative except as noted in HPI  CONSTITUTIONAL: No fever, weight loss, or fatigue  EYES: No eye pain, visual disturbances, or discharge  ENMT:  No difficulty hearing, tinnitus, vertigo; No sinus or throat pain  RESPIRATORY: No cough, No shortness of breath  CARDIOVASCULAR: No chest pain  GASTROINTESTINAL: No abdominal pain, no N/V  GENITOURINARY: No dysuria, frequency, hematuria, or incontinence  NEUROLOGICAL: No headaches, memory loss, loss of strength, numbness, or tremors  MUSCULOSKELETAL: No joint pain or swelling; No muscle, back, or extremity pain      HOME MEDICATIONS:  Home Medications:  DilTIAZem (Eqv-Cardizem CD) 240 mg/24 hours oral capsule, extended release: 1 cap(s) orally once a day (08 Feb 2024 09:29)  Plavix 75 mg oral tablet: 1 tab(s) orally once a day pt to start medication 1 week prior to procedure (08 Feb 2024 09:29)      MEDICATIONS:  Antibiotics:    Neuro:  acetaminophen     Tablet .. 650 milliGRAM(s) Oral every 6 hours PRN  ondansetron Injectable 4 milliGRAM(s) IV Push every 6 hours PRN    Anticoagulation:  apixaban 5 milliGRAM(s) Oral every 12 hours    OTHER:  atorvastatin 40 milliGRAM(s) Oral at bedtime  diltiazem    milliGRAM(s) Oral daily  metoprolol tartrate 100 milliGRAM(s) Oral two times a day  polyethylene glycol 3350 17 Gram(s) Oral daily  senna 2 Tablet(s) Oral at bedtime    IVF:  sodium chloride 0.9% lock flush 3 milliLiter(s) IV Push every 8 hours  sodium chloride 0.9%. 1000 milliLiter(s) IV Continuous <Continuous>      Vital Signs Last 24 Hrs  T(C): 36.3 (08 Feb 2024 09:39), Max: 36.3 (08 Feb 2024 09:39)  T(F): 97.4 (08 Feb 2024 09:39), Max: 97.4 (08 Feb 2024 09:39)  HR: 52 (08 Feb 2024 09:39) (52 - 52)  BP: 144/70 (08 Feb 2024 09:39) (144/70 - 144/70)  RR: 17 (08 Feb 2024 09:39) (17 - 17)  SpO2: 98% (08 Feb 2024 09:39) (98% - 98%)    Parameters below as of 08 Feb 2024 09:39  Patient On (Oxygen Delivery Method): room air      Physical Exam:  Constitutional: NAD, lying in bed  Neuro  * Mental Status:  GCS 15: Awake, alert, oriented to conversation. No aphasia or difficulty speaking. No dysarthria.  * Cranial Nerves: Cnii-Cnxii grossly intact. PERRL, EOMI, tongue midline, no gaze deviation  * Motor: RUE 5/5, LUE 5/5, RLE 5/5, LLE 5/5, no drift or dysmetria  * Sensory: Sensation intact to light touch  * Reflexes: not assessed   Cardiovascular: Regular rate and rhythm.  Eyes: See neurologic examination with detailed examination of eyes.  ENT: No JVD, Trachea Midline  Respiratory: non labored breathing   Gastrointestinal: Soft, nontender, nondistended.  Genitourinary: [ x ] Emery, [ ] No Emery.   Musculoskeletal: No muscle wasting noted, (See neurologic assessment for full muscle strength assessment)  Skin: groin soft, nontender, no hematoma, safeguard dressing in place   Hematologic / Lymph / Immunologic: No bleeding from IV sites or wounds      LABS:  1/26/24: WBC 9.81, hgb 13.4, hct 40.5, platelets 317  1/26/24: PTT 34.1, INR 1.39  1/26/24: Na 139, K 4.2, Cl 102, CO2 24, BUN 15.9, Cr 0.68, platelets 96   2/8/24: PRU 45       RADIOLOGY & ADDITIONAL STUDIES:  No new imaging to review.

## 2024-02-08 NOTE — CONSULT NOTE ADULT - TIME BILLING
63F with Acomm aneurysm, now s/p elective aneurysm embolization with MARLENY stent and coil on 2/8/24.  PMH of HTN, HLD, afib on Eliquis, CVA.    Plan:  - neurochecks  - SBP Goal   - Rhythm control: cardizem 240, metoprolol 100 BID - cont home meds  - Atorvastatin 40, cont  - diet as tolerated  - cont IV hydration till bedtime; monitor Electrolytes & Renal Function  - anti-thrombotics:  Eliquis 5 BID for afib + Plavix 75 daily  - rest as above  		  Time spent included review of relevant history, clinical examination, review of data and images, discussion of treatment with the multidisciplinary team and any consultants involved in this patient’s care.

## 2024-02-08 NOTE — PATIENT PROFILE ADULT - FUNCTIONAL ASSESSMENT - BASIC MOBILITY ASSESSMENT TYPE
Per patient he does not have a rash, but there is some redness on his nose , close to the eye, but he thinks it might be from his glasses, he said he has some redness on his eyebrow and its tender to touch , he thinks its more  since yesterday , and there is no rash on his sculp but its also tender to touch, like if he would bump his had.   Admission

## 2024-02-09 ENCOUNTER — TRANSCRIPTION ENCOUNTER (OUTPATIENT)
Age: 64
End: 2024-02-09

## 2024-02-09 ENCOUNTER — APPOINTMENT (OUTPATIENT)
Dept: NEUROLOGY | Facility: CLINIC | Age: 64
End: 2024-02-09

## 2024-02-09 VITALS
HEART RATE: 61 BPM | OXYGEN SATURATION: 95 % | SYSTOLIC BLOOD PRESSURE: 142 MMHG | DIASTOLIC BLOOD PRESSURE: 68 MMHG | RESPIRATION RATE: 18 BRPM

## 2024-02-09 LAB
ANION GAP SERPL CALC-SCNC: 12 MMOL/L — SIGNIFICANT CHANGE UP (ref 5–17)
BUN SERPL-MCNC: 12.8 MG/DL — SIGNIFICANT CHANGE UP (ref 8–20)
CALCIUM SERPL-MCNC: 8.8 MG/DL — SIGNIFICANT CHANGE UP (ref 8.4–10.5)
CHLORIDE SERPL-SCNC: 101 MMOL/L — SIGNIFICANT CHANGE UP (ref 96–108)
CO2 SERPL-SCNC: 24 MMOL/L — SIGNIFICANT CHANGE UP (ref 22–29)
CREAT SERPL-MCNC: 0.57 MG/DL — SIGNIFICANT CHANGE UP (ref 0.5–1.3)
EGFR: 101 ML/MIN/1.73M2 — SIGNIFICANT CHANGE UP
GLUCOSE SERPL-MCNC: 93 MG/DL — SIGNIFICANT CHANGE UP (ref 70–99)
HCT VFR BLD CALC: 37.6 % — SIGNIFICANT CHANGE UP (ref 34.5–45)
HGB BLD-MCNC: 12.5 G/DL — SIGNIFICANT CHANGE UP (ref 11.5–15.5)
MAGNESIUM SERPL-MCNC: 1.9 MG/DL — SIGNIFICANT CHANGE UP (ref 1.6–2.6)
MCHC RBC-ENTMCNC: 28.7 PG — SIGNIFICANT CHANGE UP (ref 27–34)
MCHC RBC-ENTMCNC: 33.2 GM/DL — SIGNIFICANT CHANGE UP (ref 32–36)
MCV RBC AUTO: 86.2 FL — SIGNIFICANT CHANGE UP (ref 80–100)
PHOSPHATE SERPL-MCNC: 3.3 MG/DL — SIGNIFICANT CHANGE UP (ref 2.4–4.7)
PLATELET # BLD AUTO: 239 K/UL — SIGNIFICANT CHANGE UP (ref 150–400)
POTASSIUM SERPL-MCNC: 3.9 MMOL/L — SIGNIFICANT CHANGE UP (ref 3.5–5.3)
POTASSIUM SERPL-SCNC: 3.9 MMOL/L — SIGNIFICANT CHANGE UP (ref 3.5–5.3)
RBC # BLD: 4.36 M/UL — SIGNIFICANT CHANGE UP (ref 3.8–5.2)
RBC # FLD: 12.9 % — SIGNIFICANT CHANGE UP (ref 10.3–14.5)
SODIUM SERPL-SCNC: 137 MMOL/L — SIGNIFICANT CHANGE UP (ref 135–145)
WBC # BLD: 9.72 K/UL — SIGNIFICANT CHANGE UP (ref 3.8–10.5)
WBC # FLD AUTO: 9.72 K/UL — SIGNIFICANT CHANGE UP (ref 3.8–10.5)

## 2024-02-09 PROCEDURE — C1876: CPT

## 2024-02-09 PROCEDURE — 97163 PT EVAL HIGH COMPLEX 45 MIN: CPT

## 2024-02-09 PROCEDURE — C1769: CPT

## 2024-02-09 PROCEDURE — 84100 ASSAY OF PHOSPHORUS: CPT

## 2024-02-09 PROCEDURE — 61624 TCAT PERM OCCLS/EMBOLJ CNS: CPT

## 2024-02-09 PROCEDURE — C1760: CPT

## 2024-02-09 PROCEDURE — 80048 BASIC METABOLIC PNL TOTAL CA: CPT

## 2024-02-09 PROCEDURE — 85027 COMPLETE CBC AUTOMATED: CPT

## 2024-02-09 PROCEDURE — 36415 COLL VENOUS BLD VENIPUNCTURE: CPT

## 2024-02-09 PROCEDURE — 99232 SBSQ HOSP IP/OBS MODERATE 35: CPT

## 2024-02-09 PROCEDURE — 36224 PLACE CATH CAROTD ART: CPT

## 2024-02-09 PROCEDURE — 85576 BLOOD PLATELET AGGREGATION: CPT

## 2024-02-09 PROCEDURE — 83735 ASSAY OF MAGNESIUM: CPT

## 2024-02-09 PROCEDURE — C1887: CPT

## 2024-02-09 PROCEDURE — 75894 X-RAYS TRANSCATH THERAPY: CPT

## 2024-02-09 PROCEDURE — C1889: CPT

## 2024-02-09 PROCEDURE — C1894: CPT

## 2024-02-09 PROCEDURE — 75898 FOLLOW-UP ANGIOGRAPHY: CPT

## 2024-02-09 RX ORDER — CHLORHEXIDINE GLUCONATE 213 G/1000ML
1 SOLUTION TOPICAL DAILY
Refills: 0 | Status: DISCONTINUED | OUTPATIENT
Start: 2024-02-09 | End: 2024-02-09

## 2024-02-09 RX ORDER — ACETAMINOPHEN 500 MG
2 TABLET ORAL
Qty: 0 | Refills: 0 | DISCHARGE
Start: 2024-02-09

## 2024-02-09 RX ADMIN — Medication 240 MILLIGRAM(S): at 08:33

## 2024-02-09 RX ADMIN — APIXABAN 5 MILLIGRAM(S): 2.5 TABLET, FILM COATED ORAL at 06:50

## 2024-02-09 RX ADMIN — SODIUM CHLORIDE 3 MILLILITER(S): 9 INJECTION INTRAMUSCULAR; INTRAVENOUS; SUBCUTANEOUS at 06:47

## 2024-02-09 RX ADMIN — CLOPIDOGREL BISULFATE 75 MILLIGRAM(S): 75 TABLET, FILM COATED ORAL at 06:50

## 2024-02-09 RX ADMIN — Medication 100 MILLIGRAM(S): at 06:50

## 2024-02-09 NOTE — PROGRESS NOTE ADULT - TIME BILLING
Time spent included review of relevant history, clinical examination, review of data and images, discussion of treatment with the multidisciplinary team and any consultants involved in this patient’s care.

## 2024-02-09 NOTE — DISCHARGE NOTE PROVIDER - NSDCFUADDINST_GEN_ALL_CORE_FT
Please make an appointment for follow up with interventional neuroradiologist Dr. Ko Zhou's office in 1-2 weeks for follow up. Call [phone] to schedule an appointment. Keep puncture site clean and dry. No creams, lotions, or ointments to puncture area. Ok to shower.    NO heavy lifting, strenuous activity, twisting, bending, driving, or working until cleared by your physician.  Return to ER immediately for any of the following: swelling or excessive bruising at puncture site, fever, bleeding, new onset numbness/tingling/weakness, nausea and/or vomiting, chest pain, shortness of breath, confusion, seizure, altered mental status, urinary and/or fecal incontinence or retention.

## 2024-02-09 NOTE — DISCHARGE NOTE PROVIDER - HOSPITAL COURSE
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 Patient underwent diagnostic cerebral angiogram 1/2024 which confirmed 5mm acomm aneurysm. Patient presented for cerebral angiogram with embolization, was started on Plavix for planned stenting. Patient underwent acomm aneurysm embolization via MARLENY stent and coiling on 2/8 without complication. P2Y12 45, no medication adjustments required. Patient monitored post procedure in NSICU without issue or complication. Exam remained stable. Patient seen by PT/OT deemed stable for discharge with no needs. Patient cleared for discharge home.

## 2024-02-09 NOTE — DISCHARGE NOTE PROVIDER - NSDCMRMEDTOKEN_GEN_ALL_CORE_FT
acetaminophen 325 mg oral tablet: 2 tab(s) orally every 6 hours As needed Mild Pain (1 - 3)  apixaban 5 mg oral tablet: 1 tab(s) orally every 12 hours  atorvastatin 40 mg oral tablet: 1 tab(s) orally once a day (at bedtime)  DilTIAZem (Eqv-Cardizem CD) 240 mg/24 hours oral capsule, extended release: 1 cap(s) orally once a day  Metoprolol Tartrate 100 mg oral tablet: 1 tab(s) orally every 12 hours  Plavix 75 mg oral tablet: 1 tab(s) orally once a day pt to start medication 1 week prior to procedure

## 2024-02-09 NOTE — PHYSICAL THERAPY INITIAL EVALUATION ADULT - SENSORY TESTS
(+) eye tracking bilaterally in all directions; (+) acuity in all fields; (+) finger to thumb coordination BUEs intact

## 2024-02-09 NOTE — PHYSICAL THERAPY INITIAL EVALUATION ADULT - ADDITIONAL COMMENTS
Pt lives alone in a private home in a [place] community. No steps to navigate. Pt was independent PTA without an assist device. Pt owns no DME however, has a built in shower seat and grab bars in her bathroom. Pt has her daughter nearby for support

## 2024-02-09 NOTE — DISCHARGE NOTE PROVIDER - NSDCFUSCHEDAPPT_GEN_ALL_CORE_FT
Ny Buchanan  [facility] Physician [facility]  FAMILYMED [address]  Scheduled Appointment: 02/21/2024    Elliot Mishra  [facility]  CARDIOLOGY [address]  Scheduled Appointment: 02/22/2024    Ko Zhou  [facility]  NEUROLOGY [address]  Scheduled Appointment: 02/27/2024

## 2024-02-09 NOTE — OCCUPATIONAL THERAPY INITIAL EVALUATION ADULT - DIAGNOSIS, OT EVAL
63 year old Female with PMH CVA, afib on Eliquis who presented for elective acomm aneurysm embolization via MARLENY stent and coiling (2/8)

## 2024-02-09 NOTE — OCCUPATIONAL THERAPY INITIAL EVALUATION ADULT - PERTINENT HX OF CURRENT PROBLEM, REHAB EVAL
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 Pt underwent diagnostic cerebral angiogram 1/2024 which confirmed 5mm acomm aneurysm.

## 2024-02-09 NOTE — PROGRESS NOTE ADULT - SUBJECTIVE AND OBJECTIVE BOX
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 Patient underwent diagnostic cerebral angiogram 1/2024 which confirmed 5mm acomm aneurysm. Patient presents today for cerebral angiogram with embolization, was started on Plavix for planned stenting. Patient underwent acomm aneurysm embolization via MARLENY stent and coiling. (02/09/2024)    O/n events: none reported.    ICU Vital Signs Last 24 Hrs  T(C): 36.8 (09 Feb 2024 11:16), Max: 37 (08 Feb 2024 20:17)  T(F): 98.2 (09 Feb 2024 11:16), Max: 98.6 (08 Feb 2024 20:17)  HR: 58 (09 Feb 2024 11:00) (49 - 120)  BP: 133/67 (09 Feb 2024 11:00) (123/65 - 149/106)  BP(mean): 80 (09 Feb 2024 11:00) (80 - 120)  ABP: 131/56 (09 Feb 2024 04:00) (111/53 - 165/76)  ABP(mean): 83 (09 Feb 2024 04:00) (72 - 109)  RR: 17 (09 Feb 2024 11:00) (13 - 24)  SpO2: 98% (09 Feb 2024 11:00) (95% - 100%)    O2 Parameters below as of 08 Feb 2024 20:00  Patient On (Oxygen Delivery Method): room air      Labs, imaging reviewed.      Physical Exam:  Constitutional: NAD, sitting in a chair.  Neuro  * Mental Status:  Awake, alert, oriented to conversation. No aphasia or difficulty speaking. No dysarthria.   * PERRLA, EOMI, tongue midline, no gaze deviation.  * Motor: RUE 5/5, LUE 5/5, RLE 5/5, LLE 5/5, no drift   * Sensory: Sensation intact to light touch    CTAB  S1S2 present  Abd soft, NT, ND.  No peripheral swelling.  Groin: soft, nontender, no hematoma, dressing in place.    
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 Patient underwent diagnostic cerebral angiogram 1/2024 which confirmed 5mm acomm aneurysm. Patient presents today for cerebral angiogram with embolization, was started on Plavix for planned stenting. Patient underwent acomm aneurysm embolization via MARLENY stent and coiling.       Interval history:  Patient seen and examined by neuro IR team. Patient POD0 from acomm aneurysm embolization. Patient admits to mild nausea and throat pain but otherwise denies groin pain, weakness, numbness, tingling.       Vital Signs Last 24 Hrs  T(C): 36.2 (08 Feb 2024 13:15), Max: 36.3 (08 Feb 2024 09:39)  T(F): 97.2 (08 Feb 2024 13:15), Max: 97.4 (08 Feb 2024 09:39)  HR: 61 (08 Feb 2024 14:45) (49 - 61)  BP: 136/58 (08 Feb 2024 13:15) (136/58 - 144/70)  BP(mean): 85 (08 Feb 2024 13:15) (85 - 85)  RR: 20 (08 Feb 2024 14:45) (14 - 20)  SpO2: 99% (08 Feb 2024 14:45) (92% - 99%)    Parameters below as of 08 Feb 2024 13:15    O2 Flow (L/min): 2      Physical Exam:  Constitutional: NAD, lying in bed  Neuro  * Mental Status:  GCS 15: Awake, alert, oriented to conversation. No aphasia or difficulty speaking. No dysarthria.   * Cranial Nerves: Cnii-Cnxii grossly intact. PERRL, EOMI, tongue midline, no gaze deviation  * Motor: RUE 5/5, LUE 5/5, RLE 5/5, LLE 5/5, no drift   * Sensory: Sensation intact to light touch  * Reflexes: not assessed   Groin: soft, nontender, no hematoma, safeguard dressing in place       LABS:  1/26/24: WBC 9.81, hgb 13.4, hct 40.5, platelets 317  1/26/24: PTT 34.1, INR 1.39  1/26/24: Na 139, K 4.2, Cl 102, CO2 24, BUN 15.9, Cr 0.68, platelets 96   2/8/24: PRU 45       Medications:  MEDICATIONS  (STANDING):  apixaban 5 milliGRAM(s) Oral every 12 hours  atorvastatin 40 milliGRAM(s) Oral at bedtime  diltiazem    milliGRAM(s) Oral daily  metoprolol tartrate 100 milliGRAM(s) Oral two times a day  polyethylene glycol 3350 17 Gram(s) Oral daily  senna 2 Tablet(s) Oral at bedtime  sodium chloride 0.9% lock flush 3 milliLiter(s) IV Push every 8 hours  sodium chloride 0.9%. 1000 milliLiter(s) (75 mL/Hr) IV Continuous <Continuous>    MEDICATIONS  (PRN):  acetaminophen     Tablet .. 650 milliGRAM(s) Oral every 6 hours PRN Mild Pain (1 - 3)  ondansetron Injectable 4 milliGRAM(s) IV Push every 6 hours PRN Nausea and/or Vomiting      RADIOLOGY & ADDITIONAL STUDIES:  No new neuro IR imaging to review

## 2024-02-09 NOTE — PROGRESS NOTE ADULT - ASSESSMENT
Assessment:  63F with PMH CVA, afib on Eliquis who presented for elective acomm aneurysm embolization, admitted to NSICU for post op cares.   - POD 0   - Exam and groin intact      Plan:  - Discussed with Dr. Zhou  - Q1 hour neuro checks  - SBP goal   - No imaging needed  - Continue Eliquis 5 BID and Plavix 75   - DVT prophylaxis; SCDs, Eliquis  - PT/OT  - Plan for d/c tomorrow pending hospital course 
63F with Acomm aneurysm, now s/p elective aneurysm embolization with MARLENY stent and coil on 2/8/24.  PMH of HTN, HLD, a.fib on Eliquis, CVA.    Plan:  - pt is being d/c'ed home; cleared by [facility] PT  - cont home meds  - anti-thrombotics:  Eliquis 5 BID for afib + Plavix 75 daily, cont   - outpt f/up with RIC[facility]

## 2024-02-09 NOTE — DISCHARGE NOTE NURSING/CASE MANAGEMENT/SOCIAL WORK - PATIENT PORTAL LINK FT
You can access the FollowMyHealth Patient Portal offered by [facility] by registering at the following website: [email or web address]. By joining [employer] [employer]’s FollowMyHealth portal, you will also be able to view your health information using other applications (apps) compatible with our system.

## 2024-02-09 NOTE — DISCHARGE NOTE PROVIDER - NSDCCPCAREPLAN_GEN_ALL_CORE_FT
PRINCIPAL DISCHARGE DIAGNOSIS  Diagnosis: S/P cerebral aneurysm repair  Assessment and Plan of Treatment:       SECONDARY DISCHARGE DIAGNOSES  Diagnosis: Cerebral aneurysm, nonruptured  Assessment and Plan of Treatment:

## 2024-02-09 NOTE — DISCHARGE NOTE PROVIDER - NSDCCPTREATMENT_GEN_ALL_CORE_FT
PRINCIPAL PROCEDURE  Procedure: Cerebral angiography with embolization of intracranial aneurysm  Findings and Treatment:

## 2024-02-09 NOTE — OCCUPATIONAL THERAPY INITIAL EVALUATION ADULT - GENERAL OBSERVATIONS, REHAB EVAL
Left pt as received OOB in chair, NAD, +IV lock, +Tele//BP on RA A&Ox4. Pre/post pain 0/10. Pt agreeable to OT evaluation.

## 2024-02-09 NOTE — PHYSICAL THERAPY INITIAL EVALUATION ADULT - PERTINENT HX OF CURRENT PROBLEM, REHAB EVAL
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 Patient underwent diagnostic cerebral angiogram 1/2024 which confirmed 5mm acomm aneurysm. Patient presents today for cerebral angiogram with embolization, was started on Plavix for planned stenting. Patient underwent acomm aneurysm embolization via MARLENY stent and coiling on 2/8/24. Patient admitted to NSICU for post op care

## 2024-02-09 NOTE — DISCHARGE NOTE PROVIDER - CARE PROVIDER_API CALL
Ko Zhou  Interventional Neuroradiology  [address] [place] [id number]  Phone: [phone]  Fax: [phone]  Established Patient  Follow Up Time:

## 2024-02-10 ENCOUNTER — TRANSCRIPTION ENCOUNTER (OUTPATIENT)
Age: 64
End: 2024-02-10

## 2024-02-12 ENCOUNTER — TRANSCRIPTION ENCOUNTER (OUTPATIENT)
Age: 64
End: 2024-02-12

## 2024-02-12 ENCOUNTER — NON-APPOINTMENT (OUTPATIENT)
Age: 64
End: 2024-02-12

## 2024-02-21 ENCOUNTER — APPOINTMENT (OUTPATIENT)
Dept: FAMILY MEDICINE | Facility: CLINIC | Age: 64
End: 2024-02-21
Payer: COMMERCIAL

## 2024-02-21 ENCOUNTER — TRANSCRIPTION ENCOUNTER (OUTPATIENT)
Age: 64
End: 2024-02-21

## 2024-02-21 VITALS
SYSTOLIC BLOOD PRESSURE: 138 MMHG | HEIGHT: 66 IN | RESPIRATION RATE: 15 BRPM | HEART RATE: 53 BPM | BODY MASS INDEX: 36.16 KG/M2 | WEIGHT: 225 LBS | TEMPERATURE: 98.2 F | OXYGEN SATURATION: 98 % | DIASTOLIC BLOOD PRESSURE: 84 MMHG

## 2024-02-21 DIAGNOSIS — M54.50 LOW BACK PAIN, UNSPECIFIED: ICD-10-CM

## 2024-02-21 DIAGNOSIS — Z23 ENCOUNTER FOR IMMUNIZATION: ICD-10-CM

## 2024-02-21 DIAGNOSIS — F41.9 ANXIETY DISORDER, UNSPECIFIED: ICD-10-CM

## 2024-02-21 DIAGNOSIS — S16.1XXA STRAIN OF MUSCLE, FASCIA AND TENDON AT NECK LEVEL, INITIAL ENCOUNTER: ICD-10-CM

## 2024-02-21 DIAGNOSIS — F32.A DEPRESSION, UNSPECIFIED: ICD-10-CM

## 2024-02-21 PROCEDURE — 90677 PCV20 VACCINE IM: CPT

## 2024-02-21 PROCEDURE — G0009: CPT

## 2024-02-21 PROCEDURE — 99495 TRANSJ CARE MGMT MOD F2F 14D: CPT

## 2024-02-21 NOTE — HISTORY OF PRESENT ILLNESS
[Post-hospitalization from ___ Hospital] : Post-hospitalization from [unfilled] Hospital [Admitted on: ___] : The patient was admitted on [unfilled] [Discharged on ___] : discharged on [unfilled] [Discharge Summary] : discharge summary [Discharge Med List] : discharge medication list [Med Reconciliation] : medication reconciliation has been completed [Patient Contacted By: ____] : and contacted by [unfilled] [FreeTextEntry2] : She was admitted electively for the cerebral aneurysm repair and it went well. She is still very upset about having the stroke and cried easily.  She is in counseling twice a week. She feels weak inside.

## 2024-02-21 NOTE — REVIEW OF SYSTEMS
[Fatigue] : fatigue [Suicidal] : not suicidal [Anxiety] : anxiety [Depression] : depression [Negative] : Heme/Lymph

## 2024-02-21 NOTE — PLAN
[FreeTextEntry1] : The discharge summary was reviewed.  She was given significant emotional support throughout the visit.  Prevnar 20 was administered.  She will follow up with specialists as scheduled and with me for a well visit or sooner prn.

## 2024-02-21 NOTE — PHYSICAL EXAM
[No Acute Distress] : no acute distress [Well Nourished] : well nourished [Well Developed] : well developed [Well-Appearing] : well-appearing [Normal Voice/Communication] : normal voice/communication [No Respiratory Distress] : no respiratory distress  [No Accessory Muscle Use] : no accessory muscle use [Clear to Auscultation] : lungs were clear to auscultation bilaterally [Normal Rate] : normal rate  [Regular Rhythm] : with a regular rhythm [Normal S1, S2] : normal S1 and S2 [de-identified] : she cried throughout the visit

## 2024-02-22 ENCOUNTER — APPOINTMENT (OUTPATIENT)
Dept: CARDIOLOGY | Facility: CLINIC | Age: 64
End: 2024-02-22
Payer: COMMERCIAL

## 2024-02-22 VITALS
WEIGHT: 230 LBS | HEIGHT: 66 IN | DIASTOLIC BLOOD PRESSURE: 70 MMHG | BODY MASS INDEX: 36.96 KG/M2 | HEART RATE: 59 BPM | SYSTOLIC BLOOD PRESSURE: 162 MMHG | OXYGEN SATURATION: 96 %

## 2024-02-22 VITALS — SYSTOLIC BLOOD PRESSURE: 160 MMHG | DIASTOLIC BLOOD PRESSURE: 70 MMHG

## 2024-02-22 DIAGNOSIS — I48.0 PAROXYSMAL ATRIAL FIBRILLATION: ICD-10-CM

## 2024-02-22 DIAGNOSIS — I10 ESSENTIAL (PRIMARY) HYPERTENSION: ICD-10-CM

## 2024-02-22 DIAGNOSIS — E66.9 OBESITY, UNSPECIFIED: ICD-10-CM

## 2024-02-22 PROCEDURE — G2211 COMPLEX E/M VISIT ADD ON: CPT

## 2024-02-22 PROCEDURE — 99215 OFFICE O/P EST HI 40 MIN: CPT

## 2024-02-27 ENCOUNTER — APPOINTMENT (OUTPATIENT)
Dept: NEUROLOGY | Facility: CLINIC | Age: 64
End: 2024-02-27
Payer: COMMERCIAL

## 2024-02-27 VITALS
SYSTOLIC BLOOD PRESSURE: 166 MMHG | DIASTOLIC BLOOD PRESSURE: 80 MMHG | HEIGHT: 66 IN | BODY MASS INDEX: 36.96 KG/M2 | WEIGHT: 230 LBS

## 2024-02-27 DIAGNOSIS — I63.431 CEREBRAL INFARCTION DUE TO EMBOLISM OF RIGHT POSTERIOR CEREBRAL ARTERY: ICD-10-CM

## 2024-02-27 PROCEDURE — 99215 OFFICE O/P EST HI 40 MIN: CPT

## 2024-02-27 PROCEDURE — G2211 COMPLEX E/M VISIT ADD ON: CPT

## 2024-02-28 ENCOUNTER — TRANSCRIPTION ENCOUNTER (OUTPATIENT)
Age: 64
End: 2024-02-28

## 2024-02-29 ENCOUNTER — NON-APPOINTMENT (OUTPATIENT)
Age: 64
End: 2024-02-29

## 2024-03-01 ENCOUNTER — TRANSCRIPTION ENCOUNTER (OUTPATIENT)
Age: 64
End: 2024-03-01

## 2024-03-03 NOTE — DISCUSSION/SUMMARY
[FreeTextEntry1] : 63-year-old woman with pmHx of HTN, hospitalized at [facility] 7/1/2023 as a transfer from [facility] found to have a right posterior cerebral artery occlusion status post intravenous thrombolysis with tenecteplase and successful TICI3 revascularization with mechanical thrombectomy, newly diagnosed atrial fibrillation anticoagulated with Eliquis, and an incidental, unruptured 5.8 mm multilobulated anterior communicating artery aneurysm s/p embolization 2/8/2024 with flow diversion and coiling with mild residual filling of the neck.   She is doing well post procedure. Groin site healed well. She is complaint on Plavix 75 mg daily with no issues, and we will plan to continue for at least 6 months.  Neurologically she has recovered well with return of her vision. She has residual fatigue, poor endurance and continues to deal with depression and anxiety post stroke. She is talking to a therapist 2x/week. She will continue Eliquis 5 mg BID and atorvastatin 20 mg daily for secondary stroke prevention. I did  the patient about the increased risk of bleeding on concomitant therapy with Eliquis and clopidogrel. She conveys good understanding.   Patient was educated about signs and symptoms of stroke and was counseled to contact 911 upon emergence of stroke like symptoms. Intravenous thrombolysis and mechanical thrombectomy was also counseled and educated to the patient today.   PLAN: 1. Continue Eliquis 5mg BID 2. Continue atorvastatin 20mg at bedtime. 3. Continue Plavix 75 mg daily 4. MRA head with NOVA in 3 months  5. Not cleared to work at this time, will re-evaluate at follow up.   Follow up in 3 months, or sooner if needed.   All of the patient's questions and concerns were addressed.

## 2024-03-03 NOTE — HISTORY OF PRESENT ILLNESS
[FreeTextEntry1] : Ms. Odell, a 63-year-old woman with pmHx of HTN, hospitalized at [facility] 7/1/2023 as a transfer from [facility] found to have a right posterior cerebral artery occlusion status post intravenous thrombolysis with tenecteplase and successful TICI3 revascularization with mechanical thrombectomy, newly diagnosed atrial fibrillation now anticoagulated with Eliquis, and an incidental, unruptured 5 mm anterior communicating artery aneurysm, presents today for follow up.   She underwent successful embolization of her anterior communicating artery aneurysm on 2/8/2024 with flow diversion and coiling with mild residual filling of the neck. She is doing well post procedure. Groin site healed well... No bruising or pain to the groin site. She continues to take Eliquis as directed and Plavix 75 mg daily, tolerating both well. She had 3 episodes where she noticed some blood when blowing her nose, but she denies any s/s of bleeding. Denies recurrent or new TIA/Stroke symptoms.

## 2024-03-06 ENCOUNTER — TRANSCRIPTION ENCOUNTER (OUTPATIENT)
Age: 64
End: 2024-03-06

## 2024-04-17 ENCOUNTER — NON-APPOINTMENT (OUTPATIENT)
Age: 64
End: 2024-04-17

## 2024-04-17 ENCOUNTER — APPOINTMENT (OUTPATIENT)
Dept: FAMILY MEDICINE | Facility: CLINIC | Age: 64
End: 2024-04-17
Payer: MEDICARE

## 2024-04-17 VITALS
TEMPERATURE: 98 F | WEIGHT: 227 LBS | BODY MASS INDEX: 36.48 KG/M2 | HEIGHT: 66 IN | RESPIRATION RATE: 14 BRPM | DIASTOLIC BLOOD PRESSURE: 90 MMHG | HEART RATE: 120 BPM | OXYGEN SATURATION: 98 % | SYSTOLIC BLOOD PRESSURE: 118 MMHG

## 2024-04-17 VITALS — DIASTOLIC BLOOD PRESSURE: 76 MMHG | SYSTOLIC BLOOD PRESSURE: 112 MMHG

## 2024-04-17 DIAGNOSIS — Z00.00 ENCOUNTER FOR GENERAL ADULT MEDICAL EXAMINATION W/OUT ABNORMAL FINDINGS: ICD-10-CM

## 2024-04-17 PROCEDURE — G0402 INITIAL PREVENTIVE EXAM: CPT

## 2024-04-17 PROCEDURE — 93000 ELECTROCARDIOGRAM COMPLETE: CPT | Mod: 59

## 2024-04-17 PROCEDURE — 36415 COLL VENOUS BLD VENIPUNCTURE: CPT

## 2024-04-17 NOTE — HISTORY OF PRESENT ILLNESS
[FreeTextEntry1] : Patient presents for physical exam also has 2 new medications.  [de-identified] : She feels two lumps in the right side of her throat and has a little pain in the right ear.

## 2024-04-17 NOTE — PLAN
[FreeTextEntry1] : An EKG was performed in the office today and shows atrial fibrillation. She will continue taking her current medications and will follow up with Dr Wiggins. Labs were ordered and will be drawn in the office today to further assess her health and she will follow up for a PAP appointment and will schedule the mammogram appointment.

## 2024-04-17 NOTE — HEALTH RISK ASSESSMENT
[Very Good] : ~his/her~ current health as very good [Good] : ~his/her~  mood as  good [No] : In the past 12 months have you used drugs other than those required for medical reasons? No [No falls in past year] : Patient reported no falls in the past year [Patient reported mammogram was normal] : Patient reported mammogram was normal [Patient reported PAP Smear was normal] : Patient reported PAP Smear was normal [HIV test declined] : HIV test declined [Hepatitis C test declined] : Hepatitis C test declined [None] : None [Alone] : lives alone [On disability] : on disability [] :  [Feels Safe at Home] : Feels safe at home [Fully functional (bathing, dressing, toileting, transferring, walking, feeding)] : Fully functional (bathing, dressing, toileting, transferring, walking, feeding) [Fully functional (using the telephone, shopping, preparing meals, housekeeping, doing laundry, using] : Fully functional and needs no help or supervision to perform IADLs (using the telephone, shopping, preparing meals, housekeeping, doing laundry, using transportation, managing medications and managing finances) [Reports normal functional visual acuity (ie: able to read med bottle)] : Reports normal functional visual acuity [Smoke Detector] : smoke detector [Carbon Monoxide Detector] : carbon monoxide detector [Safety elements used in home] : safety elements used in home [Seat Belt] :  uses seat belt [Sunscreen] : uses sunscreen [Patient/Caregiver not ready to engage] : , patient/caregiver not ready to engage [Never] : Never [FreeTextEntry1] : see HPI [de-identified] : for the aneurysm repair [de-identified] : Dr Zhou (Neuro), Dr Baum (Car), Dr Black (GI) [de-identified] : walks [de-identified] : healthy [Change in mental status noted] : No change in mental status noted [Language] : denies difficulty with language [Behavior] : denies difficulty with behavior [Learning/Retaining New Information] : denies difficulty learning/retaining new information [Handling Complex Tasks] : denies difficulty handling complex tasks [Reasoning] : denies difficulty with reasoning [Spatial Ability and Orientation] : denies difficulty with spatial ability and orientation [Reports changes in hearing] : Reports no changes in hearing [Reports changes in vision] : Reports no changes in vision [Reports changes in dental health] : Reports no changes in dental health [Guns at Home] : no guns at home [Travel to Developing Areas] : does not  travel to developing areas [TB Exposure] : is not being exposed to tuberculosis [Caregiver Concerns] : does not have caregiver concerns [MammogramDate] : 03/23 [PapSmearDate] : 05/21 [ColonoscopyDate] : 08/18 [ColonoscopyComments] : follow up in 10 years [AdvancecareDate] : 04/24

## 2024-04-17 NOTE — PHYSICAL EXAM
[No Acute Distress] : no acute distress [Well Nourished] : well nourished [Well Developed] : well developed [Well-Appearing] : well-appearing [Normal Voice/Communication] : normal voice/communication [Normal Sclera/Conjunctiva] : normal sclera/conjunctiva [Normal Outer Ear/Nose] : the outer ears and nose were normal in appearance [Supple] : supple [No Respiratory Distress] : no respiratory distress  [No Accessory Muscle Use] : no accessory muscle use [Clear to Auscultation] : lungs were clear to auscultation bilaterally [No Edema] : there was no peripheral edema [Soft] : abdomen soft [Non Tender] : non-tender [Non-distended] : non-distended [Normal Mood] : the mood was normal [de-identified] : irregular

## 2024-05-06 ENCOUNTER — NON-APPOINTMENT (OUTPATIENT)
Age: 64
End: 2024-05-06

## 2024-05-28 ENCOUNTER — APPOINTMENT (OUTPATIENT)
Dept: NEUROLOGY | Facility: CLINIC | Age: 64
End: 2024-05-28
Payer: MEDICARE

## 2024-05-28 VITALS — BODY MASS INDEX: 36.48 KG/M2 | HEIGHT: 66 IN | OXYGEN SATURATION: 98 % | WEIGHT: 227 LBS | HEART RATE: 90 BPM

## 2024-05-28 DIAGNOSIS — I63.9 CEREBRAL INFARCTION, UNSPECIFIED: ICD-10-CM

## 2024-05-28 PROCEDURE — G2211 COMPLEX E/M VISIT ADD ON: CPT

## 2024-05-28 PROCEDURE — 99215 OFFICE O/P EST HI 40 MIN: CPT

## 2024-05-28 NOTE — PHYSICAL EXAM
[FreeTextEntry1] : GENERAL APPEARANCE: Well developed, well-nourished woman in no acute distress.   NEUROLOGIC EXAM: MENTAL STATUS: Alert and Oriented to person, place and time. Speech is fluent, without aphasia or dysarthria. CRANIAL NERVES: CN 2: Visual fields are full to confrontation. CN 3, 4, 6: Extraocular movements are intact. No nystagmus or ophthalmoplegia is evident. Pupils are equally round and reactive to light. CN 5: Facial sensation is intact to light touch in all 3 divisions. CN 7: Facial excursion is full and symmetric bilaterally. MOTOR: Strength is 5/5 throughout for age and stature. No orbiting or drift noted. SENSORY: Intact to light touch and perception in all four extremities without extinction to double simultaneous stimulation. COORD: Finger to nose testing without dysmetria bilaterally. GAIT: Normal station and mildly wobbly gait.  NIHSS-0 mRS- 1

## 2024-05-28 NOTE — HISTORY OF PRESENT ILLNESS
[FreeTextEntry1] : Ms. Odell, a 63-year-old woman with history of HTN, hospitalized at [facility] 7/1/2023 as a transfer from [facility] found to have a right posterior cerebral artery occlusion status post intravenous thrombolysis with tenecteplase and successful TICI3 revascularization with mechanical thrombectomy, newly diagnosed atrial fibrillation now anticoagulated with Eliquis, and an incidental, unruptured 5 mm anterior communicating artery aneurysm now almost 4 months s/p successful aneurysm embolization with flow diversion and coiling, presents today for follow up. She continues to take Eliquis as directed and Plavix 75 mg daily, tolerating both well. She notices easy bruising but no significant bleeding. She is scheduled for repeat MIRANDA and cardioversion with Dr. Wiggins tomorrow and cardiac ablation July 10th. MRA NOVA planned for June 11th. She continues to talk to her therapist twice a week with improvement in her mood; however, she does notice she continues to cry easily. Denies recurrent or new TIA/Stroke symptoms. Denies suicidal or homicidal ideation.

## 2024-05-28 NOTE — DISCUSSION/SUMMARY
[FreeTextEntry1] : 63-year-old woman with pmHx of HTN, hospitalized at [facility] 7/1/2023 as a transfer from [facility] found to have a right posterior cerebral artery occlusion status post intravenous thrombolysis with tenecteplase and successful TICI3 revascularization with mechanical thrombectomy, newly diagnosed atrial fibrillation anticoagulated with Eliquis, and an incidental, unruptured 5.8 mm multilobulated anterior communicating artery aneurysm s/p embolization 2/8/2024 with flow diversion and coiling.   She is complaint on Plavix 75 mg daily and Eliquis 5 mg twice daily with no issues.  Pending MRA with [facility] - attempted, but will need sedation, planned for 6/11/2024. Will plan to continue both medications for at least another 3 months and then discuss possibly stopping Plavix after repeat cerebral angiogram in August. She will also continue atorvastatin 20 mg daily for secondary stroke prevention.  I did  the patient about the increased risk of bleeding on concomitant therapy with Eliquis and clopidogrel. She conveys good understanding.   Neurologically she remains intact. She has residual fatigue, poor endurance and continues to deal with depression and anxiety post stroke. She is continuing to talk to her therapist 2x/week. She notices that she cries easily. Will trial sertraline 25 mg daily. She is aware that it may take a few weeks for her to feel any difference on the medication. She was educated on potential side effects including drowsiness, nausea.   Patient was educated about signs and symptoms of stroke and was counseled to contact 911 upon emergence of stroke-like symptoms. Intravenous thrombolysis and mechanical thrombectomy was also counseled and educated to the patient today.  For depression and mood stabilization, I recommended a trial of sertraline 25mg daily. Potential adverse effects of sertraline were reviewed with the patient including but not limited to: severe rash, worsening depression, dizziness, somnolence, diarrhea, dry mouth, headache.  I did  that potential interaction with diltiazem may result in higher levels of sertraline and so we will plan to keep at low level. She was instructed to report any adverse symptoms. She agrees and conveys good understanding.    PLAN: 1. Continue Eliquis 5mg BID 2. Continue atorvastatin 20mg at bedtime. 3. Continue Plavix 75 mg daily for at least another 3 months 4. MRA head with [facility] scheduled in June 5. Repeat cerebral angiogram August 2024 6. Plan to possibly return to work on June 17, 2024 7. Start sertraline 25 mg daily for depression   Follow up in 2 months to schedule repeat cerebral angiogram.  All of the patient's questions and concerns were addressed.

## 2024-05-29 ENCOUNTER — TRANSCRIPTION ENCOUNTER (OUTPATIENT)
Age: 64
End: 2024-05-29

## 2024-05-29 ENCOUNTER — OUTPATIENT (OUTPATIENT)
Dept: OUTPATIENT SERVICES | Facility: HOSPITAL | Age: 64
LOS: 1 days | End: 2024-05-29
Payer: MEDICARE

## 2024-05-29 ENCOUNTER — RESULT REVIEW (OUTPATIENT)
Age: 64
End: 2024-05-29

## 2024-05-29 VITALS
RESPIRATION RATE: 16 BRPM | HEART RATE: 64 BPM | SYSTOLIC BLOOD PRESSURE: 143 MMHG | DIASTOLIC BLOOD PRESSURE: 85 MMHG | OXYGEN SATURATION: 100 %

## 2024-05-29 VITALS
HEART RATE: 72 BPM | WEIGHT: 227.08 LBS | DIASTOLIC BLOOD PRESSURE: 95 MMHG | RESPIRATION RATE: 16 BRPM | HEIGHT: 66 IN | OXYGEN SATURATION: 100 % | SYSTOLIC BLOOD PRESSURE: 108 MMHG

## 2024-05-29 DIAGNOSIS — Z98.890 OTHER SPECIFIED POSTPROCEDURAL STATES: Chronic | ICD-10-CM

## 2024-05-29 DIAGNOSIS — Z92.89 PERSONAL HISTORY OF OTHER MEDICAL TREATMENT: Chronic | ICD-10-CM

## 2024-05-29 DIAGNOSIS — I48.91 UNSPECIFIED ATRIAL FIBRILLATION: ICD-10-CM

## 2024-05-29 LAB
ANION GAP SERPL CALC-SCNC: 15 MMOL/L — SIGNIFICANT CHANGE UP (ref 5–17)
BUN SERPL-MCNC: 20.6 MG/DL — HIGH (ref 8–20)
CALCIUM SERPL-MCNC: 9.4 MG/DL — SIGNIFICANT CHANGE UP (ref 8.4–10.5)
CHLORIDE SERPL-SCNC: 99 MMOL/L — SIGNIFICANT CHANGE UP (ref 96–108)
CO2 SERPL-SCNC: 24 MMOL/L — SIGNIFICANT CHANGE UP (ref 22–29)
CREAT SERPL-MCNC: 0.81 MG/DL — SIGNIFICANT CHANGE UP (ref 0.5–1.3)
EGFR: 81 ML/MIN/1.73M2 — SIGNIFICANT CHANGE UP
GLUCOSE SERPL-MCNC: 98 MG/DL — SIGNIFICANT CHANGE UP (ref 70–99)
HCT VFR BLD CALC: 44.8 % — SIGNIFICANT CHANGE UP (ref 34.5–45)
HGB BLD-MCNC: 14.8 G/DL — SIGNIFICANT CHANGE UP (ref 11.5–15.5)
MAGNESIUM SERPL-MCNC: 2 MG/DL — SIGNIFICANT CHANGE UP (ref 1.8–2.6)
MCHC RBC-ENTMCNC: 29.1 PG — SIGNIFICANT CHANGE UP (ref 27–34)
MCHC RBC-ENTMCNC: 33 GM/DL — SIGNIFICANT CHANGE UP (ref 32–36)
MCV RBC AUTO: 88 FL — SIGNIFICANT CHANGE UP (ref 80–100)
PLATELET # BLD AUTO: 338 K/UL — SIGNIFICANT CHANGE UP (ref 150–400)
POTASSIUM SERPL-MCNC: 4 MMOL/L — SIGNIFICANT CHANGE UP (ref 3.5–5.3)
POTASSIUM SERPL-SCNC: 4 MMOL/L — SIGNIFICANT CHANGE UP (ref 3.5–5.3)
RBC # BLD: 5.09 M/UL — SIGNIFICANT CHANGE UP (ref 3.8–5.2)
RBC # FLD: 13.2 % — SIGNIFICANT CHANGE UP (ref 10.3–14.5)
SODIUM SERPL-SCNC: 138 MMOL/L — SIGNIFICANT CHANGE UP (ref 135–145)
WBC # BLD: 12.01 K/UL — HIGH (ref 3.8–10.5)
WBC # FLD AUTO: 12.01 K/UL — HIGH (ref 3.8–10.5)

## 2024-05-29 PROCEDURE — C8925: CPT

## 2024-05-29 PROCEDURE — 93005 ELECTROCARDIOGRAM TRACING: CPT

## 2024-05-29 PROCEDURE — 80048 BASIC METABOLIC PNL TOTAL CA: CPT

## 2024-05-29 PROCEDURE — 93010 ELECTROCARDIOGRAM REPORT: CPT | Mod: 76

## 2024-05-29 PROCEDURE — 93312 ECHO TRANSESOPHAGEAL: CPT | Mod: 26

## 2024-05-29 PROCEDURE — 93320 DOPPLER ECHO COMPLETE: CPT | Mod: 26

## 2024-05-29 PROCEDURE — 93320 DOPPLER ECHO COMPLETE: CPT

## 2024-05-29 PROCEDURE — 93325 DOPPLER ECHO COLOR FLOW MAPG: CPT | Mod: 26

## 2024-05-29 PROCEDURE — 36415 COLL VENOUS BLD VENIPUNCTURE: CPT

## 2024-05-29 PROCEDURE — 83735 ASSAY OF MAGNESIUM: CPT

## 2024-05-29 PROCEDURE — 93325 DOPPLER ECHO COLOR FLOW MAPG: CPT

## 2024-05-29 PROCEDURE — 92960 CARDIOVERSION ELECTRIC EXT: CPT

## 2024-05-29 PROCEDURE — 85027 COMPLETE CBC AUTOMATED: CPT

## 2024-05-29 RX ORDER — AMIODARONE HYDROCHLORIDE 400 MG/1
1 TABLET ORAL
Qty: 60 | Refills: 0
Start: 2024-05-29 | End: 2024-06-11

## 2024-05-29 RX ORDER — DILTIAZEM HCL 120 MG
1 CAPSULE, EXT RELEASE 24 HR ORAL
Refills: 0 | DISCHARGE

## 2024-05-29 RX ORDER — CLOPIDOGREL BISULFATE 75 MG/1
1 TABLET, FILM COATED ORAL
Refills: 0 | DISCHARGE

## 2024-05-29 NOTE — H&P PST ADULT - COMMENTS
Denies any fevers, chills, CP, palp, SOB, KAUR, dizziness, syncope, abdominal pain, N/V/D, hematuria, bloody and/or dark stools Denies any fevers, chills, CP, palp, SOB, KAUR, dizziness, syncope, abdominal pain, N/V/D, hematuria, bloody and/or dark stools    + fatigue

## 2024-05-29 NOTE — H&P PST ADULT - NSICDXPASTMEDICALHX_GEN_ALL_CORE_FT
PAST MEDICAL HISTORY:  Afib     Carpal tunnel syndrome     Cerebral aneurysm     CVA (cerebrovascular accident)     H/O cerebral embolism     HTN (hypertension)     Obesity     ADORE (obstructive sleep apnea)

## 2024-05-29 NOTE — DISCHARGE NOTE PROVIDER - NSDCCPTREATMENT_GEN_ALL_CORE_FT
PRINCIPAL PROCEDURE  Procedure: Echocardiography, transesophageal, with cardioversion  Findings and Treatment: -Take each dose of your anticoagulation medication (blood thinner) exactly as prescribed.   -Resume your diet with soft foods first.  - Do not drive, operate heavy machinery or make important decisions for 24 hours following the procedure.  - You may resume all other activities the day after the procedure.  Call your doctor if:   -you develop a fever, cough up blood, have any chest pain, palpitations or difficulty breathing. You may take a throat lozenge if you develop a sore throat or try warm salt water gargle.   - you have any questions or concerns regarding the procedure.  If you experience increased difficulty breathing or chest pain, or if you faint, have dizzy spells, or for any other distressing symptom, please seek immediate medical attention.

## 2024-05-29 NOTE — DISCHARGE NOTE PROVIDER - NSDCMRMEDTOKEN_GEN_ALL_CORE_FT
acetaminophen 325 mg oral tablet: 2 tab(s) orally every 6 hours As needed Mild Pain (1 - 3)  apixaban 5 mg oral tablet: 1 tab(s) orally every 12 hours  atorvastatin 40 mg oral tablet: 1 tab(s) orally once a day (at bedtime)  clopidogrel 75 mg oral tablet: 1 tab(s) orally once a day  DilTIAZem (Eqv-Cardizem CD) 240 mg/24 hours oral capsule, extended release: 1 cap(s) orally once a day  losartan 25 mg oral tablet: 1 tab(s) orally once a day  Metoprolol Tartrate 100 mg oral tablet: 1 tab(s) orally every 12 hours   acetaminophen 325 mg oral tablet: 2 tab(s) orally every 6 hours As needed Mild Pain (1 - 3)  amiodarone 400 mg oral tablet: 1 tab(s) orally 2 times a day 1 tablet twice daily x 14 days then decrease to 200mg (1/2 tablet) daily  apixaban 5 mg oral tablet: 1 tab(s) orally every 12 hours  atorvastatin 40 mg oral tablet: 1 tab(s) orally once a day (at bedtime)  clopidogrel 75 mg oral tablet: 1 tab(s) orally once a day  losartan 25 mg oral tablet: 1 tab(s) orally once a day  Metoprolol Tartrate 100 mg oral tablet: 1 tab(s) orally every 12 hours

## 2024-05-29 NOTE — H&P PST ADULT - HISTORY OF PRESENT ILLNESS
64 yo female with obesity (BMI 34), HTN, CVA (R-PCA), s/p TNK at [facility] and thrombectomy ([facility] 7/2023), ADORE, cerebral aneurysm s/p embolization (2/2024), and persistent AF (on Eliquis) s/p MIRANDA/DCCV 12/2023. She did well until 4/2024 when she was noted to be back in AF during a follow up appt. She does report some fatigue recently. She denies any CP, palpitations, SOB, KAUR, PND, edema, dizziness, or syncope. She is tolerating Eliquis well without any bleeding issues. She now presents electively for a MIRANDA guided cardioversion.     Cardiology summary:  TTE: 7/2023; LVEF 60-65%. Moderately enlarged LA. RA normal. Trivial pericardial effusion. Mild MR. Mild-mod TR.  NST: Normal perfusion imaging without ischemia  62 yo female with obesity (BMI 34), HTN, CVA (R-PCA), s/p TNK at [facility] and thrombectomy ([facility] 7/2023), ADORE, cerebral aneurysm s/p embolization (2/2024), and persistent AF (on Eliquis) s/p MIRANDA/DCCV 12/2023. She did well until 4/2024 when she was noted to be back in AF during a follow up appt. She does report some fatigue. She underwent a repeat MIRANDA/DCCV in 4/2024 but had early reoccurrence of AF. She is scheduled for an AF ablation in July with Dr. Wiggins. She denies any CP, palpitations, SOB, KAUR, PND, edema, dizziness, or syncope. She is tolerating Eliquis well without any bleeding issues. She now presents electively for a MIRANDA guided cardioversion.     Cardiology summary:  TTE: 7/2023; LVEF 60-65%. Moderately enlarged LA. RA normal. Trivial pericardial effusion. Mild MR. Mild-mod TR.  NST: Normal perfusion imaging without ischemia

## 2024-05-29 NOTE — H&P PST ADULT - ASSESSMENT
62 yo female with obesity (BMI 34), HTN, CVA (R-PCA), s/p TNK at [facility] and thrombectomy ([facility] 7/2023), ADORE, cerebral aneurysm s/p embolization (2/2024), and persistent AF (on Eliquis) s/p MIRANDA/DCCV 12/2023. She did well until 4/2024 when she was noted to be back in AF during a follow up appt. She does report some fatigue recently. She denies any CP, palpitations, SOB, KAUR, PND, edema, dizziness, or syncope. She is tolerating Eliquis well without any bleeding issues. She now presents electively for a MIRANDA guided cardioversion.     Plan:  Consent w/ Attending  Stat labs & ECG  NPO***  Last Eliquis dose *** 62 yo female with obesity (BMI 34), HTN, CVA (R-PCA), s/p TNK at [facility] and thrombectomy ([facility] 7/2023), ADORE, cerebral aneurysm s/p embolization (2/2024), and persistent AF (on Eliquis) s/p MRIANDA/DCCV 12/2023. She did well until 4/2024 when she was noted to be back in AF during a follow up appt. She does report some fatigue. She underwent a repeat MIRANDA/DCCV in 4/2024 but had early reoccurrence of AF. She is scheduled for an AF ablation in July with Dr. Wiggins. She denies any CP, palpitations, SOB, KAUR, PND, edema, dizziness, or syncope. She is tolerating Eliquis well without any bleeding issues. She now presents electively for a MIRANDA guided cardioversion.     Plan:  Consent w/ Attending  Stat labs & ECG  NPO confirmed  Last Eliquis dose this AM.

## 2024-05-29 NOTE — H&P PST ADULT - NSICDXPASTSURGICALHX_GEN_ALL_CORE_FT
PAST SURGICAL HISTORY:  H/O carpal tunnel repair     H/O coronary angiogram     History of cardioversion

## 2024-05-29 NOTE — DISCHARGE NOTE NURSING/CASE MANAGEMENT/SOCIAL WORK - PATIENT PORTAL LINK FT
You can access the FollowMyHealth Patient Portal offered by [facility] by registering at the following website: [email or web address]. By joining [facility]’s FollowMyHealth portal, you will also be able to view your health information using other applications (apps) compatible with our system.

## 2024-05-29 NOTE — PROGRESS NOTE ADULT - SUBJECTIVE AND OBJECTIVE BOX
Pt doing well s/p uncomplicated MIRANDA & DCCV (200J x 1) with restoration of sinus rhythm. Denies complaint.     EKG: SB at 54bpm w/ prolonged AV delay (WI 220ms). QRSD 88ms.   MIRANDA: Normal EF. No clot     Exam:   VSS, NAD, Awake and alert   Skin: no erythema/edema/blistering at defib pad sites.   Card: S1/S2, RRR, no m/g/r  Resp: lungs CTA b/l  Abd: S/NT/ND  Ext: no edema    Assessment:   62 yo female with obesity (BMI 34), HTN, CVA (R-PCA), s/p TNK at [facility] and thrombectomy ([facility] 7/2023), ADORE, cerebral aneurysm s/p embolization (2/2024), and persistent AF (on Eliquis) s/p MIRANDA/DCCV 12/2023. She did well until 4/2024 when she was noted to be back in AF during a follow up appt. She does report some fatigue. She underwent a repeat MIRANDA/DCCV in 4/2024 but had early reoccurrence of AF. She is scheduled for an AF ablation in July with Dr. Wiggins. She denies any CP, palpitations, SOB, KAUR, PND, edema, dizziness, or syncope. She is tolerating Eliquis well without any bleeding issues. She presented electively today for and is now status post MIRANDA negative for RUPAL thrombus and uncomplicated DCCV with restoration of sinus rhythm.     Plan:   Observation on telemetry per post op protocol.    NPO until 13:15  Ambulate w/ assist once fully awake & back to baseline mental status w/ VSS.  Continue Eliquis. Importance of strict compliance with anticoagulation regimen reinforced with pt.   Discontinue Cardizem.  Continue Metoprolol 100mg twice daily.  Start Amiodarone 400mg twice daily x 14 days then decrease to 400mg once daily. TFTs & LFTs should be monitor q 3-6 months while on amiodarone therapy. Anticipate <1 year of amiodarone therapy for this patient; however if > 1 year, pt will need annual fundoscopic exam and PFTs.  Anticipate d/c home once all criteria met, with outpt f/up in 1 month.

## 2024-05-29 NOTE — DISCHARGE NOTE NURSING/CASE MANAGEMENT/SOCIAL WORK - NSDCFUADDAPPT_GEN_ALL_CORE_FT
Our office will contact you in 3-5 days to schedule this appointment. Please call [phone] with questions or concerns.    STOP Cardizem  Start Amiodarone 400mg twice daily x 14 days then decrease to 200mg once daily.

## 2024-05-29 NOTE — DISCHARGE NOTE PROVIDER - CARE PROVIDERS DIRECT ADDRESSES
[email or web address]

## 2024-05-29 NOTE — DISCHARGE NOTE PROVIDER - HOSPITAL COURSE
64 yo female with obesity (BMI 34), HTN, CVA (R-PCA), s/p TNK at [facility] and thrombectomy ([facility] 7/2023), ADORE, cerebral aneurysm s/p embolization (2/2024), and persistent AF (on Eliquis) s/p MIRANDA/DCCV 12/2023. She did well until 4/2024 when she was noted to be back in AF during a follow up appt. She does report some fatigue. She underwent a repeat MIRANDA/DCCV in 4/2024 but had early reoccurrence of AF. She is scheduled for an AF ablation in July with Dr. Wiggins. She denies any CP, palpitations, SOB, KAUR, PND, edema, dizziness, or syncope. She is tolerating Eliquis well without any bleeding issues. She presented electively today for and is now status post MIRANDA negative for RUPAL thrombus and uncomplicated DCCV with restoration of sinus rhythm.     Plan:   Observation on telemetry per post op protocol.    NPO until 13:15  Ambulate w/ assist once fully awake & back to baseline mental status w/ VSS.  Continue Eliquis. Importance of strict compliance with anticoagulation regimen reinforced with pt.   Discontinue Cardizem.  Continue Metoprolol 100mg twice daily.  Start Amiodarone 400mg twice daily x 14 days then decrease to 400mg once daily. TFTs & LFTs should be monitor q 3-6 months while on amiodarone therapy. Anticipate <1 year of amiodarone therapy for this patient; however if > 1 year, pt will need annual fundoscopic exam and PFTs.  Anticipate d/c home once all criteria met, with outpt f/up in 1 month.

## 2024-05-29 NOTE — DISCHARGE NOTE PROVIDER - NSDCFUADDAPPT_GEN_ALL_CORE_FT
Our office will contact you in 3-5 days to schedule this appointment. Please call [phone] with questions or concerns. Our office will contact you in 3-5 days to schedule this appointment. Please call [phone] with questions or concerns.    STOP Cardizem  Start Amiodarone 400mg twice daily x 14 days then decrease to 200mg once daily.

## 2024-05-29 NOTE — DISCHARGE NOTE PROVIDER - CARE PROVIDER_API CALL
Elliot Mishra  Cardiovascular Disease  [address], [address]  [place] [address]  Phone: [phone]  Fax: [phone]  Follow Up Time:     John Wiggins  Cardiac Electrophysiology  [address], [address]  [place] [address]  Phone: [phone]  Fax: [phone]  Follow Up Time:

## 2024-05-29 NOTE — DISCHARGE NOTE PROVIDER - NSDCFUSCHEDAPPT_GEN_ALL_CORE_FT
Doctor, Unknown  [facility] Preadmit  Scheduled Appointment: 06/04/2024    Ny Buchanan  [facility] Physician [facility]  FAMILYMED [address]  Scheduled Appointment: 06/05/2024    Doctor, Unknown  [facility] Preadmit  Scheduled Appointment: 06/11/2024    Ko Zhou  [facility] Physician [facility]  NEUROLOGY [address]  Scheduled Appointment: 07/23/2024    Elliot Mishra  [facility] Physician [facility]  CARDIOLOGY [address] [facility] [facility]  Scheduled Appointment: 08/06/2024

## 2024-06-04 ENCOUNTER — OUTPATIENT (OUTPATIENT)
Dept: OUTPATIENT SERVICES | Facility: HOSPITAL | Age: 64
LOS: 1 days | End: 2024-06-04
Payer: MEDICARE

## 2024-06-04 VITALS
WEIGHT: 230.38 LBS | DIASTOLIC BLOOD PRESSURE: 82 MMHG | SYSTOLIC BLOOD PRESSURE: 142 MMHG | HEIGHT: 66 IN | TEMPERATURE: 97 F | RESPIRATION RATE: 16 BRPM | HEART RATE: 70 BPM | OXYGEN SATURATION: 98 %

## 2024-06-04 DIAGNOSIS — Z98.890 OTHER SPECIFIED POSTPROCEDURAL STATES: Chronic | ICD-10-CM

## 2024-06-04 DIAGNOSIS — I67.1 CEREBRAL ANEURYSM, NONRUPTURED: ICD-10-CM

## 2024-06-04 DIAGNOSIS — Z29.9 ENCOUNTER FOR PROPHYLACTIC MEASURES, UNSPECIFIED: ICD-10-CM

## 2024-06-04 DIAGNOSIS — Z92.89 PERSONAL HISTORY OF OTHER MEDICAL TREATMENT: Chronic | ICD-10-CM

## 2024-06-04 DIAGNOSIS — Z98.891 HISTORY OF UTERINE SCAR FROM PREVIOUS SURGERY: Chronic | ICD-10-CM

## 2024-06-04 DIAGNOSIS — G47.33 OBSTRUCTIVE SLEEP APNEA (ADULT) (PEDIATRIC): ICD-10-CM

## 2024-06-04 DIAGNOSIS — I48.91 UNSPECIFIED ATRIAL FIBRILLATION: ICD-10-CM

## 2024-06-04 DIAGNOSIS — I63.9 CEREBRAL INFARCTION, UNSPECIFIED: ICD-10-CM

## 2024-06-04 DIAGNOSIS — I10 ESSENTIAL (PRIMARY) HYPERTENSION: ICD-10-CM

## 2024-06-04 DIAGNOSIS — Z01.818 ENCOUNTER FOR OTHER PREPROCEDURAL EXAMINATION: ICD-10-CM

## 2024-06-04 LAB
A1C WITH ESTIMATED AVERAGE GLUCOSE RESULT: 5.3 % — SIGNIFICANT CHANGE UP (ref 4–5.6)
ANION GAP SERPL CALC-SCNC: 8 MMOL/L — SIGNIFICANT CHANGE UP (ref 5–17)
APTT BLD: 32.6 SEC — SIGNIFICANT CHANGE UP (ref 24.5–35.6)
BASOPHILS # BLD AUTO: 0.03 K/UL — SIGNIFICANT CHANGE UP (ref 0–0.2)
BASOPHILS NFR BLD AUTO: 0.3 % — SIGNIFICANT CHANGE UP (ref 0–2)
BUN SERPL-MCNC: 18.1 MG/DL — SIGNIFICANT CHANGE UP (ref 8–20)
CALCIUM SERPL-MCNC: 9.2 MG/DL — SIGNIFICANT CHANGE UP (ref 8.4–10.5)
CHLORIDE SERPL-SCNC: 103 MMOL/L — SIGNIFICANT CHANGE UP (ref 96–108)
CO2 SERPL-SCNC: 27 MMOL/L — SIGNIFICANT CHANGE UP (ref 22–29)
CREAT SERPL-MCNC: 0.72 MG/DL — SIGNIFICANT CHANGE UP (ref 0.5–1.3)
EGFR: 93 ML/MIN/1.73M2 — SIGNIFICANT CHANGE UP
EOSINOPHIL # BLD AUTO: 0.15 K/UL — SIGNIFICANT CHANGE UP (ref 0–0.5)
EOSINOPHIL NFR BLD AUTO: 1.6 % — SIGNIFICANT CHANGE UP (ref 0–6)
ESTIMATED AVERAGE GLUCOSE: 105 MG/DL — SIGNIFICANT CHANGE UP (ref 68–114)
GLUCOSE SERPL-MCNC: 99 MG/DL — SIGNIFICANT CHANGE UP (ref 70–99)
HCT VFR BLD CALC: 42.4 % — SIGNIFICANT CHANGE UP (ref 34.5–45)
HGB BLD-MCNC: 14.2 G/DL — SIGNIFICANT CHANGE UP (ref 11.5–15.5)
IMM GRANULOCYTES NFR BLD AUTO: 0.1 % — SIGNIFICANT CHANGE UP (ref 0–0.9)
INR BLD: 1.62 RATIO — HIGH (ref 0.85–1.18)
LYMPHOCYTES # BLD AUTO: 2.29 K/UL — SIGNIFICANT CHANGE UP (ref 1–3.3)
LYMPHOCYTES # BLD AUTO: 24.8 % — SIGNIFICANT CHANGE UP (ref 13–44)
MCHC RBC-ENTMCNC: 29.4 PG — SIGNIFICANT CHANGE UP (ref 27–34)
MCHC RBC-ENTMCNC: 33.5 GM/DL — SIGNIFICANT CHANGE UP (ref 32–36)
MCV RBC AUTO: 87.8 FL — SIGNIFICANT CHANGE UP (ref 80–100)
MONOCYTES # BLD AUTO: 0.72 K/UL — SIGNIFICANT CHANGE UP (ref 0–0.9)
MONOCYTES NFR BLD AUTO: 7.8 % — SIGNIFICANT CHANGE UP (ref 2–14)
NEUTROPHILS # BLD AUTO: 6.04 K/UL — SIGNIFICANT CHANGE UP (ref 1.8–7.4)
NEUTROPHILS NFR BLD AUTO: 65.4 % — SIGNIFICANT CHANGE UP (ref 43–77)
PLATELET # BLD AUTO: 336 K/UL — SIGNIFICANT CHANGE UP (ref 150–400)
POTASSIUM SERPL-MCNC: 4.9 MMOL/L — SIGNIFICANT CHANGE UP (ref 3.5–5.3)
POTASSIUM SERPL-SCNC: 4.9 MMOL/L — SIGNIFICANT CHANGE UP (ref 3.5–5.3)
PROTHROM AB SERPL-ACNC: 17.7 SEC — HIGH (ref 9.5–13)
RBC # BLD: 4.83 M/UL — SIGNIFICANT CHANGE UP (ref 3.8–5.2)
RBC # FLD: 13.2 % — SIGNIFICANT CHANGE UP (ref 10.3–14.5)
SODIUM SERPL-SCNC: 138 MMOL/L — SIGNIFICANT CHANGE UP (ref 135–145)
WBC # BLD: 9.24 K/UL — SIGNIFICANT CHANGE UP (ref 3.8–10.5)
WBC # FLD AUTO: 9.24 K/UL — SIGNIFICANT CHANGE UP (ref 3.8–10.5)

## 2024-06-04 PROCEDURE — 83036 HEMOGLOBIN GLYCOSYLATED A1C: CPT

## 2024-06-04 PROCEDURE — 93005 ELECTROCARDIOGRAM TRACING: CPT

## 2024-06-04 PROCEDURE — 80048 BASIC METABOLIC PNL TOTAL CA: CPT

## 2024-06-04 PROCEDURE — 85730 THROMBOPLASTIN TIME PARTIAL: CPT

## 2024-06-04 PROCEDURE — 93010 ELECTROCARDIOGRAM REPORT: CPT

## 2024-06-04 PROCEDURE — 85025 COMPLETE CBC W/AUTO DIFF WBC: CPT

## 2024-06-04 PROCEDURE — 36415 COLL VENOUS BLD VENIPUNCTURE: CPT

## 2024-06-04 PROCEDURE — G0463: CPT

## 2024-06-04 PROCEDURE — 85610 PROTHROMBIN TIME: CPT

## 2024-06-04 RX ORDER — CLOPIDOGREL BISULFATE 75 MG/1
1 TABLET, FILM COATED ORAL
Refills: 0 | DISCHARGE

## 2024-06-04 RX ORDER — LOSARTAN POTASSIUM 100 MG/1
1 TABLET, FILM COATED ORAL
Refills: 0 | DISCHARGE

## 2024-06-04 RX ORDER — DILTIAZEM HCL 120 MG
1 CAPSULE, EXT RELEASE 24 HR ORAL
Refills: 0 | DISCHARGE

## 2024-06-04 NOTE — H&P PST ADULT - NSICDXPASTSURGICALHX_GEN_ALL_CORE_FT
PAST SURGICAL HISTORY:  H/O carpal tunnel repair     H/O coronary angiogram     History of cardioversion     History of cerebral angiography     History of  section

## 2024-06-04 NOTE — H&P PST ADULT - ASSESSMENT
63-year-old woman with history of HTN, ADORE,CVA (2023- right posterior cerebral artery occlusion status post intravenous thrombolysis with tenecteplase and successful TICI3 revascularization with mechanical thrombectomy,) newly diagnosed atrial fibrillation On Eliquis, and an incidental, unruptured 5 mm anterior communicating artery aneurysm now almost 4 months s/p successful aneurysm embolization with flow diversion and coiling, here for PST for a Follow up MRA head NC with [facility] and anesthesia on 24. Medical Clearance pending, will obtain the last cardiac note from all script     medications reviewed, instructions given on what medications to take and what not to take.   Asked the patient to take the Blood pressure medication/ heart medication or any other important meds with a sip of water in the AM of surgery (Metoprolol, Diltiazem, Eliquis, Losartan, )  All other meds can be continued till the night before surgery.  she can continue with Plavix and Eliquis    CAPRINI VTE 2.0 SCORE [CLOT updated 2019]    AGE RELATED RISK FACTORS                                                       MOBILITY RELATED FACTORS  [ ] Age 41-60 years                                            (1 Point)                    [ ] Bed rest                                                        (1 Point)  [x ] Age: 61-74 years                                           (2 Points)                  [ ] Plaster cast                                                   (2 Points)  [ ] Age= 75 years                                              (3 Points)                    [ ] Bed bound for more than 72 hours                 (2 Points)    DISEASE RELATED RISK FACTORS                                               GENDER SPECIFIC FACTORS  [x ] Edema in the lower extremities                       (1 Point)              [ ] Pregnancy                                                     (1 Point)  [ ] Varicose veins                                               (1 Point)                     [ ] Post-partum < 6 weeks                                   (1 Point)             [x ] BMI > 25 Kg/m2                                            (1 Point)                     [ ] Hormonal therapy  or oral contraception          (1 Point)                 [ ] Sepsis (in the previous month)                        (1 Point)               [ ] History of pregnancy complications                 (1 point)  [ ] Pneumonia or serious lung disease                                               [ ] Unexplained or recurrent                     (1 Point)           (in the previous month)                               (1 Point)  [ ] Abnormal pulmonary function test                     (1 Point)                 SURGERY RELATED RISK FACTORS  [ ] Acute myocardial infarction                              (1 Point)               [ ]  Section                                             (1 Point)  [ ] Congestive heart failure (in the previous month)  (1 Point)      [ ] Minor surgery                                                  (1 Point)   [ ] Inflammatory bowel disease                             (1 Point)               [ ] Arthroscopic surgery                                        (2 Points)  [ ] Central venous access                                      (2 Points)                [ ] General surgery lasting more than 45 minutes (2 points)  [ ] Malignancy- Present or previous                   (2 Points)                [ ] Elective arthroplasty                                         (5 points)    [ ] Stroke (in the previous month)                          (5 Points)                                                                                                                                                           HEMATOLOGY RELATED FACTORS                                                 TRAUMA RELATED RISK FACTORS  [ ] Prior episodes of VTE                                     (3 Points)                [ ] Fracture of the hip, pelvis, or leg                       (5 Points)  [ ] Positive family history for VTE                         (3 Points)             [ ] Acute spinal cord injury (in the previous month)  (5 Points)  [ ] Prothrombin 91478 A                                     (3 Points)               [ ] Paralysis  (less than 1 month)                             (5 Points)  [ ] Factor V Leiden                                             (3 Points)                  [ ] Multiple Trauma within 1 month                        (5 Points)  [ ] Lupus anticoagulants                                     (3 Points)                                                           [ ] Anticardiolipin antibodies                               (3 Points)                                                       [ ] High homocysteine in the blood                      (3 Points)                                             [ ] Other congenital or acquired thrombophilia      (3 Points)                                                [ ] Heparin induced thrombocytopenia                  (3 Points)                                     Total Score [     4     ]  OPIOID RISK TOOL    ZACHARY EACH BOX THAT APPLIES AND ADD TOTALS AT THE END    FAMILY HISTORY OF SUBSTANCE ABUSE                 FEMALE         MALE                                                Alcohol                             [  ]1 pt          [  ]3pts                                               Illegal Drugs                     [  ]2 pts        [  ]3pts                                               Rx Drugs                           [  ]4 pts        [  ]4 pts    PERSONAL HISTORY OF SUBSTANCE ABUSE                                                                                          Alcohol                             [  ]3 pts       [  ]3 pts                                               Illegal Drugs                     [  ]4 pts        [  ]4 pts                                               Rx Drugs                           [  ]5 pts        [  ]5 pts    AGE BETWEEN 16-45 YEARS                                      [  ]1 pt         [  ]1 pt    HISTORY OF PREADOLESCENT   SEXUAL ABUSE                                                             [  ]3 pts        [  ]0pts    PSYCHOLOGICAL DISEASE                     ADD, OCD, Bipolar, Schizophrenia        [  ]2 pts         [  ]2 pts                      Depression                                               [  ]1 pt           [  ]1 pt           SCORING TOTAL   (add numbers and type here)              ( 0)                                     A score of 3 or lower indicated LOW risk for future opioid abuse  A score of 4 to 7 indicated moderate risk for future opioid abuse  A score of 8 or higher indicates a high risk for opioid abuse

## 2024-06-04 NOTE — H&P PST ADULT - NSICDXPASTMEDICALHX_GEN_ALL_CORE_FT
PAST MEDICAL HISTORY:  Afib     Cerebral aneurysm     CVA (cerebrovascular accident)     H/O cerebral embolism     HTN (hypertension)     Obesity     ADORE (obstructive sleep apnea)

## 2024-06-04 NOTE — H&P PST ADULT - HISTORY OF PRESENT ILLNESS
63-year-old woman with history of HTN, ADORE,CVA (7/1/2023- right posterior cerebral artery occlusion status post intravenous thrombolysis with tenecteplase and successful TICI3 revascularization with mechanical thrombectomy,) newly diagnosed atrial fibrillation On Eliquis, and an incidental, unruptured 5 mm anterior communicating artery aneurysm now almost 4 months s/p successful aneurysm embolization with flow diversion and coiling, here for PST for a Follow up MRA head NC with [facility] and anesthesia on 6/11/24. Medical Clearance pending, will obtain the last cardiac note from all script.  63-year-old woman with history of HTN, ADORE, CVA (7/1/2023- right posterior cerebral artery occlusion status post intravenous thrombolysis with tenecteplase and successful TICI3 revascularization with mechanical thrombectomy,) newly diagnosed atrial fibrillation On Eliquis, and an incidental, unruptured 5 mm anterior communicating artery aneurysm now  s/p successful aneurysm embolization with flow diversion and coiling, here for PST for a Follow up MRA head NC with [facility] and anesthesia on 6/11/24. Medical Clearance pending, will obtain the last cardiac note from all script.

## 2024-06-04 NOTE — H&P PST ADULT - OTHER CARE PROVIDERS
will get the last cardiac note from dr. Mishra will get the last cardiac note from dr. Mishra ([facility])

## 2024-06-04 NOTE — H&P PST ADULT - NSANTHOSAYNRD_GEN_A_CORE
No. ADORE screening performed.  STOP BANG Legend: 0-2 = LOW Risk; 3-4 = INTERMEDIATE Risk; 5-8 = HIGH Risk

## 2024-06-04 NOTE — H&P PST ADULT - PROBLEM SELECTOR PLAN 5
MRA head NC with [facility] and anesthesia on 6/11/24. Medical Clearance pending, will obtain the last cardiac note from all script

## 2024-06-05 ENCOUNTER — APPOINTMENT (OUTPATIENT)
Dept: FAMILY MEDICINE | Facility: CLINIC | Age: 64
End: 2024-06-05
Payer: MEDICARE

## 2024-06-05 VITALS
RESPIRATION RATE: 14 BRPM | WEIGHT: 230 LBS | BODY MASS INDEX: 36.96 KG/M2 | OXYGEN SATURATION: 98 % | DIASTOLIC BLOOD PRESSURE: 80 MMHG | TEMPERATURE: 97.8 F | HEART RATE: 88 BPM | SYSTOLIC BLOOD PRESSURE: 120 MMHG | HEIGHT: 66 IN

## 2024-06-05 DIAGNOSIS — I67.1 CEREBRAL ANEURYSM, NONRUPTURED: ICD-10-CM

## 2024-06-05 DIAGNOSIS — I63.9 CEREBRAL INFARCTION, UNSPECIFIED: ICD-10-CM

## 2024-06-05 DIAGNOSIS — Z01.818 ENCOUNTER FOR OTHER PREPROCEDURAL EXAMINATION: ICD-10-CM

## 2024-06-05 PROBLEM — G47.33 OBSTRUCTIVE SLEEP APNEA (ADULT) (PEDIATRIC): Chronic | Status: ACTIVE | Noted: 2024-05-29

## 2024-06-05 PROBLEM — Z86.79 PERSONAL HISTORY OF OTHER DISEASES OF THE CIRCULATORY SYSTEM: Chronic | Status: ACTIVE | Noted: 2024-05-29

## 2024-06-05 PROBLEM — E66.9 OBESITY, UNSPECIFIED: Chronic | Status: ACTIVE | Noted: 2024-05-29

## 2024-06-05 PROCEDURE — G2211 COMPLEX E/M VISIT ADD ON: CPT

## 2024-06-05 PROCEDURE — 99213 OFFICE O/P EST LOW 20 MIN: CPT

## 2024-06-05 RX ORDER — SERTRALINE 25 MG/1
25 TABLET, FILM COATED ORAL DAILY
Qty: 90 | Refills: 1 | Status: DISCONTINUED | COMMUNITY
Start: 2024-05-28 | End: 2024-06-05

## 2024-06-05 RX ORDER — DILTIAZEM HYDROCHLORIDE 360 MG/1
360 TABLET, EXTENDED RELEASE ORAL DAILY
Qty: 90 | Refills: 1 | Status: DISCONTINUED | COMMUNITY
Start: 2023-07-13 | End: 2024-06-05

## 2024-06-05 NOTE — PHYSICAL EXAM
[No Acute Distress] : no acute distress [Well Nourished] : well nourished [Well Developed] : well developed [Well-Appearing] : well-appearing [Normal Voice/Communication] : normal voice/communication [Normal Sclera/Conjunctiva] : normal sclera/conjunctiva [Normal Outer Ear/Nose] : the outer ears and nose were normal in appearance [Normal Oropharynx] : the oropharynx was normal [Normal TMs] : both tympanic membranes were normal [No Lymphadenopathy] : no lymphadenopathy [Supple] : supple [No Respiratory Distress] : no respiratory distress  [No Accessory Muscle Use] : no accessory muscle use [Clear to Auscultation] : lungs were clear to auscultation bilaterally [No Edema] : there was no peripheral edema [Non-distended] : non-distended [Normal Mood] : the mood was normal [de-identified] : irregularly irregular

## 2024-06-05 NOTE — HISTORY OF PRESENT ILLNESS
[Atrial Fibrillation] : atrial fibrillation [Sleep Apnea] : sleep apnea [No Adverse Anesthesia Reaction] : no adverse anesthesia reaction in self or family member [Chronic Anticoagulation] : chronic anticoagulation [Anticoagulants: _____] : Anticoagulants: [unfilled] [Aortic Stenosis] : no aortic stenosis [Coronary Artery Disease] : no coronary artery disease [Recent Myocardial Infarction] : no recent myocardial infarction [Implantable Device/Pacemaker] : no implantable device/pacemaker [Asthma] : no asthma [COPD] : no COPD [Smoker] : not a smoker [Family Member] : no family member with adverse anesthesia reaction/sudden death [Self] : no previous adverse anesthesia reaction [Chronic Kidney Disease] : no chronic kidney disease [Diabetes] : no diabetes [(Patient denies any chest pain, claudication, dyspnea on exertion, orthopnea, palpitations or syncope)] : Patient denies any chest pain, claudication, dyspnea on exertion, orthopnea, palpitations or syncope [FreeTextEntry1] : MRR head with [facility] [FreeTextEntry2] : 6/11/2024 [FreeTextEntry3] : Dr Ko Quiñones

## 2024-06-05 NOTE — ASSESSMENT
[Patient Optimized for Surgery] : Patient optimized for surgery [No Further Testing Recommended] : no further testing recommended [Continue medications as is] : Continue current medications [As per surgery] : as per surgery [FreeTextEntry4] : She is medically cleared for MR with sedation

## 2024-06-06 RX ORDER — APIXABAN 5 MG/1
5 TABLET, FILM COATED ORAL
Qty: 180 | Refills: 1 | Status: ACTIVE | COMMUNITY
Start: 2023-07-13 | End: 1900-01-01

## 2024-06-06 RX ORDER — AMIODARONE HYDROCHLORIDE 200 MG/1
200 TABLET ORAL DAILY
Qty: 90 | Refills: 1 | Status: ACTIVE | COMMUNITY
Start: 1900-01-01 | End: 1900-01-01

## 2024-06-06 RX ORDER — CLOPIDOGREL BISULFATE 75 MG/1
75 TABLET, FILM COATED ORAL
Qty: 90 | Refills: 1 | Status: ACTIVE | COMMUNITY
Start: 2024-01-19 | End: 1900-01-01

## 2024-06-06 RX ORDER — ATORVASTATIN CALCIUM 20 MG/1
20 TABLET, FILM COATED ORAL
Qty: 90 | Refills: 1 | Status: ACTIVE | COMMUNITY
Start: 1900-01-01 | End: 1900-01-01

## 2024-06-06 RX ORDER — METOPROLOL TARTRATE 100 MG/1
100 TABLET, FILM COATED ORAL
Qty: 180 | Refills: 1 | Status: ACTIVE | COMMUNITY
Start: 2023-07-13 | End: 1900-01-01

## 2024-06-06 RX ORDER — LOSARTAN POTASSIUM 25 MG/1
25 TABLET, FILM COATED ORAL DAILY
Qty: 90 | Refills: 1 | Status: ACTIVE | COMMUNITY
Start: 2024-02-22 | End: 1900-01-01

## 2024-06-11 ENCOUNTER — OUTPATIENT (OUTPATIENT)
Dept: OUTPATIENT SERVICES | Facility: HOSPITAL | Age: 64
LOS: 1 days | End: 2024-06-11
Payer: MEDICARE

## 2024-06-11 DIAGNOSIS — Z98.890 OTHER SPECIFIED POSTPROCEDURAL STATES: Chronic | ICD-10-CM

## 2024-06-11 DIAGNOSIS — Z98.891 HISTORY OF UTERINE SCAR FROM PREVIOUS SURGERY: Chronic | ICD-10-CM

## 2024-06-11 DIAGNOSIS — Z92.89 PERSONAL HISTORY OF OTHER MEDICAL TREATMENT: Chronic | ICD-10-CM

## 2024-06-11 DIAGNOSIS — I67.1 CEREBRAL ANEURYSM, NONRUPTURED: ICD-10-CM

## 2024-06-11 PROCEDURE — 70544 MR ANGIOGRAPHY HEAD W/O DYE: CPT | Mod: 26,MH

## 2024-06-11 PROCEDURE — 70544 MR ANGIOGRAPHY HEAD W/O DYE: CPT

## 2024-06-18 ENCOUNTER — NON-APPOINTMENT (OUTPATIENT)
Age: 64
End: 2024-06-18

## 2024-06-19 ENCOUNTER — NON-APPOINTMENT (OUTPATIENT)
Age: 64
End: 2024-06-19

## 2024-06-26 ENCOUNTER — OUTPATIENT (OUTPATIENT)
Dept: OUTPATIENT SERVICES | Facility: HOSPITAL | Age: 64
LOS: 1 days | End: 2024-06-26
Payer: MEDICARE

## 2024-06-26 VITALS
WEIGHT: 229.28 LBS | OXYGEN SATURATION: 99 % | HEART RATE: 90 BPM | RESPIRATION RATE: 18 BRPM | TEMPERATURE: 97 F | DIASTOLIC BLOOD PRESSURE: 80 MMHG | SYSTOLIC BLOOD PRESSURE: 150 MMHG | HEIGHT: 67 IN

## 2024-06-26 DIAGNOSIS — Z98.891 HISTORY OF UTERINE SCAR FROM PREVIOUS SURGERY: Chronic | ICD-10-CM

## 2024-06-26 DIAGNOSIS — Z98.890 OTHER SPECIFIED POSTPROCEDURAL STATES: Chronic | ICD-10-CM

## 2024-06-26 DIAGNOSIS — Z01.818 ENCOUNTER FOR OTHER PREPROCEDURAL EXAMINATION: ICD-10-CM

## 2024-06-26 DIAGNOSIS — Z92.89 PERSONAL HISTORY OF OTHER MEDICAL TREATMENT: Chronic | ICD-10-CM

## 2024-06-26 LAB
ANION GAP SERPL CALC-SCNC: 15 MMOL/L — SIGNIFICANT CHANGE UP (ref 5–17)
APTT BLD: 35 SEC — SIGNIFICANT CHANGE UP (ref 24.5–35.6)
BLD GP AB SCN SERPL QL: SIGNIFICANT CHANGE UP
BUN SERPL-MCNC: 16.6 MG/DL — SIGNIFICANT CHANGE UP (ref 8–20)
CALCIUM SERPL-MCNC: 9.3 MG/DL — SIGNIFICANT CHANGE UP (ref 8.4–10.5)
CHLORIDE SERPL-SCNC: 101 MMOL/L — SIGNIFICANT CHANGE UP (ref 96–108)
CO2 SERPL-SCNC: 23 MMOL/L — SIGNIFICANT CHANGE UP (ref 22–29)
CREAT SERPL-MCNC: 0.78 MG/DL — SIGNIFICANT CHANGE UP (ref 0.5–1.3)
EGFR: 84 ML/MIN/1.73M2 — SIGNIFICANT CHANGE UP
GLUCOSE SERPL-MCNC: 82 MG/DL — SIGNIFICANT CHANGE UP (ref 70–99)
HCT VFR BLD CALC: 42.1 % — SIGNIFICANT CHANGE UP (ref 34.5–45)
HGB BLD-MCNC: 14.2 G/DL — SIGNIFICANT CHANGE UP (ref 11.5–15.5)
INR BLD: 1.5 RATIO — HIGH (ref 0.85–1.18)
MAGNESIUM SERPL-MCNC: 1.8 MG/DL — SIGNIFICANT CHANGE UP (ref 1.6–2.6)
MCHC RBC-ENTMCNC: 28.9 PG — SIGNIFICANT CHANGE UP (ref 27–34)
MCHC RBC-ENTMCNC: 33.7 GM/DL — SIGNIFICANT CHANGE UP (ref 32–36)
MCV RBC AUTO: 85.7 FL — SIGNIFICANT CHANGE UP (ref 80–100)
PLATELET # BLD AUTO: 282 K/UL — SIGNIFICANT CHANGE UP (ref 150–400)
POTASSIUM SERPL-MCNC: 3.8 MMOL/L — SIGNIFICANT CHANGE UP (ref 3.5–5.3)
POTASSIUM SERPL-SCNC: 3.8 MMOL/L — SIGNIFICANT CHANGE UP (ref 3.5–5.3)
PROTHROM AB SERPL-ACNC: 16.5 SEC — HIGH (ref 9.5–13)
RBC # BLD: 4.91 M/UL — SIGNIFICANT CHANGE UP (ref 3.8–5.2)
RBC # FLD: 13.3 % — SIGNIFICANT CHANGE UP (ref 10.3–14.5)
SODIUM SERPL-SCNC: 139 MMOL/L — SIGNIFICANT CHANGE UP (ref 135–145)
WBC # BLD: 7.95 K/UL — SIGNIFICANT CHANGE UP (ref 3.8–10.5)
WBC # FLD AUTO: 7.95 K/UL — SIGNIFICANT CHANGE UP (ref 3.8–10.5)

## 2024-06-26 PROCEDURE — 93010 ELECTROCARDIOGRAM REPORT: CPT

## 2024-06-26 PROCEDURE — 36415 COLL VENOUS BLD VENIPUNCTURE: CPT

## 2024-06-26 PROCEDURE — 85610 PROTHROMBIN TIME: CPT

## 2024-06-26 PROCEDURE — 85730 THROMBOPLASTIN TIME PARTIAL: CPT

## 2024-06-26 PROCEDURE — G0463: CPT

## 2024-06-26 PROCEDURE — 86900 BLOOD TYPING SEROLOGIC ABO: CPT

## 2024-06-26 PROCEDURE — 80048 BASIC METABOLIC PNL TOTAL CA: CPT

## 2024-06-26 PROCEDURE — 83735 ASSAY OF MAGNESIUM: CPT

## 2024-06-26 PROCEDURE — 85027 COMPLETE CBC AUTOMATED: CPT

## 2024-06-26 PROCEDURE — 86850 RBC ANTIBODY SCREEN: CPT

## 2024-06-26 PROCEDURE — 86901 BLOOD TYPING SEROLOGIC RH(D): CPT

## 2024-06-26 PROCEDURE — 93005 ELECTROCARDIOGRAM TRACING: CPT

## 2024-07-08 ENCOUNTER — NON-APPOINTMENT (OUTPATIENT)
Age: 64
End: 2024-07-08

## 2024-07-09 ENCOUNTER — OUTPATIENT (OUTPATIENT)
Dept: OUTPATIENT SERVICES | Facility: HOSPITAL | Age: 64
LOS: 1 days | End: 2024-07-09
Payer: MEDICARE

## 2024-07-09 ENCOUNTER — APPOINTMENT (OUTPATIENT)
Dept: CT IMAGING | Facility: CLINIC | Age: 64
End: 2024-07-09
Payer: MEDICARE

## 2024-07-09 ENCOUNTER — RESULT REVIEW (OUTPATIENT)
Age: 64
End: 2024-07-09

## 2024-07-09 DIAGNOSIS — Z98.890 OTHER SPECIFIED POSTPROCEDURAL STATES: Chronic | ICD-10-CM

## 2024-07-09 DIAGNOSIS — I48.19 OTHER PERSISTENT ATRIAL FIBRILLATION: ICD-10-CM

## 2024-07-09 PROCEDURE — 75572 CT HRT W/3D IMAGE: CPT | Mod: 26

## 2024-07-09 PROCEDURE — 75572 CT HRT W/3D IMAGE: CPT

## 2024-07-10 ENCOUNTER — RESULT REVIEW (OUTPATIENT)
Age: 64
End: 2024-07-10

## 2024-07-10 ENCOUNTER — TRANSCRIPTION ENCOUNTER (OUTPATIENT)
Age: 64
End: 2024-07-10

## 2024-07-10 ENCOUNTER — INPATIENT (INPATIENT)
Facility: HOSPITAL | Age: 64
LOS: 1 days | Discharge: HOME | DRG: 310 | End: 2024-07-12
Attending: STUDENT IN AN ORGANIZED HEALTH CARE EDUCATION/TRAINING PROGRAM | Admitting: STUDENT IN AN ORGANIZED HEALTH CARE EDUCATION/TRAINING PROGRAM
Payer: MEDICARE

## 2024-07-10 VITALS
HEIGHT: 67 IN | WEIGHT: 229.28 LBS | OXYGEN SATURATION: 97 % | HEART RATE: 113 BPM | SYSTOLIC BLOOD PRESSURE: 172 MMHG | DIASTOLIC BLOOD PRESSURE: 124 MMHG | TEMPERATURE: 98 F | RESPIRATION RATE: 17 BRPM

## 2024-07-10 DIAGNOSIS — Z98.890 OTHER SPECIFIED POSTPROCEDURAL STATES: Chronic | ICD-10-CM

## 2024-07-10 DIAGNOSIS — Z92.89 PERSONAL HISTORY OF OTHER MEDICAL TREATMENT: Chronic | ICD-10-CM

## 2024-07-10 DIAGNOSIS — I48.91 UNSPECIFIED ATRIAL FIBRILLATION: ICD-10-CM

## 2024-07-10 DIAGNOSIS — Z98.891 HISTORY OF UTERINE SCAR FROM PREVIOUS SURGERY: Chronic | ICD-10-CM

## 2024-07-10 PROCEDURE — 93010 ELECTROCARDIOGRAM REPORT: CPT

## 2024-07-10 PROCEDURE — 93656 COMPRE EP EVAL ABLTJ ATR FIB: CPT

## 2024-07-10 PROCEDURE — 93325 DOPPLER ECHO COLOR FLOW MAPG: CPT | Mod: 26

## 2024-07-10 PROCEDURE — 93622 COMP EP EVAL L VENTR PAC&REC: CPT | Mod: 26

## 2024-07-10 PROCEDURE — 76376 3D RENDER W/INTRP POSTPROCES: CPT | Mod: 26

## 2024-07-10 PROCEDURE — 93657 TX L/R ATRIAL FIB ADDL: CPT

## 2024-07-10 PROCEDURE — 93623 PRGRMD STIMJ&PACG IV RX NFS: CPT | Mod: 26

## 2024-07-10 PROCEDURE — 93312 ECHO TRANSESOPHAGEAL: CPT | Mod: 26

## 2024-07-10 PROCEDURE — 99152 MOD SED SAME PHYS/QHP 5/>YRS: CPT

## 2024-07-10 PROCEDURE — 93655 ICAR CATH ABLTJ DSCRT ARRHYT: CPT

## 2024-07-10 PROCEDURE — 93320 DOPPLER ECHO COMPLETE: CPT | Mod: 26

## 2024-07-10 RX ORDER — BENZOCAINE AND MENTHOL 15; 3.6 MG/1; MG/1
1 LOZENGE ORAL
Refills: 0 | Status: DISCONTINUED | OUTPATIENT
Start: 2024-07-10 | End: 2024-07-12

## 2024-07-10 RX ORDER — FUROSEMIDE 10 MG/ML
20 INJECTION, SOLUTION INTRAMUSCULAR; INTRAVENOUS ONCE
Refills: 0 | Status: COMPLETED | OUTPATIENT
Start: 2024-07-10 | End: 2024-07-10

## 2024-07-10 RX ORDER — PANTOPRAZOLE SODIUM 40 MG/10ML
40 INJECTION, POWDER, FOR SOLUTION INTRAVENOUS EVERY 12 HOURS
Refills: 0 | Status: DISCONTINUED | OUTPATIENT
Start: 2024-07-10 | End: 2024-07-12

## 2024-07-10 RX ORDER — APIXABAN 5 MG/1
5 TABLET, FILM COATED ORAL
Refills: 0 | Status: DISCONTINUED | OUTPATIENT
Start: 2024-07-10 | End: 2024-07-12

## 2024-07-10 RX ORDER — ALPRAZOLAM 2 MG/1
0.25 TABLET ORAL EVERY 8 HOURS
Refills: 0 | Status: DISCONTINUED | OUTPATIENT
Start: 2024-07-10 | End: 2024-07-12

## 2024-07-10 RX ORDER — ACETAMINOPHEN 325 MG
650 TABLET ORAL EVERY 6 HOURS
Refills: 0 | Status: DISCONTINUED | OUTPATIENT
Start: 2024-07-10 | End: 2024-07-12

## 2024-07-10 RX ORDER — ATORVASTATIN CALCIUM 20 MG/1
40 TABLET, FILM COATED ORAL AT BEDTIME
Refills: 0 | Status: DISCONTINUED | OUTPATIENT
Start: 2024-07-10 | End: 2024-07-12

## 2024-07-10 RX ORDER — MAGNESIUM, ALUMINUM HYDROXIDE 400-400
30 TABLET,CHEWABLE ORAL EVERY 4 HOURS
Refills: 0 | Status: DISCONTINUED | OUTPATIENT
Start: 2024-07-10 | End: 2024-07-12

## 2024-07-10 RX ORDER — ONDANSETRON HYDROCHLORIDE 2 MG/ML
4 INJECTION INTRAMUSCULAR; INTRAVENOUS EVERY 6 HOURS
Refills: 0 | Status: DISCONTINUED | OUTPATIENT
Start: 2024-07-10 | End: 2024-07-10

## 2024-07-10 RX ORDER — SUCRALFATE 1 G
1 TABLET ORAL EVERY 12 HOURS
Refills: 0 | Status: DISCONTINUED | OUTPATIENT
Start: 2024-07-10 | End: 2024-07-12

## 2024-07-10 RX ORDER — FUROSEMIDE 10 MG/ML
20 INJECTION, SOLUTION INTRAMUSCULAR; INTRAVENOUS DAILY
Refills: 0 | Status: DISCONTINUED | OUTPATIENT
Start: 2024-07-11 | End: 2024-07-11

## 2024-07-10 RX ORDER — LOSARTAN POTASSIUM 100 MG/1
25 TABLET, FILM COATED ORAL DAILY
Refills: 0 | Status: DISCONTINUED | OUTPATIENT
Start: 2024-07-10 | End: 2024-07-12

## 2024-07-10 RX ORDER — METOPROLOL TARTRATE 50 MG
100 TABLET ORAL DAILY
Refills: 0 | Status: DISCONTINUED | OUTPATIENT
Start: 2024-07-11 | End: 2024-07-11

## 2024-07-10 RX ORDER — HYDRALAZINE HYDROCHLORIDE 50 MG/1
5 TABLET ORAL ONCE
Refills: 0 | Status: COMPLETED | OUTPATIENT
Start: 2024-07-10 | End: 2024-07-10

## 2024-07-10 RX ORDER — CLOPIDOGREL BISULFATE 75 MG/1
75 TABLET, FILM COATED ORAL DAILY
Refills: 0 | Status: DISCONTINUED | OUTPATIENT
Start: 2024-07-10 | End: 2024-07-12

## 2024-07-10 RX ADMIN — APIXABAN 5 MILLIGRAM(S): 5 TABLET, FILM COATED ORAL at 22:02

## 2024-07-10 RX ADMIN — Medication 1 GRAM(S): at 22:03

## 2024-07-10 RX ADMIN — HYDRALAZINE HYDROCHLORIDE 5 MILLIGRAM(S): 50 TABLET ORAL at 10:59

## 2024-07-10 RX ADMIN — FUROSEMIDE 20 MILLIGRAM(S): 10 INJECTION, SOLUTION INTRAMUSCULAR; INTRAVENOUS at 22:03

## 2024-07-10 RX ADMIN — ATORVASTATIN CALCIUM 40 MILLIGRAM(S): 20 TABLET, FILM COATED ORAL at 22:02

## 2024-07-10 RX ADMIN — PANTOPRAZOLE SODIUM 40 MILLIGRAM(S): 40 INJECTION, POWDER, FOR SOLUTION INTRAVENOUS at 22:02

## 2024-07-10 NOTE — DISCHARGE NOTE PROVIDER - HOSPITAL COURSE
63 y/o female with obesity (BMI 34), HTN, CVA (R-PCA), s/p TNK at [facility] and thrombectomy ([facility] 7/2023), ADORE, cerebral aneurysm s/p embolization (2/2024), and persistent AF (on Eliquis) s/p multiple MIRANDA/DCCV (most recently 5/29/24), who presented 7/10/24 for and is now s/p elective radiofrequency atrial fibrillation ablation (WACA/PVI, CTI, posterior wall, and anterior wall homogenization), access obtained via R sided femoral vein, hemostasis achieved with R groin vascade. Pt denied any complaints post procedure. 65 y/o female with obesity (BMI 34), HTN, CVA (R-PCA), s/p TNK at [facility] and thrombectomy ([facility] 7/2023), ADORE, cerebral aneurysm s/p embolization (2/2024), and persistent AF (on Eliquis) s/p multiple MIRANDA/DCCV (most recently 5/29/24), who presented 7/10/24 for and is now s/p elective radiofrequency atrial fibrillation ablation (WACA/PVI, CTI, posterior wall, and anterior wall homogenization), access obtained via R sided femoral vein, hemostasis achieved with R groin vascade. Pt denied any complaints post procedure. Procedure went well and without immediate complications but noted to have developed ZIGGY later on for which EP service requested admission for. Pt was given IV fluid hydration and BMOP closely monitored. Gradually renal function improved. Pt maintained good urine output as well. Given overall improvement pt is medically optimized for discharge home today.

## 2024-07-10 NOTE — DISCHARGE NOTE PROVIDER - NSDCMRMEDTOKEN_GEN_ALL_CORE_FT
apixaban 5 mg oral tablet: 1 tab(s) orally every 12 hours  atorvastatin 40 mg oral tablet: 1 tab(s) orally once a day (at bedtime)  clopidogrel 75 mg oral tablet: 1 tab(s) orally once a day  dilTIAZem 240 mg/24 hours oral capsule, extended release: 1 cap(s) orally once a day  losartan 25 mg oral tablet: 1 tab(s) orally once a day  Metoprolol Tartrate 100 mg oral tablet: 1 tab(s) orally every 12 hours   amiodarone 200 mg oral tablet: 1 tab(s) orally once a day  apixaban 5 mg oral tablet: 1 tab(s) orally every 12 hours  atorvastatin 40 mg oral tablet: 1 tab(s) orally once a day (at bedtime)  clopidogrel 75 mg oral tablet: 1 tab(s) orally once a day  furosemide 20 mg oral tablet: 1 tab(s) orally once a day  losartan 25 mg oral tablet: 1 tab(s) orally once a day  metoprolol succinate 25 mg oral tablet, extended release: 1 tab(s) orally once a day  pantoprazole 40 mg oral delayed release tablet: 1 tab(s) orally every 12 hours x 14 days, then decrease dose to one tablet daily  sucralfate 1 g/10 mL oral suspension: 10 milliliter(s) orally every 12 hours   amiodarone 200 mg oral tablet: 1 tab(s) orally once a day  apixaban 5 mg oral tablet: 1 tab(s) orally every 12 hours  atorvastatin 40 mg oral tablet: 1 tab(s) orally once a day (at bedtime)  clopidogrel 75 mg oral tablet: 1 tab(s) orally once a day  losartan 25 mg oral tablet: 1 tab(s) orally once a day  metoprolol succinate 25 mg oral tablet, extended release: 1 tab(s) orally once a day  pantoprazole 40 mg oral delayed release tablet: 1 tab(s) orally every 12 hours x 14 days, then decrease dose to one tablet daily  sucralfate 1 g/10 mL oral suspension: 10 milliliter(s) orally every 12 hours

## 2024-07-10 NOTE — DISCHARGE NOTE PROVIDER - NSDCFUADDINST_GEN_ALL_CORE_FT
Follow up with [facility] Electrophysiology department in 2 - 4 weeks. Our office will contact you in 3-5 days to schedule this appointment. Please call [phone] with questions or concerns.

## 2024-07-10 NOTE — DISCHARGE NOTE PROVIDER - NSDCFUSCHEDAPPT_GEN_ALL_CORE_FT
Ko Zhou  [facility] Physician [facility]  NEUROLOGY [address]  Scheduled Appointment: 07/23/2024    Ny Buchanan  [facility] Physician [facility]  FAMILYMED [address]  Scheduled Appointment: 07/24/2024    Elliot Mishra  [facility]  CARDIOLOGY [address]  Scheduled Appointment: 08/06/2024

## 2024-07-10 NOTE — DISCHARGE NOTE PROVIDER - CARE PROVIDER_API CALL
John Wiggins  Cardiac Electrophysiology  [address] [place] [address], [address]  [place] [address]  Phone: [phone]  Fax: [phone]  Follow Up Time:

## 2024-07-10 NOTE — PROGRESS NOTE ADULT - SUBJECTIVE AND OBJECTIVE BOX
Admission Criteria  Please admit the patient to the following service: CARDIOLOGY      Major Criteria:    - Continuous EKG monitoring is required for condition causing arrhythmia (hyperkalemia, etc)  - Significant volume load > 200 ml      Admit to: 4BKT     Patient is being admitted to the inpatient service due to high risk characteristics and need for further management/monitoring and is considered to be at a significantly increased risk of major adverse cardiac and vascular events if discharged.

## 2024-07-10 NOTE — ASU PATIENT PROFILE, ADULT - FALL HARM RISK - HARM RISK INTERVENTIONS

## 2024-07-10 NOTE — DISCHARGE NOTE PROVIDER - ATTENDING DISCHARGE PHYSICAL EXAMINATION:
T(C): 36.4 (07-12-24 @ 08:12), Max: 36.7 (07-11-24 @ 19:36)  HR: 52 (07-12-24 @ 08:12) (48 - 54)  BP: 167/90 (07-12-24 @ 08:12) (133/64 - 167/90)  RR: 18 (07-12-24 @ 08:12) (18 - 18)  SpO2: 98% (07-12-24 @ 08:12) (95% - 99%)    CONSTITUTIONAL: no apparent distress  EYES: PERRLA, EOMI, non-icteric  ENMT: Oral mucosa with moist membranes, no pharyngeal injection or exudates  RESP: No respiratory distress, clear to auscultation bilaterally, no wheezes or rales  CV: RRR, +S1S2, trace peripheral edema  GI: Soft, NT, ND  PSYCH: A+O x 3, mood and affect appropriate  NEURO: Cooperative, upper and lower motor function grossly intact bilaterally, sensation grossly intact throughout

## 2024-07-10 NOTE — PROGRESS NOTE ADULT - SUBJECTIVE AND OBJECTIVE BOX
PROCEDURE(S): Radiofrequency Ablation of Atrial Fibrillation    ELECTROPHYSIOLOGIST(S): John Wiggins MD         COMPLICATIONS:  none        DISPOSITION: observation      CONDITION: stable      Pt doing well s/p elective radiofrequency atrial fibrillation ablation (WACA/PVI, CTI, posterior wall, and anterior wall homogenization), access obtained via R sided femoral vein, hemostasis achieved with R groin vascade. Pt denies any complaints post procedure.     MEDICATIONS  (STANDING):  apixaban 5 milliGRAM(s) Oral <User Schedule>  atorvastatin 40 milliGRAM(s) Oral at bedtime  clopidogrel Tablet 75 milliGRAM(s) Oral daily  losartan 25 milliGRAM(s) Oral daily  pantoprazole    Tablet 40 milliGRAM(s) Oral every 12 hours  sucralfate suspension 1 Gram(s) Oral every 12 hours    MEDICATIONS  (PRN):  acetaminophen     Tablet .. 650 milliGRAM(s) Oral every 6 hours PRN Moderate Pain (4 - 6)  ALPRAZolam 0.25 milliGRAM(s) Oral every 8 hours PRN anxiety / insomnia  aluminum hydroxide/magnesium hydroxide/simethicone Suspension 30 milliLiter(s) Oral every 4 hours PRN Dyspepsia  benzocaine/menthol Lozenge 1 Lozenge Oral every 2 hours PRN Sore Throat  ondansetron Injectable 4 milliGRAM(s) IV Push every 6 hours PRN Nausea and/or Vomiting      Allergies    penicillin (Hives; Rash)  azithromycin (Hives; Rash)    Intolerances          VS:  T(C): 36.4 (07-10-24 @ 09:46), Max: 36.4 (07-10-24 @ 09:46)  HR: 119 (07-10-24 @ 10:45) (113 - 119)  BP: 170/113 (07-10-24 @ 10:45) (170/113 - 172/124)  RR: 17 (07-10-24 @ 09:46) (17 - 17)  SpO2: 97% (07-10-24 @ 09:46) (97% - 97%)  Wt(kg): --        Post procedure VS:  HR: 55  BP: 125/79  RR: 18  SpO2: 91      Exam:  General: NAD, A&O x 3  Card: S1/S2, RRR, no m/g/r  Resp: lungs CTA b/l  Abd: S/NT/ND  Groins: hemostatic sutures in place; sites C/D/I; no bleeding, hematoma, erythema, exudate or edema  Ext: no edema; distal pulses intact    I/O's    Input: 2211cc    MIRANDA: Preserved LVEF. No RUPAL thrombus    ECG: Sinus rhythm with intact conduction. QTc 512ms    Assessment:   63 y/o female with obesity (BMI 34), HTN, CVA (R-PCA), s/p TNK at [facility] and thrombectomy ([facility] 7/2023), ADORE, cerebral aneurysm s/p embolization (2/2024), and persistent AF (on Eliquis) s/p multiple MIRANDA/DCCV (most recently 5/29/24), who presented today 7/10/24 for and is now s/p elective radiofrequency atrial fibrillation ablation (WACA/PVI, CTI, posterior wall, and anterior wall homogenization), access obtained via R sided femoral vein, hemostasis achieved with R groin vascade. Pt denies any complaints post procedure.       Plan:   Bedrest x 2 hours, then OOB with assistance and progress as tolerated.   R groin check in AM  Pending groin status: c/w Eliquis 5mg Q12HR @   Start Protonix 40mg BID x 2 weeks then daily X 6 weeks.     Start Carafate 1gm BID x 2 weeks.   c/w Amiodarone 200mg QD  If QTc remains > 500ms on AM EKG, amiodarone to be discontinued  DO NOT HOLD, INTERRUPT OR REVERSE ANTICOAGULATION WITHOUT EXPLICIT APPROVAL FROM EP SERVICE  Continue other home medications.   Strict I/Os.  Please encourage incentive spirometry and ambulation once able.  Observation and monitoring on telemetry overnight with anticipated discharge in the AM and outpt follow up in 1 month.   PROCEDURE(S): Radiofrequency Ablation of Atrial Fibrillation    ELECTROPHYSIOLOGIST(S): John Wiggins MD         COMPLICATIONS:  none        DISPOSITION: observation      CONDITION: stable      Pt doing well s/p elective radiofrequency atrial fibrillation ablation (WACA/PVI, CTI, posterior wall, and anterior wall homogenization), access obtained via R sided femoral vein, hemostasis achieved with R groin vascade. Pt denies any complaints post procedure.     MEDICATIONS  (STANDING):  apixaban 5 milliGRAM(s) Oral <User Schedule>  atorvastatin 40 milliGRAM(s) Oral at bedtime  clopidogrel Tablet 75 milliGRAM(s) Oral daily  losartan 25 milliGRAM(s) Oral daily  pantoprazole    Tablet 40 milliGRAM(s) Oral every 12 hours  sucralfate suspension 1 Gram(s) Oral every 12 hours    MEDICATIONS  (PRN):  acetaminophen     Tablet .. 650 milliGRAM(s) Oral every 6 hours PRN Moderate Pain (4 - 6)  ALPRAZolam 0.25 milliGRAM(s) Oral every 8 hours PRN anxiety / insomnia  aluminum hydroxide/magnesium hydroxide/simethicone Suspension 30 milliLiter(s) Oral every 4 hours PRN Dyspepsia  benzocaine/menthol Lozenge 1 Lozenge Oral every 2 hours PRN Sore Throat  ondansetron Injectable 4 milliGRAM(s) IV Push every 6 hours PRN Nausea and/or Vomiting      Allergies    penicillin (Hives; Rash)  azithromycin (Hives; Rash)    Intolerances          VS:  T(C): 36.4 (07-10-24 @ 09:46), Max: 36.4 (07-10-24 @ 09:46)  HR: 119 (07-10-24 @ 10:45) (113 - 119)  BP: 170/113 (07-10-24 @ 10:45) (170/113 - 172/124)  RR: 17 (07-10-24 @ 09:46) (17 - 17)  SpO2: 97% (07-10-24 @ 09:46) (97% - 97%)  Wt(kg): --        Post procedure VS:  HR: 55  BP: 125/79  RR: 18  SpO2: 91      Exam:  General: NAD, A&O x 3  Card: S1/S2, RRR, no m/g/r  Resp: lungs CTA b/l  Abd: S/NT/ND  Groins: hemostatic sutures in place; sites C/D/I; no bleeding, hematoma, erythema, exudate or edema  Ext: no edema; distal pulses intact    I/O's    Input: 2211cc    MIRANDA: Preserved LVEF. No RUPAL thrombus    ECG: Sinus rhythm with intact conduction. QTc 512ms    Assessment:   63 y/o female with obesity (BMI 34), HTN, CVA (R-PCA), s/p TNK at [facility] and thrombectomy ([facility] 7/2023), ADORE, cerebral aneurysm s/p embolization (2/2024), and persistent AF (on Eliquis) s/p multiple MIRANDA/DCCV (most recently 5/29/24), who presented today 7/10/24 for and is now s/p elective radiofrequency atrial fibrillation ablation (WACA/PVI, CTI, posterior wall, and anterior wall homogenization), access obtained via R sided femoral vein, hemostasis achieved with R groin vascade. Pt denies any complaints post procedure.       Plan:   Bedrest x 2 hours, then OOB with assistance and progress as tolerated.   R groin check in AM  Pending groin status: c/w Eliquis 5mg Q12HR @ 2200  Start Protonix 40mg BID x 2 weeks then daily X 6 weeks.   Lasix 20mg IVP @ 2100, then 20mg PO QD x 3 days  Start Carafate 1gm BID x 2 weeks.   c/w Amiodarone 200mg QD  If QTc remains > 500ms on AM EKG, amiodarone to be discontinued  DO NOT HOLD, INTERRUPT OR REVERSE ANTICOAGULATION WITHOUT EXPLICIT APPROVAL FROM EP SERVICE  Continue other home medications.   Strict I/Os.  Please encourage incentive spirometry and ambulation once able.  Observation and monitoring on telemetry overnight with anticipated discharge in the AM and outpt follow up in 1 month.   PROCEDURE(S): Radiofrequency Ablation of Atrial Fibrillation    ELECTROPHYSIOLOGIST(S): John Wiggins MD         COMPLICATIONS:  none        DISPOSITION: observation      CONDITION: stable      Pt doing well s/p elective radiofrequency atrial fibrillation ablation (WACA/PVI, CTI, posterior wall, and anterior wall homogenization), access obtained via R sided femoral vein, hemostasis achieved with R groin vascade. Pt denies any complaints post procedure.     MEDICATIONS  (STANDING):  apixaban 5 milliGRAM(s) Oral <User Schedule>  atorvastatin 40 milliGRAM(s) Oral at bedtime  clopidogrel Tablet 75 milliGRAM(s) Oral daily  losartan 25 milliGRAM(s) Oral daily  pantoprazole    Tablet 40 milliGRAM(s) Oral every 12 hours  sucralfate suspension 1 Gram(s) Oral every 12 hours    MEDICATIONS  (PRN):  acetaminophen     Tablet .. 650 milliGRAM(s) Oral every 6 hours PRN Moderate Pain (4 - 6)  ALPRAZolam 0.25 milliGRAM(s) Oral every 8 hours PRN anxiety / insomnia  aluminum hydroxide/magnesium hydroxide/simethicone Suspension 30 milliLiter(s) Oral every 4 hours PRN Dyspepsia  benzocaine/menthol Lozenge 1 Lozenge Oral every 2 hours PRN Sore Throat  ondansetron Injectable 4 milliGRAM(s) IV Push every 6 hours PRN Nausea and/or Vomiting      Allergies    penicillin (Hives; Rash)  azithromycin (Hives; Rash)    Intolerances          VS:  T(C): 36.4 (07-10-24 @ 09:46), Max: 36.4 (07-10-24 @ 09:46)  HR: 119 (07-10-24 @ 10:45) (113 - 119)  BP: 170/113 (07-10-24 @ 10:45) (170/113 - 172/124)  RR: 17 (07-10-24 @ 09:46) (17 - 17)  SpO2: 97% (07-10-24 @ 09:46) (97% - 97%)  Wt(kg): --        Post procedure VS:  HR: 55  BP: 125/79  RR: 18  SpO2: 91      Exam:  General: NAD, A&O x 3  Card: S1/S2, RRR, no m/g/r  Resp: lungs CTA b/l  Abd: S/NT/ND  Groins: hemostatic sutures in place; sites C/D/I; no bleeding, hematoma, erythema, exudate or edema  Ext: no edema; distal pulses intact    I/O's    Input: 2211cc    MIRANDA: Preserved LVEF. No RUPAL thrombus    ECG: Sinus rhythm with intact conduction. QTc 512ms    Assessment:   65 y/o female with obesity (BMI 34), HTN, CVA (R-PCA), s/p TNK at [facility] and thrombectomy ([facility] 7/2023), ADORE, cerebral aneurysm s/p embolization (2/2024), and persistent AF (on Eliquis) s/p multiple MIRANDA/DCCV (most recently 5/29/24), who presented today 7/10/24 for and is now s/p elective radiofrequency atrial fibrillation ablation (WACA/PVI, CTI, posterior wall, and anterior wall homogenization), access obtained via R sided femoral vein, hemostasis achieved with R groin vascade. Pt denies any complaints post procedure.       Plan:   Bedrest x 2 hours, then OOB with assistance and progress as tolerated.   R groin check in AM  Pending groin status: c/w Eliquis 5mg Q12HR @ 2200  Start Protonix 40mg BID x 2 weeks then daily X 6 weeks.   Lasix 20mg IVP @ 2100, then 20mg PO QD x 3 days  Start Carafate 1gm BID x 2 weeks.   c/w Amiodarone 200mg QD  If QTc remains > 500ms on AM EKG, amiodarone to be discontinued  Stop Metoprolol Tartrate 100mg Q12HR  Start Metoprolol Succinate 100mg QD  DO NOT HOLD, INTERRUPT OR REVERSE ANTICOAGULATION WITHOUT EXPLICIT APPROVAL FROM EP SERVICE  Continue other home medications.   Strict I/Os.  Please encourage incentive spirometry and ambulation once able.  Observation and monitoring on telemetry overnight with anticipated discharge in the AM and outpt follow up in 1 month.

## 2024-07-10 NOTE — DISCHARGE NOTE PROVIDER - NSDCCPCAREPLAN_GEN_ALL_CORE_FT
PRINCIPAL DISCHARGE DIAGNOSIS  Diagnosis: Atrial fibrillation  Assessment and Plan of Treatment:      PRINCIPAL DISCHARGE DIAGNOSIS  Diagnosis: Atrial fibrillation  Assessment and Plan of Treatment: Continue all cardiac medications as prescribed and follow up with EP and cardiology at your scheduled appointment.      SECONDARY DISCHARGE DIAGNOSES  Diagnosis: ZIGGY (acute kidney injury)  Assessment and Plan of Treatment: please follow up with your primary care doctor in 1 week to repeat blood work.

## 2024-07-11 LAB
ANION GAP SERPL CALC-SCNC: 16 MMOL/L — SIGNIFICANT CHANGE UP (ref 5–17)
ANION GAP SERPL CALC-SCNC: 17 MMOL/L — SIGNIFICANT CHANGE UP (ref 5–17)
ANION GAP SERPL CALC-SCNC: 17 MMOL/L — SIGNIFICANT CHANGE UP (ref 5–17)
BUN SERPL-MCNC: 22.9 MG/DL — HIGH (ref 8–20)
BUN SERPL-MCNC: 28.6 MG/DL — HIGH (ref 8–20)
BUN SERPL-MCNC: 33.4 MG/DL — HIGH (ref 8–20)
CALCIUM SERPL-MCNC: 8.3 MG/DL — LOW (ref 8.4–10.5)
CALCIUM SERPL-MCNC: 8.5 MG/DL — SIGNIFICANT CHANGE UP (ref 8.4–10.5)
CALCIUM SERPL-MCNC: 8.7 MG/DL — SIGNIFICANT CHANGE UP (ref 8.4–10.5)
CHLORIDE SERPL-SCNC: 101 MMOL/L — SIGNIFICANT CHANGE UP (ref 96–108)
CHLORIDE SERPL-SCNC: 101 MMOL/L — SIGNIFICANT CHANGE UP (ref 96–108)
CHLORIDE SERPL-SCNC: 99 MMOL/L — SIGNIFICANT CHANGE UP (ref 96–108)
CO2 SERPL-SCNC: 19 MMOL/L — LOW (ref 22–29)
CO2 SERPL-SCNC: 21 MMOL/L — LOW (ref 22–29)
CO2 SERPL-SCNC: 21 MMOL/L — LOW (ref 22–29)
CREAT SERPL-MCNC: 1.11 MG/DL — SIGNIFICANT CHANGE UP (ref 0.5–1.3)
CREAT SERPL-MCNC: 1.33 MG/DL — HIGH (ref 0.5–1.3)
CREAT SERPL-MCNC: 1.68 MG/DL — HIGH (ref 0.5–1.3)
EGFR: 33 ML/MIN/1.73M2 — LOW
EGFR: 44 ML/MIN/1.73M2 — LOW
EGFR: 55 ML/MIN/1.73M2 — LOW
GLUCOSE SERPL-MCNC: 119 MG/DL — HIGH (ref 70–99)
GLUCOSE SERPL-MCNC: 141 MG/DL — HIGH (ref 70–99)
GLUCOSE SERPL-MCNC: 159 MG/DL — HIGH (ref 70–99)
HCT VFR BLD CALC: 39.4 % — SIGNIFICANT CHANGE UP (ref 34.5–45)
HGB BLD-MCNC: 12.8 G/DL — SIGNIFICANT CHANGE UP (ref 11.5–15.5)
MAGNESIUM SERPL-MCNC: 1.9 MG/DL — SIGNIFICANT CHANGE UP (ref 1.6–2.6)
MCHC RBC-ENTMCNC: 29.1 PG — SIGNIFICANT CHANGE UP (ref 27–34)
MCHC RBC-ENTMCNC: 32.5 GM/DL — SIGNIFICANT CHANGE UP (ref 32–36)
MCV RBC AUTO: 89.5 FL — SIGNIFICANT CHANGE UP (ref 80–100)
PLATELET # BLD AUTO: 268 K/UL — SIGNIFICANT CHANGE UP (ref 150–400)
POTASSIUM SERPL-MCNC: 4.6 MMOL/L — SIGNIFICANT CHANGE UP (ref 3.5–5.3)
POTASSIUM SERPL-MCNC: 4.7 MMOL/L — SIGNIFICANT CHANGE UP (ref 3.5–5.3)
POTASSIUM SERPL-MCNC: 4.8 MMOL/L — SIGNIFICANT CHANGE UP (ref 3.5–5.3)
POTASSIUM SERPL-SCNC: 4.6 MMOL/L — SIGNIFICANT CHANGE UP (ref 3.5–5.3)
POTASSIUM SERPL-SCNC: 4.7 MMOL/L — SIGNIFICANT CHANGE UP (ref 3.5–5.3)
POTASSIUM SERPL-SCNC: 4.8 MMOL/L — SIGNIFICANT CHANGE UP (ref 3.5–5.3)
RBC # BLD: 4.4 M/UL — SIGNIFICANT CHANGE UP (ref 3.8–5.2)
RBC # FLD: 14 % — SIGNIFICANT CHANGE UP (ref 10.3–14.5)
SODIUM SERPL-SCNC: 137 MMOL/L — SIGNIFICANT CHANGE UP (ref 135–145)
SODIUM SERPL-SCNC: 137 MMOL/L — SIGNIFICANT CHANGE UP (ref 135–145)
SODIUM SERPL-SCNC: 138 MMOL/L — SIGNIFICANT CHANGE UP (ref 135–145)
WBC # BLD: 9.56 K/UL — SIGNIFICANT CHANGE UP (ref 3.8–10.5)
WBC # FLD AUTO: 9.56 K/UL — SIGNIFICANT CHANGE UP (ref 3.8–10.5)

## 2024-07-11 PROCEDURE — 99223 1ST HOSP IP/OBS HIGH 75: CPT

## 2024-07-11 PROCEDURE — 93010 ELECTROCARDIOGRAM REPORT: CPT

## 2024-07-11 RX ORDER — METOPROLOL TARTRATE 50 MG
1 TABLET ORAL
Qty: 30 | Refills: 3
Start: 2024-07-11 | End: 2024-11-07

## 2024-07-11 RX ORDER — METOPROLOL TARTRATE 50 MG
25 TABLET ORAL DAILY
Refills: 0 | Status: DISCONTINUED | OUTPATIENT
Start: 2024-07-11 | End: 2024-07-12

## 2024-07-11 RX ORDER — DEXTROSE MONOHYDRATE AND SODIUM CHLORIDE 5; .3 G/100ML; G/100ML
1000 INJECTION, SOLUTION INTRAVENOUS ONCE
Refills: 0 | Status: COMPLETED | OUTPATIENT
Start: 2024-07-11 | End: 2024-07-11

## 2024-07-11 RX ORDER — AMIODARONE HYDROCHLORIDE 50 MG/ML
200 INJECTION, SOLUTION INTRAVENOUS DAILY
Refills: 0 | Status: DISCONTINUED | OUTPATIENT
Start: 2024-07-11 | End: 2024-07-12

## 2024-07-11 RX ORDER — AMIODARONE HYDROCHLORIDE 50 MG/ML
1 INJECTION, SOLUTION INTRAVENOUS
Qty: 30 | Refills: 0
Start: 2024-07-11 | End: 2024-08-09

## 2024-07-11 RX ORDER — SODIUM CHLORIDE 0.9 % (FLUSH) 0.9 %
1000 SYRINGE (ML) INJECTION
Refills: 0 | Status: DISCONTINUED | OUTPATIENT
Start: 2024-07-11 | End: 2024-07-11

## 2024-07-11 RX ORDER — SODIUM CHLORIDE 0.9 % (FLUSH) 0.9 %
1000 SYRINGE (ML) INJECTION
Refills: 0 | Status: DISCONTINUED | OUTPATIENT
Start: 2024-07-11 | End: 2024-07-12

## 2024-07-11 RX ORDER — PANTOPRAZOLE SODIUM 40 MG/10ML
1 INJECTION, POWDER, FOR SOLUTION INTRAVENOUS
Qty: 70 | Refills: 0
Start: 2024-07-11 | End: 2024-09-04

## 2024-07-11 RX ORDER — FUROSEMIDE 10 MG/ML
1 INJECTION, SOLUTION INTRAMUSCULAR; INTRAVENOUS
Qty: 3 | Refills: 0
Start: 2024-07-11 | End: 2024-07-13

## 2024-07-11 RX ORDER — MAGNESIUM SULFATE 100 %
2 POWDER (GRAM) MISCELLANEOUS ONCE
Refills: 0 | Status: COMPLETED | OUTPATIENT
Start: 2024-07-11 | End: 2024-07-11

## 2024-07-11 RX ORDER — SUCRALFATE 1 G
10 TABLET ORAL
Qty: 300 | Refills: 0
Start: 2024-07-11 | End: 2024-07-24

## 2024-07-11 RX ADMIN — DEXTROSE MONOHYDRATE AND SODIUM CHLORIDE 500 MILLILITER(S): 5; .3 INJECTION, SOLUTION INTRAVENOUS at 11:00

## 2024-07-11 RX ADMIN — Medication 100 MILLILITER(S): at 17:01

## 2024-07-11 RX ADMIN — APIXABAN 5 MILLIGRAM(S): 5 TABLET, FILM COATED ORAL at 08:34

## 2024-07-11 RX ADMIN — APIXABAN 5 MILLIGRAM(S): 5 TABLET, FILM COATED ORAL at 21:02

## 2024-07-11 RX ADMIN — ATORVASTATIN CALCIUM 40 MILLIGRAM(S): 20 TABLET, FILM COATED ORAL at 21:02

## 2024-07-11 RX ADMIN — LOSARTAN POTASSIUM 25 MILLIGRAM(S): 100 TABLET, FILM COATED ORAL at 05:29

## 2024-07-11 RX ADMIN — Medication 1 APPLICATION(S): at 17:01

## 2024-07-11 RX ADMIN — Medication 25 GRAM(S): at 08:34

## 2024-07-11 RX ADMIN — Medication 50 MILLILITER(S): at 15:50

## 2024-07-11 RX ADMIN — PANTOPRAZOLE SODIUM 40 MILLIGRAM(S): 40 INJECTION, POWDER, FOR SOLUTION INTRAVENOUS at 05:29

## 2024-07-11 RX ADMIN — ALPRAZOLAM 0.25 MILLIGRAM(S): 2 TABLET ORAL at 21:01

## 2024-07-11 RX ADMIN — CLOPIDOGREL BISULFATE 75 MILLIGRAM(S): 75 TABLET, FILM COATED ORAL at 08:33

## 2024-07-11 RX ADMIN — Medication 50 MILLILITER(S): at 21:01

## 2024-07-11 RX ADMIN — Medication 1 GRAM(S): at 17:01

## 2024-07-11 RX ADMIN — Medication 1 APPLICATION(S): at 05:40

## 2024-07-11 RX ADMIN — Medication 1 GRAM(S): at 05:29

## 2024-07-11 RX ADMIN — PANTOPRAZOLE SODIUM 40 MILLIGRAM(S): 40 INJECTION, POWDER, FOR SOLUTION INTRAVENOUS at 17:01

## 2024-07-11 RX ADMIN — ALPRAZOLAM 0.25 MILLIGRAM(S): 2 TABLET ORAL at 01:19

## 2024-07-11 RX ADMIN — FUROSEMIDE 20 MILLIGRAM(S): 10 INJECTION, SOLUTION INTRAMUSCULAR; INTRAVENOUS at 05:29

## 2024-07-11 RX ADMIN — BENZOCAINE AND MENTHOL 1 LOZENGE: 15; 3.6 LOZENGE ORAL at 21:02

## 2024-07-11 NOTE — PROGRESS NOTE ADULT - SUBJECTIVE AND OBJECTIVE BOX
Pt doing well POD #1 s/p radiofrequency ablation of atrial fibrillation ablation (WACA/PVI, CTI, posterior wall, and anterior wall homogenization). Overnight right groin with oozing.  Treated with Lidocaine and manually pressure.  Dressing CDI this AM and pt denies any complaints.     EKG: SR 47bpm, JINNY 174ms, QRSD 86ms, QTc 460ms (manually measured using Bazett formula)  TELE: SR ~46-56bpm    MEDICATIONS  (STANDING):  aMIOdarone    Tablet 200 milliGRAM(s) Oral daily  apixaban 5 milliGRAM(s) Oral <User Schedule>  atorvastatin 40 milliGRAM(s) Oral at bedtime  clopidogrel Tablet 75 milliGRAM(s) Oral daily  furosemide    Tablet 20 milliGRAM(s) Oral daily  losartan 25 milliGRAM(s) Oral daily  metoprolol succinate ER 25 milliGRAM(s) Oral daily  neomycin/bacitracin/polymyxin Topical Ointment 1 Application(s) Topical two times a day  pantoprazole    Tablet 40 milliGRAM(s) Oral every 12 hours  sucralfate suspension 1 Gram(s) Oral every 12 hours    MEDICATIONS  (PRN):  acetaminophen     Tablet .. 650 milliGRAM(s) Oral every 6 hours PRN Moderate Pain (4 - 6)  ALPRAZolam 0.25 milliGRAM(s) Oral every 8 hours PRN anxiety / insomnia  aluminum hydroxide/magnesium hydroxide/simethicone Suspension 30 milliLiter(s) Oral every 4 hours PRN Dyspepsia  benzocaine/menthol Lozenge 1 Lozenge Oral every 2 hours PRN Sore Throat      Allergies  penicillin (Hives; Rash)  azithromycin (Hives; Rash)        PAST MEDICAL & SURGICAL HISTORY:  HTN (hypertension)  CVA (cerebrovascular accident)  Afib  Cerebral aneurysm  Obesity  ADORE (obstructive sleep apnea)  H/O cerebral embolism  H/O carpal tunnel repair  History of cardioversion  H/O coronary angiogram  History of cerebral angiography  History of  section        Vital Signs Last 24 Hrs  T(C): 36.7 (2024 08:40), Max: 36.7 (2024 04:17)  T(F): 98 (2024 08:40), Max: 98 (2024 04:17)  HR: 54 (2024 08:40) (46 - 119)  BP: 120/74 (2024 08:40) (94/82 - 172/124)  BP(mean): 92 (2024 00:28) (92 - 95)  RR: 18 (2024 08:40) (16 - 18)  SpO2: 96% (2024 08:40) (96% - 100%)    Parameters below as of 2024 08:40  Patient On (Oxygen Delivery Method): room air        Physical Exam:  Constitutional: NAD, AAOx3  Cardiovascular: +S1S2 RRR  Pulmonary: CTA b/l, unlabored  Abd: soft NTND   Right Groin: no bleeding, hematoma, edema  Extremities: no pedal edema, +distal pulses b/l  Neuro: CN II-XII grossly intact, HEATH x4     LABS:                        12.8   9.56  )-----------( 268      ( 2024 05:38 )             39.4     07-11    138  |  101  |  22.9<H>  ----------------------------<  119<H>  4.7   |  21.0<L>  |  1.11    Ca    8.7      2024 05:38  Mg     1.9     07-11        Urinalysis Basic - ( 2024 05:38 )    Color: x / Appearance: x / SG: x / pH: x  Gluc: 119 mg/dL / Ketone: x  / Bili: x / Urobili: x   Blood: x / Protein: x / Nitrite: x   Leuk Esterase: x / RBC: x / WBC x   Sq Epi: x / Non Sq Epi: x / Bacteria: x        Assessment:   63 y/o female with obesity (BMI 34), HTN, CVA (R-PCA), s/p TNK at [facility] and thrombectomy ([facility] 2023), ADORE, cerebral aneurysm s/p embolization (2024), and persistent AF (on Eliquis) s/p multiple MIRANDA/DCCV (most recently 24), who presented today 7/10/24 for and is now POD #1 s/p elective radiofrequency atrial fibrillation ablation (WACA/PVI, CTI, posterior wall, and anterior wall homogenization), access obtained via R sided femoral vein, hemostasis achieved with R groin vascade. Pt denies any complaints post procedure.       Plan:   Eliquis 5mg Q12HR @ 2200  DO NOT HOLD, INTERRUPT OR REVERSE ANTICOAGULATION WITHOUT EXPLICIT APPROVAL FROM EP SERVICE  Start Protonix 40mg BID x 2 weeks then daily X 6 weeks.   Lasix 20mg IVP @ 2100, then 20mg PO QD x 3 days  Start Carafate 1gm BID x 2 weeks.   Amiodarone 200mg QD  Decrease dose of Metoprolol Tartrate 25mg daily  Continue other home medications.   Pt instructed as to activity limitations - no lifting/pushing/pulling >10 lbs or strenuous exercise x 1 week.   Pt instructed as to access site care and f/up - written instructions included in d/c documents.  Outpt f/up in 2-4 weeks - office will contact pt to schedule.  Case D/w Dr Wiggins.       Pt doing well POD #1 s/p radiofrequency ablation of atrial fibrillation ablation (WACA/PVI, CTI, posterior wall, and anterior wall homogenization). Overnight right groin with oozing.  Treated with Lidocaine and manually pressure.  Dressing CDI this AM and pt denies any complaints.     EKG: SR 47bpm, JINNY 174ms, QRSD 86ms, QTc 460ms (manually measured using Bazett formula)  TELE: SR ~46-56bpm    MEDICATIONS  (STANDING):  aMIOdarone    Tablet 200 milliGRAM(s) Oral daily  apixaban 5 milliGRAM(s) Oral <User Schedule>  atorvastatin 40 milliGRAM(s) Oral at bedtime  clopidogrel Tablet 75 milliGRAM(s) Oral daily  furosemide    Tablet 20 milliGRAM(s) Oral daily  losartan 25 milliGRAM(s) Oral daily  metoprolol succinate ER 25 milliGRAM(s) Oral daily  neomycin/bacitracin/polymyxin Topical Ointment 1 Application(s) Topical two times a day  pantoprazole    Tablet 40 milliGRAM(s) Oral every 12 hours  sucralfate suspension 1 Gram(s) Oral every 12 hours    MEDICATIONS  (PRN):  acetaminophen     Tablet .. 650 milliGRAM(s) Oral every 6 hours PRN Moderate Pain (4 - 6)  ALPRAZolam 0.25 milliGRAM(s) Oral every 8 hours PRN anxiety / insomnia  aluminum hydroxide/magnesium hydroxide/simethicone Suspension 30 milliLiter(s) Oral every 4 hours PRN Dyspepsia  benzocaine/menthol Lozenge 1 Lozenge Oral every 2 hours PRN Sore Throat      Allergies  penicillin (Hives; Rash)  azithromycin (Hives; Rash)        PAST MEDICAL & SURGICAL HISTORY:  HTN (hypertension)  CVA (cerebrovascular accident)  Afib  Cerebral aneurysm  Obesity  ADORE (obstructive sleep apnea)  H/O cerebral embolism  H/O carpal tunnel repair  History of cardioversion  H/O coronary angiogram  History of cerebral angiography  History of  section        Vital Signs Last 24 Hrs  T(C): 36.7 (2024 08:40), Max: 36.7 (2024 04:17)  T(F): 98 (2024 08:40), Max: 98 (2024 04:17)  HR: 54 (2024 08:40) (46 - 119)  BP: 120/74 (2024 08:40) (94/82 - 172/124)  BP(mean): 92 (2024 00:28) (92 - 95)  RR: 18 (2024 08:40) (16 - 18)  SpO2: 96% (2024 08:40) (96% - 100%)    Parameters below as of 2024 08:40  Patient On (Oxygen Delivery Method): room air        Physical Exam:  Constitutional: NAD, AAOx3  Cardiovascular: +S1S2 RRR  Pulmonary: CTA b/l, unlabored  Abd: soft NTND   Right Groin: no bleeding, hematoma, edema  Extremities: no pedal edema, +distal pulses b/l  Neuro: CN II-XII grossly intact, HEATH x4     LABS:                        12.8   9.56  )-----------( 268      ( 2024 05:38 )             39.4     07-11    138  |  101  |  22.9<H>  ----------------------------<  119<H>  4.7   |  21.0<L>  |  1.11    Ca    8.7      2024 05:38  Mg     1.9     07-11        Urinalysis Basic - ( 2024 05:38 )    Color: x / Appearance: x / SG: x / pH: x  Gluc: 119 mg/dL / Ketone: x  / Bili: x / Urobili: x   Blood: x / Protein: x / Nitrite: x   Leuk Esterase: x / RBC: x / WBC x   Sq Epi: x / Non Sq Epi: x / Bacteria: x        Assessment:   63 y/o female with obesity (BMI 34), HTN, CVA (R-PCA), s/p TNK at [facility] and thrombectomy ([facility] 2023), ADORE, cerebral aneurysm s/p embolization (2024), and persistent AF (on Eliquis) s/p multiple MIRANDA/DCCV (most recently 24), who presented today 7/10/24 for and is now POD #1 s/p elective radiofrequency atrial fibrillation ablation (WACA/PVI, CTI, posterior wall, and anterior wall homogenization), access obtained via R sided femoral vein, hemostasis achieved with R groin vascade. Pt denies any complaints post procedure.       Plan:   Eliquis 5mg Q12HR @ 2200  DO NOT HOLD, INTERRUPT OR REVERSE ANTICOAGULATION WITHOUT EXPLICIT APPROVAL FROM EP SERVICE  Start Protonix 40mg BID x 2 weeks then daily X 6 weeks.   Cr slightly elevated post-op, Rpt Cr 1.3 from baseline 0.7.  Discontinue Lasix, give IV fluids and rpt bmp in afternoon  Start Carafate 1gm BID x 2 weeks.   Amiodarone 200mg QD  Decrease dose of Metoprolol Tartrate 25mg daily  Continue other home medications.   Pt instructed as to activity limitations - no lifting/pushing/pulling >10 lbs or strenuous exercise x 1 week.   Pt instructed as to access site care and f/up - written instructions included in d/c documents.  Outpt f/up in 2-4 weeks - office will contact pt to schedule.  Case D/w Dr Wiggins.       Pt doing well POD #1 s/p radiofrequency ablation of atrial fibrillation ablation (WACA/PVI, CTI, posterior wall, and anterior wall homogenization). Overnight right groin with oozing.  Treated with Lidocaine and manually pressure.  Dressing CDI this AM and pt denies any complaints.     EKG: SR 47bpm, JINNY 174ms, QRSD 86ms, QTc 460ms (manually measured using Bazett formula)  TELE: SR ~46-56bpm    MEDICATIONS  (STANDING):  aMIOdarone    Tablet 200 milliGRAM(s) Oral daily  apixaban 5 milliGRAM(s) Oral <User Schedule>  atorvastatin 40 milliGRAM(s) Oral at bedtime  clopidogrel Tablet 75 milliGRAM(s) Oral daily  furosemide    Tablet 20 milliGRAM(s) Oral daily  losartan 25 milliGRAM(s) Oral daily  metoprolol succinate ER 25 milliGRAM(s) Oral daily  neomycin/bacitracin/polymyxin Topical Ointment 1 Application(s) Topical two times a day  pantoprazole    Tablet 40 milliGRAM(s) Oral every 12 hours  sucralfate suspension 1 Gram(s) Oral every 12 hours    MEDICATIONS  (PRN):  acetaminophen     Tablet .. 650 milliGRAM(s) Oral every 6 hours PRN Moderate Pain (4 - 6)  ALPRAZolam 0.25 milliGRAM(s) Oral every 8 hours PRN anxiety / insomnia  aluminum hydroxide/magnesium hydroxide/simethicone Suspension 30 milliLiter(s) Oral every 4 hours PRN Dyspepsia  benzocaine/menthol Lozenge 1 Lozenge Oral every 2 hours PRN Sore Throat      Allergies  penicillin (Hives; Rash)  azithromycin (Hives; Rash)        PAST MEDICAL & SURGICAL HISTORY:  HTN (hypertension)  CVA (cerebrovascular accident)  Afib  Cerebral aneurysm  Obesity  ADORE (obstructive sleep apnea)  H/O cerebral embolism  H/O carpal tunnel repair  History of cardioversion  H/O coronary angiogram  History of cerebral angiography  History of  section        Vital Signs Last 24 Hrs  T(C): 36.7 (2024 08:40), Max: 36.7 (2024 04:17)  T(F): 98 (2024 08:40), Max: 98 (2024 04:17)  HR: 54 (2024 08:40) (46 - 119)  BP: 120/74 (2024 08:40) (94/82 - 172/124)  BP(mean): 92 (2024 00:28) (92 - 95)  RR: 18 (2024 08:40) (16 - 18)  SpO2: 96% (2024 08:40) (96% - 100%)    Parameters below as of 2024 08:40  Patient On (Oxygen Delivery Method): room air        Physical Exam:  Constitutional: NAD, AAOx3  Cardiovascular: +S1S2 RRR  Pulmonary: CTA b/l, unlabored  Abd: soft NTND   Right Groin: no bleeding, hematoma, edema  Extremities: no pedal edema, +distal pulses b/l  Neuro: CN II-XII grossly intact, HEATH x4     LABS:                        12.8   9.56  )-----------( 268      ( 2024 05:38 )             39.4     07-11    138  |  101  |  22.9<H>  ----------------------------<  119<H>  4.7   |  21.0<L>  |  1.11    Ca    8.7      2024 05:38  Mg     1.9     07-11        Urinalysis Basic - ( 2024 05:38 )    Color: x / Appearance: x / SG: x / pH: x  Gluc: 119 mg/dL / Ketone: x  / Bili: x / Urobili: x   Blood: x / Protein: x / Nitrite: x   Leuk Esterase: x / RBC: x / WBC x   Sq Epi: x / Non Sq Epi: x / Bacteria: x        Assessment:   63 y/o female with obesity (BMI 34), HTN, CVA (R-PCA), s/p TNK at [facility] and thrombectomy ([facility] 2023), ADORE, cerebral aneurysm s/p embolization (2024), and persistent AF (on Eliquis) s/p multiple MIRANDA/DCCV (most recently 24), who presented today 7/10/24 for and is now POD #1 s/p elective radiofrequency atrial fibrillation ablation (WACA/PVI, CTI, posterior wall, and anterior wall homogenization), access obtained via R sided femoral vein, hemostasis achieved with R groin vascade. Pt denies any complaints post procedure.       Plan:   Eliquis 5mg Q12HR @ 2200  DO NOT HOLD, INTERRUPT OR REVERSE ANTICOAGULATION WITHOUT EXPLICIT APPROVAL FROM EP SERVICE  Start Protonix 40mg BID x 2 weeks then daily X 6 weeks.   Cr slightly elevated post-op, Rpt Cr 1.3 from baseline 0.7.  Discontinue Lasix, give IV fluids and rpt bmp in afternoon  Start Carafate 1gm BID x 2 weeks.   Amiodarone 200mg QD  Decrease dose of Metoprolol Tartrate 25mg daily  Continue other home medications.   Pt instructed as to activity limitations - no lifting/pushing/pulling >10 lbs or strenuous exercise x 1 week.   Pt instructed as to access site care and f/up - written instructions included in d/c documents.  Outpt f/up in 2-4 weeks - office will contact pt to schedule.  Case D/w Dr Wiggins.    Addendum:  Rpt Cr now up to 1.68,  Pt has ambulated and voided 300 cc earlier today, Ask RN to bladder scan pt.  Nephrology consulted (Dr New) and transferred to medicine service (Dr Holland).  EP will continue to follow.

## 2024-07-11 NOTE — H&P ADULT - ASSESSMENT
63 y/o female with obesity (BMI 34), HTN, CVA (R-PCA), s/p TNK at [facility] and thrombectomy ([facility] 7/2023), ADORE, cerebral aneurysm s/p embolization (2/2024), and persistent AF (on Eliquis) s/p multiple MIRANDA/DCCV (most recently 5/29/24), who presented 7/10/24 for elective radiofrequency ablation. Procedure went well and without immediate complications but noted to have developed ZIGGY later on for which EP service requested admission for.    ZIGGY  - likely pre-renal 2/2 over diuresis  - Cr noted to be 1.11 this morning (baseline 0.7-0.8) which trended up to 1.3 then 1.6 in the afternoon despite 1L LR  - urine studies unlikely to be helpful due to recent fluid administration  - start IV fluid hydration x 12hr  - nephro reached out to by EP team, f/u recs when available  - trend BMP    Afib s/p radiofreq ablation   HTN  Hx of CVA  - c/w telemetry monitoring  - c/w eliquis and plavix  - c/w amiodarone, toprol and losartan as ordered  - protonix and carafate as per EP recs  - statin  - EP following    Anxiety  - c/w xanax PRN    DVT ppx: eliquis

## 2024-07-11 NOTE — H&P ADULT - NSHPLABSRESULTS_GEN_ALL_CORE
12.8   9.56  )-----------( 268      ( 11 Jul 2024 05:38 )             39.4     07-11    137  |  101  |  33.4<H>  ----------------------------<  141<H>  4.8   |  19.0<L>  |  1.68<H>    Ca    8.3<L>      11 Jul 2024 14:00  Mg     1.9     07-11

## 2024-07-11 NOTE — CHART NOTE - NSCHARTNOTEFT_GEN_A_CORE
Called by bedside nurse stating pt. was oozing R groin   1 cc of epi with Lido was applied to area. Oozing resolved.  Pt tolerated procedure well. Skin warm and clean w/o bleeding or hematoma. Pulses in the right and left lower extremity are palpable +2.      Problem: Oozing R groin     Plan:   - Currently no active bleeding, hematoma  + palp pedal pulse. `  - Monitoring of the right groin and both lower extremities including neuro-vascular checks and vital signs. Nurse aware  - R groin post procedural care and instructions reviewed with patient.  - Pt. asymptomatic and hemodynamically stable
Pt is a 63 y/o female with obesity (BMI 34), HTN, CVA (R-PCA), s/p TNK at [facility] and thrombectomy ([facility] 7/2023), ADORE, cerebral aneurysm s/p embolization (2/2024), and persistent AF (on Eliquis) s/p multiple MIRANDA/DCCV (most recently 5/29/24), who presents today for a MIRANDA / AF ablation with Dr. Wiggins. Pt did well following her most recent DCCV but was noted to be back in AF at time of follow up. Pt reports mild fatigue associated with her AF, no other symptoms. Pt had a MRA performed on 6/11/2024 which revealed no evidence of residual or recurrent aneurysm and Antegrade flow. Pt reports strict compliance with Eliquis (5mg Q12HR) and states her last dose was yesterday evening. CT heart LA performed yesterday (7/9/24) revealed "Small filling defect in the left atrial appendage may reflect slow flow of contrast versus thrombus". MIRANDA will be performed prior to procedure to clear RUPAL of thrombus. Pt currently reports feeling nervous for the procedure, no other complaints. PST from 6/26/24 reviewed and pt reports no changes in health. Pt confirms last PO intake (beside meds with sips of water) was > 8 hours PTA. Will maintain NPO status and continue to monitor pending procedure.
TLC removed from Rt IJ.  Pt placed supine. Area cleaned with alcohol. Sutures removed without difficulty.  Catheter removed slowly without difficulty.  Clean, dry dressing applied until bleeding ceased. Pt tolerated well.

## 2024-07-11 NOTE — H&P ADULT - TIME BILLING
chart review, patient evaluation, documentation, coordination of care and discussion with nurses and consultants.

## 2024-07-11 NOTE — H&P ADULT - NSHPPHYSICALEXAM_GEN_ALL_CORE
T(C): 36.7 (07-11-24 @ 08:40), Max: 36.7 (07-11-24 @ 04:17)  HR: 54 (07-11-24 @ 08:40) (46 - 57)  BP: 120/74 (07-11-24 @ 08:40) (94/82 - 126/76)  RR: 18 (07-11-24 @ 08:40) (16 - 18)  SpO2: 96% (07-11-24 @ 08:40) (96% - 100%)    CONSTITUTIONAL: no apparent distress  EYES: PERRLA, EOMI, non-icteric  ENMT: Oral mucosa with moist membranes, no pharyngeal injection or exudates  RESP: No respiratory distress, clear to auscultation bilaterally, no wheezes or rales  CV: RRR, +S1S2, +1 peripheral edema  GI: Soft, NT, ND, no rebound, no guarding  PSYCH: A+O x 3, mood and affect appropriate  NEURO: Cooperative, upper and lower motor function grossly intact bilaterally, sensation grossly intact throughout

## 2024-07-12 ENCOUNTER — TRANSCRIPTION ENCOUNTER (OUTPATIENT)
Age: 64
End: 2024-07-12

## 2024-07-12 VITALS
RESPIRATION RATE: 18 BRPM | SYSTOLIC BLOOD PRESSURE: 133 MMHG | TEMPERATURE: 98 F | DIASTOLIC BLOOD PRESSURE: 74 MMHG | HEART RATE: 55 BPM | OXYGEN SATURATION: 98 %

## 2024-07-12 LAB
ANION GAP SERPL CALC-SCNC: 14 MMOL/L — SIGNIFICANT CHANGE UP (ref 5–17)
ANION GAP SERPL CALC-SCNC: 16 MMOL/L — SIGNIFICANT CHANGE UP (ref 5–17)
BUN SERPL-MCNC: 32.4 MG/DL — HIGH (ref 8–20)
BUN SERPL-MCNC: 35.8 MG/DL — HIGH (ref 8–20)
CALCIUM SERPL-MCNC: 8.2 MG/DL — LOW (ref 8.4–10.5)
CALCIUM SERPL-MCNC: 8.3 MG/DL — LOW (ref 8.4–10.5)
CHLORIDE SERPL-SCNC: 102 MMOL/L — SIGNIFICANT CHANGE UP (ref 96–108)
CHLORIDE SERPL-SCNC: 103 MMOL/L — SIGNIFICANT CHANGE UP (ref 96–108)
CO2 SERPL-SCNC: 19 MMOL/L — LOW (ref 22–29)
CO2 SERPL-SCNC: 20 MMOL/L — LOW (ref 22–29)
CREAT SERPL-MCNC: 1.15 MG/DL — SIGNIFICANT CHANGE UP (ref 0.5–1.3)
CREAT SERPL-MCNC: 1.39 MG/DL — HIGH (ref 0.5–1.3)
EGFR: 42 ML/MIN/1.73M2 — LOW
EGFR: 53 ML/MIN/1.73M2 — LOW
GLUCOSE SERPL-MCNC: 94 MG/DL — SIGNIFICANT CHANGE UP (ref 70–99)
GLUCOSE SERPL-MCNC: 98 MG/DL — SIGNIFICANT CHANGE UP (ref 70–99)
POTASSIUM SERPL-MCNC: 4.2 MMOL/L — SIGNIFICANT CHANGE UP (ref 3.5–5.3)
POTASSIUM SERPL-MCNC: 4.3 MMOL/L — SIGNIFICANT CHANGE UP (ref 3.5–5.3)
POTASSIUM SERPL-SCNC: 4.2 MMOL/L — SIGNIFICANT CHANGE UP (ref 3.5–5.3)
POTASSIUM SERPL-SCNC: 4.3 MMOL/L — SIGNIFICANT CHANGE UP (ref 3.5–5.3)
SODIUM SERPL-SCNC: 136 MMOL/L — SIGNIFICANT CHANGE UP (ref 135–145)
SODIUM SERPL-SCNC: 137 MMOL/L — SIGNIFICANT CHANGE UP (ref 135–145)

## 2024-07-12 PROCEDURE — 93655 ICAR CATH ABLTJ DSCRT ARRHYT: CPT

## 2024-07-12 PROCEDURE — 93623 PRGRMD STIMJ&PACG IV RX NFS: CPT

## 2024-07-12 PROCEDURE — 85027 COMPLETE CBC AUTOMATED: CPT

## 2024-07-12 PROCEDURE — 93320 DOPPLER ECHO COMPLETE: CPT

## 2024-07-12 PROCEDURE — 83735 ASSAY OF MAGNESIUM: CPT

## 2024-07-12 PROCEDURE — C1731: CPT

## 2024-07-12 PROCEDURE — 93656 COMPRE EP EVAL ABLTJ ATR FIB: CPT

## 2024-07-12 PROCEDURE — 76376 3D RENDER W/INTRP POSTPROCES: CPT

## 2024-07-12 PROCEDURE — 93005 ELECTROCARDIOGRAM TRACING: CPT

## 2024-07-12 PROCEDURE — 36415 COLL VENOUS BLD VENIPUNCTURE: CPT

## 2024-07-12 PROCEDURE — 93325 DOPPLER ECHO COLOR FLOW MAPG: CPT

## 2024-07-12 PROCEDURE — C1732: CPT

## 2024-07-12 PROCEDURE — 80048 BASIC METABOLIC PNL TOTAL CA: CPT

## 2024-07-12 PROCEDURE — 93622 COMP EP EVAL L VENTR PAC&REC: CPT

## 2024-07-12 PROCEDURE — C9399: CPT

## 2024-07-12 PROCEDURE — C1894: CPT

## 2024-07-12 PROCEDURE — C8925: CPT

## 2024-07-12 PROCEDURE — C1766: CPT

## 2024-07-12 PROCEDURE — 93657 TX L/R ATRIAL FIB ADDL: CPT

## 2024-07-12 PROCEDURE — C1889: CPT

## 2024-07-12 PROCEDURE — 99239 HOSP IP/OBS DSCHRG MGMT >30: CPT

## 2024-07-12 PROCEDURE — C1759: CPT

## 2024-07-12 PROCEDURE — C1760: CPT

## 2024-07-12 RX ORDER — SODIUM CHLORIDE 0.9 % (FLUSH) 0.9 %
1000 SYRINGE (ML) INJECTION
Refills: 0 | Status: DISCONTINUED | OUTPATIENT
Start: 2024-07-12 | End: 2024-07-12

## 2024-07-12 RX ADMIN — APIXABAN 5 MILLIGRAM(S): 5 TABLET, FILM COATED ORAL at 09:57

## 2024-07-12 RX ADMIN — LOSARTAN POTASSIUM 25 MILLIGRAM(S): 100 TABLET, FILM COATED ORAL at 05:21

## 2024-07-12 RX ADMIN — PANTOPRAZOLE SODIUM 40 MILLIGRAM(S): 40 INJECTION, POWDER, FOR SOLUTION INTRAVENOUS at 05:21

## 2024-07-12 RX ADMIN — Medication 1 APPLICATION(S): at 05:22

## 2024-07-12 RX ADMIN — AMIODARONE HYDROCHLORIDE 200 MILLIGRAM(S): 50 INJECTION, SOLUTION INTRAVENOUS at 05:22

## 2024-07-12 RX ADMIN — CLOPIDOGREL BISULFATE 75 MILLIGRAM(S): 75 TABLET, FILM COATED ORAL at 09:57

## 2024-07-12 RX ADMIN — Medication 25 MILLIGRAM(S): at 05:21

## 2024-07-12 RX ADMIN — Medication 1 GRAM(S): at 05:22

## 2024-07-12 NOTE — DISCHARGE NOTE NURSING/CASE MANAGEMENT/SOCIAL WORK - NSDCPEFALRISK_GEN_ALL_CORE
For information on Fall & Injury Prevention, visit: [email or web address] OR  [email or web address] OR  https://www.cdc.gov/steadi/patient.html

## 2024-07-12 NOTE — DISCHARGE NOTE NURSING/CASE MANAGEMENT/SOCIAL WORK - PATIENT PORTAL LINK FT
You can access the FollowMyHealth Patient Portal offered by [facility] by registering at the following website: [email or web address]. By joining [employer]’s FollowMyHealth portal, you will also be able to view your health information using other applications (apps) compatible with our system.

## 2024-07-12 NOTE — PROGRESS NOTE ADULT - SUBJECTIVE AND OBJECTIVE BOX
Subjective:  No acute events overnight. Reports good UO for the past 12 hours following IVF.    Metoprolol decreased to 25mg yesterday.  Resting HR now improved to the 50s.  Denies dizziness or lightheadedness.     TELE:  SB in the 50s, no pauses or significant bradyarrhythmia events.  (Resting HR improved from 40 -> 50s on decreased BB)    MEDICATIONS  (STANDING):  aMIOdarone    Tablet 200 milliGRAM(s) Oral daily  apixaban 5 milliGRAM(s) Oral <User Schedule>  atorvastatin 40 milliGRAM(s) Oral at bedtime  clopidogrel Tablet 75 milliGRAM(s) Oral daily  losartan 25 milliGRAM(s) Oral daily  metoprolol succinate ER 25 milliGRAM(s) Oral daily  neomycin/bacitracin/polymyxin Topical Ointment 1 Application(s) Topical two times a day  pantoprazole    Tablet 40 milliGRAM(s) Oral every 12 hours  sodium chloride 0.9%. 1000 milliLiter(s) (50 mL/Hr) IV Continuous <Continuous>  sucralfate suspension 1 Gram(s) Oral every 12 hours    MEDICATIONS  (PRN):  acetaminophen     Tablet .. 650 milliGRAM(s) Oral every 6 hours PRN Moderate Pain (4 - 6)  ALPRAZolam 0.25 milliGRAM(s) Oral every 8 hours PRN anxiety / insomnia  aluminum hydroxide/magnesium hydroxide/simethicone Suspension 30 milliLiter(s) Oral every 4 hours PRN Dyspepsia  benzocaine/menthol Lozenge 1 Lozenge Oral every 2 hours PRN Sore Throat      Allergies    penicillin (Hives; Rash)  azithromycin (Hives; Rash)    Intolerances        Vital Signs Last 24 Hrs  T(C): 36.4 (12 Jul 2024 08:12), Max: 36.7 (11 Jul 2024 19:36)  T(F): 97.5 (12 Jul 2024 08:12), Max: 98 (11 Jul 2024 19:36)  HR: 52 (12 Jul 2024 08:12) (48 - 54)  BP: 167/90 (12 Jul 2024 08:12) (133/64 - 167/90)  BP(mean): --  RR: 18 (12 Jul 2024 08:12) (18 - 18)  SpO2: 98% (12 Jul 2024 08:12) (95% - 99%)    Parameters below as of 12 Jul 2024 08:12  Patient On (Oxygen Delivery Method): room air        Physical Exam:  Constitutional: NAD, AAOx3  Cardiovascular: +S1S2 RRR  Pulmonary: CTA b/l, unlabored  GI: soft NTND +BS  Extremities: no pedal edema, +distal pulses b/l  Neuro: non focal, HEATH x4    LABS:                        12.8   9.56  )-----------( 268      ( 11 Jul 2024 05:38 )             39.4     07-12    136  |  103  |  35.8<H>  ----------------------------<  98  4.3   |  20.0<L>  |  1.39<H>    Ca    8.2<L>      12 Jul 2024 04:30  Mg     1.9     07-11        Urinalysis Basic - ( 12 Jul 2024 04:30 )    Color: x / Appearance: x / SG: x / pH: x  Gluc: 98 mg/dL / Ketone: x  / Bili: x / Urobili: x   Blood: x / Protein: x / Nitrite: x   Leuk Esterase: x / RBC: x / WBC x   Sq Epi: x / Non Sq Epi: x / Bacteria: x      Assement/Plan:    63 y/o female with obesity (BMI 34), HTN, CVA (R-PCA), s/p TNK at [facility] and thrombectomy ([facility] 7/2023), ADORE, cerebral aneurysm s/p embolization (2/2024), and persistent AF (on Eliquis) s/p multiple MIRANDA/DCCV (most recently 5/29/24), who presented today 7/10/24 for and is now POD #1 s/p elective radiofrequency atrial fibrillation ablation (WACA/PVI, CTI, posterior wall, and anterior wall homogenization), access obtained via R sided femoral vein, hemostasis achieved with R groin vascade. Procedure went well and without immediate complications but developed ZIGGY (Cr increased from 0.7 -> peak of 1.6) likely secondary to over diuresis.    Received IVF yesterday and Cr now improving to 1.3. Reports good UO for the past 12 hours following IVF.      - Continue Eliquis 5mg BID  - DO NOT HOLD, INTERRUPT OR REVERSE ANTICOAGULATION WITHOUT EXPLICIT APPROVAL FROM EP SERVICE  - Start Protonix 40mg BID x 2 weeks then daily X 6 weeks.   - Start Carafate 1gm BID x 2 weeks.   - Continue Amiodarone 200mg QD and decreased dose of Toprol 25mg daily  - Pt instructed as to activity limitations - no lifting/pushing/pulling >10 lbs or strenuous exercise x 1 week.   - Pt instructed as to access site care and f/up - written instructions included in d/c documents.  - Cr slightly elevated post-op (peak Cr 1.6) from baseline 0.7. Likely secondary to over diuresis. Cr now improving to 1.39 and normal UO returned.  Renal consult pending  - No barriers to d/c from EP service perspective. Will sign off as to EP. Please call EP service with questions, concerns or further arrhythmia issues.   - Dispo per primary team.

## 2024-07-15 ENCOUNTER — NON-APPOINTMENT (OUTPATIENT)
Age: 64
End: 2024-07-15

## 2024-07-23 ENCOUNTER — APPOINTMENT (OUTPATIENT)
Dept: NEUROLOGY | Facility: CLINIC | Age: 64
End: 2024-07-23
Payer: MEDICARE

## 2024-07-23 VITALS
HEART RATE: 87 BPM | DIASTOLIC BLOOD PRESSURE: 80 MMHG | HEIGHT: 66 IN | BODY MASS INDEX: 36.16 KG/M2 | OXYGEN SATURATION: 99 % | SYSTOLIC BLOOD PRESSURE: 144 MMHG | WEIGHT: 225 LBS

## 2024-07-23 DIAGNOSIS — I67.1 CEREBRAL ANEURYSM, NONRUPTURED: ICD-10-CM

## 2024-07-23 DIAGNOSIS — I63.431 CEREBRAL INFARCTION DUE TO EMBOLISM OF RIGHT POSTERIOR CEREBRAL ARTERY: ICD-10-CM

## 2024-07-23 PROCEDURE — G2211 COMPLEX E/M VISIT ADD ON: CPT

## 2024-07-23 PROCEDURE — 99214 OFFICE O/P EST MOD 30 MIN: CPT

## 2024-07-23 RX ORDER — PANTOPRAZOLE 40 MG/1
40 TABLET, DELAYED RELEASE ORAL
Refills: 0 | Status: ACTIVE | COMMUNITY

## 2024-07-23 RX ORDER — SUCRALFATE 1 G/10ML
1 SUSPENSION ORAL
Refills: 0 | Status: ACTIVE | COMMUNITY

## 2024-07-24 ENCOUNTER — APPOINTMENT (OUTPATIENT)
Dept: FAMILY MEDICINE | Facility: CLINIC | Age: 64
End: 2024-07-24
Payer: MEDICARE

## 2024-07-24 ENCOUNTER — NON-APPOINTMENT (OUTPATIENT)
Age: 64
End: 2024-07-24

## 2024-07-24 VITALS — DIASTOLIC BLOOD PRESSURE: 72 MMHG | SYSTOLIC BLOOD PRESSURE: 148 MMHG

## 2024-07-24 VITALS
SYSTOLIC BLOOD PRESSURE: 180 MMHG | BODY MASS INDEX: 36.48 KG/M2 | DIASTOLIC BLOOD PRESSURE: 80 MMHG | HEART RATE: 65 BPM | RESPIRATION RATE: 16 BRPM | WEIGHT: 227 LBS | OXYGEN SATURATION: 99 % | HEIGHT: 66 IN | TEMPERATURE: 97.8 F

## 2024-07-24 DIAGNOSIS — I48.19 OTHER PERSISTENT ATRIAL FIBRILLATION: ICD-10-CM

## 2024-07-24 DIAGNOSIS — R79.89 OTHER SPECIFIED ABNORMAL FINDINGS OF BLOOD CHEMISTRY: ICD-10-CM

## 2024-07-24 DIAGNOSIS — R79.9 ABNORMAL FINDING OF BLOOD CHEMISTRY, UNSPECIFIED: ICD-10-CM

## 2024-07-24 PROCEDURE — 99495 TRANSJ CARE MGMT MOD F2F 14D: CPT

## 2024-07-24 RX ORDER — METOPROLOL TARTRATE 25 MG/1
25 TABLET, FILM COATED ORAL
Refills: 0 | Status: DISCONTINUED | COMMUNITY
End: 2024-07-24

## 2024-07-24 RX ORDER — METOPROLOL SUCCINATE 25 MG/1
25 TABLET, EXTENDED RELEASE ORAL DAILY
Refills: 0 | Status: ACTIVE | COMMUNITY

## 2024-07-24 NOTE — HISTORY OF PRESENT ILLNESS
[FreeTextEntry1] : Ms. 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   MRA NOVA 6/11/2024 revealed no evidence of residual or recurrent aneurysm. Antegrade flow.   She had repeat cardioversion and cardiac ablation two weeks ago, doing well post procedure. She continues to take Eliquis as directed and Plavix 75 mg daily, tolerating both well. No significant bleeding. She follows with her therapist. Mood stable. Denies recurrent or new TIA/Stroke symptoms. Denies suicidal or homicidal ideation.

## 2024-07-24 NOTE — PHYSICAL EXAM
[No Acute Distress] : no acute distress [Well Nourished] : well nourished [Well Developed] : well developed [Well-Appearing] : well-appearing [Normal Voice/Communication] : normal voice/communication [No Respiratory Distress] : no respiratory distress  [No Accessory Muscle Use] : no accessory muscle use [Clear to Auscultation] : lungs were clear to auscultation bilaterally [Normal Rate] : normal rate  [Regular Rhythm] : with a regular rhythm [Normal S1, S2] : normal S1 and S2 [Coordination Grossly Intact] : coordination grossly intact [de-identified] : she is very tearful

## 2024-07-24 NOTE — HISTORY OF PRESENT ILLNESS
[FreeTextEntry1] : Ms. 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   MRA NOVA 6/11/2024 revealed no evidence of residual or recurrent aneurysm. Antegrade flow.   She had repeat cardioversion and cardiac ablation two weeks ago, doing well post procedure. She continues to take Eliquis as directed and Plavix 75 mg daily, tolerating both well. No significant bleeding. She follows with her therapist. Mood stable. Denies recurrent or new TIA/Stroke symptoms. Denies suicidal or homicidal ideation.

## 2024-07-24 NOTE — HISTORY OF PRESENT ILLNESS
[Admitted on: ___] : The patient was admitted on [unfilled] [Discharged on ___] : discharged on [unfilled] [Post-hospitalization from ___ Hospital] : Post-hospitalization from [unfilled] Hospital [Discharge Summary] : discharge summary [Pertinent Labs] : pertinent labs [Radiology Findings] : radiology findings [Discharge Med List] : discharge medication list [Med Reconciliation] : medication reconciliation has been completed [Patient Contacted By: ____] : and contacted by [unfilled] [FreeTextEntry2] : She was admitted for the cardiac ablation.  Her kidney function was a little abnormal and needs to be followed up on.  The metoprolol dose was lowered.  She has an appointment with Dr Wiggins tomorrow.  She is hoping to return to work on a part time basis.

## 2024-07-24 NOTE — DISCUSSION/SUMMARY
[FreeTextEntry1] : 64-year-old woman with pmHx of HTN, hospitalized at [facility] 7/1/2023 as a transfer from [facility] found to have a right posterior cerebral artery occlusion status post intravenous thrombolysis with tenecteplase and successful TICI3 revascularization with mechanical thrombectomy, newly diagnosed atrial fibrillation anticoagulated with Eliquis, and an incidental, unruptured 5.8 mm multilobulated anterior communicating artery aneurysm s/p embolization 2/8/2024 with flow diversion and coiling.   I have personally reviewed available neuroradiological images. MRA NOVA 6/11/2024 revealed no evidence of residual or recurrent aneurysm. Antegrade flow.  She is complaint on Plavix 75 mg daily and Eliquis 5 mg twice daily with no issues. Plan to continue both medications for now. Plan for 6-month post embo cerebral angiogram next month. Depending on findings, may consider stopping Plavix and continuing on Eliquis only.   I did  the patient about the increased risk of bleeding on concomitant therapy with Eliquis and clopidogrel. She conveys good understanding.  I discussed with the patient the details of diagnostic cerebral angiography. The benefits, alternatives and risks of cerebral angiography were explained in detail including potential ischemic stroke, arterial dissection, vascular access site bleeding or infection, contrast reaction, and long term effects of radiation exposure. No guarantees for success of the procedure was made. The patient was in agreement with the plan and conveyed good understanding.  She will also continue atorvastatin 20 mg daily for secondary stroke prevention.   Neurologically she remains intact. She has residual fatigue, poor endurance and continues to deal with depression and anxiety post stroke. She follows with her therapist weekly. She never started Sertraline. Plan to not start as she is on amiodarone and concomitant use with Sertraline can increase the risk of an arrhythmia.  Patient was educated about signs and symptoms of stroke and was counseled to contact 911 upon emergence of stroke-like symptoms.   PLAN: 1. Continue Eliquis 5mg BID 2. Continue atorvastatin 20mg at bedtime. 3. Continue Plavix 75 mg daily for now 4. Repeat diagnostic cerebral angiogram - Plan for August 29, 2024 5. She may return to work as long as she is cleared by cardiology - will call the office if she needs a clearance letter from Neurology as well   Follow up after repeat cerebral angiogram.  All of the patient's questions and concerns were addressed.

## 2024-07-24 NOTE — PLAN
[FreeTextEntry1] : She was given emotional support today.  I reviewed the hospital discharge summary and gave her emotional support today.  She will follow up with me in 3 months and with her specialists as scheduled.

## 2024-07-24 NOTE — DISCUSSION/SUMMARY
[FreeTextEntry1] : 64-year-old woman with pmHx of HTN, hospitalized at [facility] 7/1/2023 as a transfer from [facility] found to have a right posterior cerebral artery occlusion status post intravenous thrombolysis with tenecteplase and successful TICI3 revascularization with mechanical thrombectomy, newly diagnosed atrial fibrillation anticoagulated with Eliquis, and an incidental, unruptured 5.8 mm multilobulated anterior communicating artery aneurysm s/p embolization 2/8/2024 with flow diversion and coiling.   I have personally reviewed available neuroradiological images. MRA NOVA 6/11/2024 revealed no evidence of residual or recurrent aneurysm. Antegrade flow.  She is complaint on Plavix 75 mg daily and Eliquis 5 mg twice daily with no issues. Plan to continue both medications for now. Plan for 6-month post embo cerebral angiogram next month. Depending on findings, may consider stopping Plavix and continuing on Eliquis only.   I did  the patient about the increased risk of bleeding on concomitant therapy with Eliquis and clopidogrel. She conveys good understanding.  I discussed with the patient the details of diagnostic cerebral angiography. The benefits, alternatives and risks of cerebral angiography were explained in detail including potential ischemic stroke, arterial dissection, vascular access site bleeding or infection, contrast reaction, and long term effects of radiation exposure. No guarantees for success of the procedure was made. The patient was in agreement with the plan and conveyed good understanding.  She will also continue atorvastatin 20 mg daily for secondary stroke prevention.   Neurologically she remains intact. She has residual fatigue, poor endurance and continues to deal with depression and anxiety post stroke. She follows with her therapist weekly. She never started Sertraline. Plan to not start as she is on amiodarone and concomitant use with Sertraline can increase the risk of an arrhythmia.  Patient was educated about signs and symptoms of stroke and was counseled to contact 911 upon emergence of stroke-like symptoms.   PLAN: 1. Continue Eliquis 5mg BID 2. Continue atorvastatin 20mg at bedtime. 3. Continue Plavix 75 mg daily for now 4. Repeat diagnostic cerebral angiogram - Plan for August 29, 2024 5. She may return to work as long as she is cleared by cardiology - will call the office if she needs a clearance letter from Neurology as well   Follow up after repeat cerebral angiogram.  All of the patient's questions and concerns were addressed.

## 2024-07-25 ENCOUNTER — RESULT REVIEW (OUTPATIENT)
Age: 64
End: 2024-07-25

## 2024-07-26 NOTE — CARDIOLOGY SUMMARY
[de-identified] : 7/27/23- Afib 79, normal axis, QTc 417 10/10/23- Afib 98, poor R-wave progression QTc 460 1/5/24- Sinus 50, normal axis, no ST changes, QTc 419 [de-identified] : 8/10/23- PNST- no ischemia/infarct, no TID (ratio 1.04), LV EF 63% [de-identified] : 7/3/23- Summary:\par   1. Technically difficult study.\par   2. Endocardial visualization was enhanced with intravenous echo contrast.\par   3. Normal left ventricular internal cavity size.\par   4. Normal global left ventricular systolic function.\par   5. Left ventricular ejection fraction, by visual estimation, is 60 to \par  65%.\par   6. Moderately enlarged left atrium.\par   7. Normal right atrial size.\par   8. Sclerotic aortic valve with normal opening.\par   9. Mild thickening and calcification of the anterior and posterior \par  mitral valve leaflets.\par  10. Mild mitral valve regurgitation.\par  11. Mild to moderate mitral annular calcification.\par  12. Mild-moderate tricuspid regurgitation.\par  13. Color flow doppler and intravenous injection of agitated saline is \par  limited due the study quality.\par  14. Trivial pericardial effusion.

## 2024-07-26 NOTE — DISCUSSION/SUMMARY
[FreeTextEntry1] : 63F h/o obesity (BMI 37), HTN, found with acute CVA (R-PCA) s/p TNK at [facility] and transferred to [facility] 7/1/23 for thrombectomy and noted to be in Afib RVR, asymptomatic likely silent Afib for few months, TTE LV EF 60-65% and moderate LA enlargement, difficult to rate control required high dose metoprolol 100mg BID and diltiazem 360mg, CT head with diffuse embolic infarcts, brain MRI with petechial hemorrhage, CHADSVasc 3-4, challenged on heparin gtt with stable CT head and transition to Eliquis 5mg BID, defer rhythm control until able to tolerate long term AC, outpatient MIRANDA/CV and consider future elective ablation, seen by EP/Dr. Wiggins, need outpatient sleep study, prior visit 7/2023 had pharm nuclear stress test no ischemia/infarct, last seen 10/2023, interval add elective MIRANDA/CV 12/13/23 at [facility], diagnostic cerebral angiogram 2/9/24 with coil embolization/stent of Acomm aneurysm, presents for general cardiology follow up.   Overall symptoms of fatigue improved after restoration of sinus rhythm advised to follow up with EP/Dr. Wiggins for elective pAfib ablation, still not yet done sleep study. BP uncontrolled despite on high dose AV lanette blockers, will add losartan.     1. Persistent Afib s/p MIRANDA/CV now in sinus -to follow up with EP/Dr. Wiggins and eventual ablation -rate controlled on high dose metoprolol and diltiazem, if sinus chava 50s then need to reduce metoprolol dose -CHADVasc 5 with embolic stroke- continue Eliquis and need bridging when off med for any procedure.   2. HTN -BP not at goal on current AV lanette blockers, add losartan 25mg and uptitrate for goal BP <140/90  -recent LDL 91, remains on atorvastatin 40mg   3. Embolic CVA with petechial hemorrhage, cerebral aneurysm s/p coiling -continue clopidogrel, follow up with neurosurgery.   4. Obesity -need weight loss -pending sleep study.   5. Anxiety -continue follow up with psychotherapist.     Follow up in 6 months.

## 2024-07-26 NOTE — HISTORY OF PRESENT ILLNESS
[FreeTextEntry1] : 63F h/o obesity (BMI 37), HTN, found with acute CVA (R-PCA) s/p TNK at [facility] and transferred to [facility] 7/1/23 for thrombectomy and noted to be in Afib RVR, asymptomatic likely silent Afib for few months, TTE LV EF 60-65% and moderate LA enlargement, difficult to rate control required high dose metoprolol 100mg BID and diltiazem 360mg, CT head with diffuse embolic infarcts, brain MRI with petechial hemorrhage, CHADSVasc 3-4, challenged on heparin gtt with stable CT head and transition to Eliquis 5mg BID, defer rhythm control until able to tolerate long term AC, outpatient MIRANDA/CV and consider future elective ablation, seen by EP/Dr. Wiggins, need outpatient sleep study, prior visit 7/2023 had pharm nuclear stress test no ischemia/infarct, last seen 10/2023, interval add elective MIRANDA/CV 12/13/23 at [facility], diagnostic cerebral angiogram 2/9/24 with coil embolization/stent of Acomm aneurysm, interval recurrent Afib s/p MIRANDA/DCCV on 5/29/24 and also underwent elective Afib ablation with Dr. Wiggins 7/10/24, presents for general cardiology follow up.   AF: - S/p AF ablation (RF PVI + PWI + CTI + anterior wall homogenization (MA-LSPV, MA-RSPV)) on 7/10/24 - Continue Metoprolol Succinate 25mg daily - Continue Apixaban 5mg BID for CHADS2-VASc of  5 (HTN, Agex1, CVA, Female) - Continue Amiodarone 200mg daily until 9/10/24, then discontinue HTN: - Continue Metoprolol 25mg daily - Increase Losartan to 50mg daily - Repeat BMP in 1 week with PCP   Prior visit 2/2024:  Reports feeling well, no cardiac complains, fatigue has improved since cardioversion, seeing psychotherapist and still emotional at times since her stroke event. Has not been checking home BP, took metoprolol and diltiazem this morning, has no plan for elective Afib ablation, denies bleeding on Eliquis and clopidogrel for cerebral stent. 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   Prior visit 10/2023:  Recently seen by neurosurgery may need eventual cerebral angiogram for 4 mm AComm aneurysm.   Reports feeling fatigue and exertional shortness of breath at times, adherent with Eliquis, no bleeding or fall. Pending sleep study evaluation this Thursday. Seeing a psychotherapist for anxiety/depression, she is now more aware to needs to take time to take care of herself first.    Prior visit 7/2023: Reports feeling well except has been feeling fatigue, denies chest pain or shortness of breath with exertion, no palpitations. Tolerating Eliquis use without bleeding or fall. 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 Has been anxious since her episode of hospitalization, remains out of work. Pending to see EP/Dr. Wiggins for follow up. 
24-Jan-2022 11:27

## 2024-08-01 ENCOUNTER — NON-APPOINTMENT (OUTPATIENT)
Age: 64
End: 2024-08-01

## 2024-08-06 ENCOUNTER — NON-APPOINTMENT (OUTPATIENT)
Age: 64
End: 2024-08-06

## 2024-08-06 ENCOUNTER — APPOINTMENT (OUTPATIENT)
Dept: CARDIOLOGY | Facility: CLINIC | Age: 64
End: 2024-08-06

## 2024-08-06 PROCEDURE — 99214 OFFICE O/P EST MOD 30 MIN: CPT

## 2024-08-06 PROCEDURE — G2211 COMPLEX E/M VISIT ADD ON: CPT

## 2024-08-06 PROCEDURE — 93000 ELECTROCARDIOGRAM COMPLETE: CPT

## 2024-08-06 NOTE — CARDIOLOGY SUMMARY
[de-identified] : 7/27/23- Afib 79, normal axis, QTc 417 10/10/23- Afib 98, poor R-wave progression QTc 460 1/5/24- Sinus 50, normal axis, no ST changes, QTc 419 8/6/24- Sinus 62, normal axis, nonspecific T-wave, QTc 351 [de-identified] : 8/10/23- PNST- no ischemia/infarct, no TID (ratio 1.04), LV EF 63% [de-identified] : 7/3/23- Summary:\par   1. Technically difficult study.\par   2. Endocardial visualization was enhanced with intravenous echo contrast.\par   3. Normal left ventricular internal cavity size.\par   4. Normal global left ventricular systolic function.\par   5. Left ventricular ejection fraction, by visual estimation, is 60 to \par  65%.\par   6. Moderately enlarged left atrium.\par   7. Normal right atrial size.\par   8. Sclerotic aortic valve with normal opening.\par   9. Mild thickening and calcification of the anterior and posterior \par  mitral valve leaflets.\par  10. Mild mitral valve regurgitation.\par  11. Mild to moderate mitral annular calcification.\par  12. Mild-moderate tricuspid regurgitation.\par  13. Color flow doppler and intravenous injection of agitated saline is \par  limited due the study quality.\par  14. Trivial pericardial effusion.

## 2024-08-06 NOTE — CARDIOLOGY SUMMARY
[de-identified] : 7/27/23- Afib 79, normal axis, QTc 417 10/10/23- Afib 98, poor R-wave progression QTc 460 1/5/24- Sinus 50, normal axis, no ST changes, QTc 419 8/6/24- Sinus 62, normal axis, nonspecific T-wave, QTc 351 [de-identified] : 8/10/23- PNST- no ischemia/infarct, no TID (ratio 1.04), LV EF 63% [de-identified] : 7/3/23- Summary:\par   1. Technically difficult study.\par   2. Endocardial visualization was enhanced with intravenous echo contrast.\par   3. Normal left ventricular internal cavity size.\par   4. Normal global left ventricular systolic function.\par   5. Left ventricular ejection fraction, by visual estimation, is 60 to \par  65%.\par   6. Moderately enlarged left atrium.\par   7. Normal right atrial size.\par   8. Sclerotic aortic valve with normal opening.\par   9. Mild thickening and calcification of the anterior and posterior \par  mitral valve leaflets.\par  10. Mild mitral valve regurgitation.\par  11. Mild to moderate mitral annular calcification.\par  12. Mild-moderate tricuspid regurgitation.\par  13. Color flow doppler and intravenous injection of agitated saline is \par  limited due the study quality.\par  14. Trivial pericardial effusion.

## 2024-08-06 NOTE — DISCUSSION/SUMMARY
[FreeTextEntry1] : 63F h/o obesity (BMI 37), HTN, found with acute CVA (R-PCA) s/p TNK at [facility] and transferred to [facility] 7/1/23 for thrombectomy and noted to be in Afib RVR, asymptomatic likely silent Afib for few months, TTE LV EF 60-65% and moderate LA enlargement, difficult to rate control required high dose metoprolol 100mg BID and diltiazem 360mg, CT head with diffuse embolic infarcts, brain MRI with petechial hemorrhage, CHADSVasc 3-4, challenged on heparin gtt with stable CT head and transition to Eliquis 5mg BID, defer rhythm control until able to tolerate long term AC, outpatient MIRANDA/CV and consider future elective ablation, seen by EP/Dr. Wiggins, need outpatient sleep study, prior visit 7/2023 had pharm nuclear stress test no ischemia/infarct, last seen 10/2023, interval add elective MIRANDA/CV 12/13/23 at [facility], diagnostic cerebral angiogram 2/9/24 with coil embolization/stent of Acomm aneurysm, interval recurrent Afib s/p MIRANDA/DCCV on 5/29/24 and also underwent elective Afib ablation with Dr. Wiggins 7/10/24, presents for general cardiology follow up.   Overall symptomatic improved since remains in sinus rhythm, still not yet done sleep study. BP uncontrolled despite on losartan 50mg will increase to 100mg and advised to check home BP. No cardiac contraindication to return to work.     1. Persistent Afib s/p MIRANDA/CV and RF ablation now in sinus AF: - S/p AF ablation (RF PVI + PWI + CTI + anterior wall homogenization (MA-LSPV, MA-RSPV)) on 7/10/24 - Continue Metoprolol Succinate 25mg daily - Continue Amiodarone 200mg daily until 9/10/24, then discontinue -CHADVasc 5 with embolic stroke- continue Eliquis and need bridging when off med for any procedure.   2. HTN -BP not at goal on osartan 50mg and uptitrate to 100mg for goal BP <140/90  -recent LDL 91, remains on atorvastatin 40mg   3. Embolic CVA with petechial hemorrhage, cerebral aneurysm s/p coiling -continue clopidogrel per neurosurgery.   4. Obesity -need weight loss -pending sleep study.   5. Anxiety, PTSD -continue follow up with psychotherapist.     Follow up in 3 months.  [EKG obtained to assist in diagnosis and management of assessed problem(s)] : EKG obtained to assist in diagnosis and management of assessed problem(s)

## 2024-08-06 NOTE — DISCUSSION/SUMMARY
[FreeTextEntry1] : 63F h/o obesity (BMI 37), HTN, found with acute CVA (R-PCA) s/p TNK at [facility] and transferred to [facility] 7/1/23 for thrombectomy and noted to be in Afib RVR, asymptomatic likely silent Afib for few months, TTE LV EF 60-65% and moderate LA enlargement, difficult to rate control required high dose metoprolol 100mg BID and diltiazem 360mg, CT head with diffuse embolic infarcts, brain MRI with petechial hemorrhage, CHADSVasc 3-4, challenged on heparin gtt with stable CT head and transition to Eliquis 5mg BID, defer rhythm control until able to tolerate long term AC, outpatient MIRANDA/CV and consider future elective ablation, seen by EP/Dr. Wiggins, need outpatient sleep study, prior visit 7/2023 had pharm nuclear stress test no ischemia/infarct, last seen 10/2023, interval add elective MIRANDA/CV 12/13/23 at [facility], diagnostic cerebral angiogram 2/9/24 with coil embolization/stent of Acomm aneurysm, interval recurrent Afib s/p MIRANDA/DCCV on 5/29/24 and also underwent elective Afib ablation with Dr. Wiggins 7/10/24, presents for general cardiology follow up.   Overall symptomatic improved since remains in sinus rhythm, still not yet done sleep study. BP uncontrolled despite on losartan 50mg will increase to 100mg and advised to check home BP. No cardiac contraindication to return to work.     1. Persistent Afib s/p MIRANDA/CV and RF ablation now in sinus AF: - S/p AF ablation (RF PVI + PWI + CTI + anterior wall homogenization (MA-LSPV, MA-RSPV)) on 7/10/24 - Continue Metoprolol Succinate 25mg daily - Continue Amiodarone 200mg daily until 9/10/24, then discontinue -CHADVasc 5 with embolic stroke- continue Eliquis and need bridging when off med for any procedure.   2. HTN -BP not at goal on osartan 50mg and uptitrate to 100mg for goal BP <140/90  -recent LDL 91, remains on atorvastatin 40mg   3. Embolic CVA with petechial hemorrhage, cerebral aneurysm s/p coiling -continue clopidogrel per neurosurgery.   4. Obesity -need weight loss -pending sleep study.   5. Anxiety, PTSD -continue follow up with psychotherapist.     Follow up in 3 months.  [EKG obtained to assist in diagnosis and management of assessed problem(s)] : EKG obtained to assist in diagnosis and management of assessed problem(s)

## 2024-08-06 NOTE — DISCUSSION/SUMMARY
[FreeTextEntry1] : 63F h/o obesity (BMI 37), HTN, found with acute CVA (R-PCA) s/p TNK at [facility] and transferred to [facility] 7/1/23 for thrombectomy and noted to be in Afib RVR, asymptomatic likely silent Afib for few months, TTE LV EF 60-65% and moderate LA enlargement, difficult to rate control required high dose metoprolol 100mg BID and diltiazem 360mg, CT head with diffuse embolic infarcts, brain MRI with petechial hemorrhage, CHADSVasc 3-4, challenged on heparin gtt with stable CT head and transition to Eliquis 5mg BID, defer rhythm control until able to tolerate long term AC, outpatient MIRANDA/CV and consider future elective ablation, seen by EP/Dr. Wiggins, need outpatient sleep study, prior visit 7/2023 had pharm nuclear stress test no ischemia/infarct, last seen 10/2023, interval add elective MIRANDA/CV 12/13/23 at [facility], diagnostic cerebral angiogram 2/9/24 with coil embolization/stent of Acomm aneurysm, interval recurrent Afib s/p MIRANDA/DCCV on 5/29/24 and also underwent elective Afib ablation with Dr. Wiggins 7/10/24, presents for general cardiology follow up.   Overall symptomatic improved since remains in sinus rhythm, still not yet done sleep study. BP uncontrolled despite on losartan 50mg will increase to 100mg and advised to check home BP. No cardiac contraindication to return to work.     1. Persistent Afib s/p MIRANDA/CV and RF ablation now in sinus AF: - S/p AF ablation (RF PVI + PWI + CTI + anterior wall homogenization (MA-LSPV, MA-RSPV)) on 7/10/24 - Continue Metoprolol Succinate 25mg daily - Continue Amiodarone 200mg daily until 9/10/24, then discontinue -CHADVasc 5 with embolic stroke- continue Eliquis and need bridging when off med for any procedure.   2. HTN -BP not at goal on osartan 50mg and uptitrate to 100mg for goal BP <140/90  -recent LDL 91, remains on atorvastatin 40mg   3. Embolic CVA with petechial hemorrhage, cerebral aneurysm s/p coiling -continue clopidogrel per neurosurgery.   4. Obesity -need weight loss -pending sleep study.   5. Anxiety, PTSD -continue follow up with psychotherapist.     Follow up in 3 months.  [EKG obtained to assist in diagnosis and management of assessed problem(s)] : EKG obtained to assist in diagnosis and management of assessed problem(s)

## 2024-08-06 NOTE — CARDIOLOGY SUMMARY
[de-identified] : 7/27/23- Afib 79, normal axis, QTc 417 10/10/23- Afib 98, poor R-wave progression QTc 460 1/5/24- Sinus 50, normal axis, no ST changes, QTc 419 8/6/24- Sinus 62, normal axis, nonspecific T-wave, QTc 351 [de-identified] : 8/10/23- PNST- no ischemia/infarct, no TID (ratio 1.04), LV EF 63% [de-identified] : 7/3/23- Summary:\par   1. Technically difficult study.\par   2. Endocardial visualization was enhanced with intravenous echo contrast.\par   3. Normal left ventricular internal cavity size.\par   4. Normal global left ventricular systolic function.\par   5. Left ventricular ejection fraction, by visual estimation, is 60 to \par  65%.\par   6. Moderately enlarged left atrium.\par   7. Normal right atrial size.\par   8. Sclerotic aortic valve with normal opening.\par   9. Mild thickening and calcification of the anterior and posterior \par  mitral valve leaflets.\par  10. Mild mitral valve regurgitation.\par  11. Mild to moderate mitral annular calcification.\par  12. Mild-moderate tricuspid regurgitation.\par  13. Color flow doppler and intravenous injection of agitated saline is \par  limited due the study quality.\par  14. Trivial pericardial effusion.

## 2024-08-06 NOTE — HISTORY OF PRESENT ILLNESS
[FreeTextEntry1] : 63F h/o obesity (BMI 37), HTN, found with acute CVA (R-PCA) s/p TNK at [facility] and transferred to [facility] 7/1/23 for thrombectomy and noted to be in Afib RVR, asymptomatic likely silent Afib for few months, TTE LV EF 60-65% and moderate LA enlargement, difficult to rate control required high dose metoprolol 100mg BID and diltiazem 360mg, CT head with diffuse embolic infarcts, brain MRI with petechial hemorrhage, CHADSVasc 3-4, challenged on heparin gtt with stable CT head and transition to Eliquis 5mg BID, defer rhythm control until able to tolerate long term AC, outpatient MIRANDA/CV and consider future elective ablation, seen by EP/Dr. Wiggins, need outpatient sleep study, prior visit 7/2023 had pharm nuclear stress test no ischemia/infarct, last seen 10/2023, interval add elective MIRANDA/CV 12/13/23 at [facility], diagnostic cerebral angiogram 2/9/24 with coil embolization/stent of Acomm aneurysm, interval recurrent Afib s/p MIRANDA/DCCV on 5/29/24 and also underwent elective Afib ablation with Dr. Wiggins 7/10/24, presents for general cardiology follow up.   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 Denies bleeding/fall with Eliquis use except easy bruising.    Prior visit 2/2024:  Reports feeling well, no cardiac complains, fatigue has improved since cardioversion, seeing psychotherapist and still emotional at times since her stroke event. Has not been checking home BP, took metoprolol and diltiazem this morning, has no plan for elective Afib ablation, denies bleeding on Eliquis and clopidogrel for cerebral stent. 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   Prior visit 10/2023:  Recently seen by neurosurgery may need eventual cerebral angiogram for 4 mm AComm aneurysm.   Reports feeling fatigue and exertional shortness of breath at times, adherent with Eliquis, no bleeding or fall. Pending sleep study evaluation this Thursday. Seeing a psychotherapist for anxiety/depression, she is now more aware to needs to take time to take care of herself first.    Prior visit 7/2023: Reports feeling well except has been feeling fatigue, denies chest pain or shortness of breath with exertion, no palpitations. Tolerating Eliquis use without bleeding or fall. 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 Has been anxious since her episode of hospitalization, remains out of work. Pending to see EP/Dr. Wiggins for follow up.

## 2024-08-06 NOTE — HISTORY OF PRESENT ILLNESS
[FreeTextEntry1] : 63F h/o obesity (BMI 37), HTN, found with acute CVA (R-PCA) s/p TNK at [facility] and transferred to [facility] 7/1/23 for thrombectomy and noted to be in Afib RVR, asymptomatic likely silent Afib for few months, TTE LV EF 60-65% and moderate LA enlargement, difficult to rate control required high dose metoprolol 100mg BID and diltiazem 360mg, CT head with diffuse embolic infarcts, brain MRI with petechial hemorrhage, CHADSVasc 3-4, challenged on heparin gtt with stable CT head and transition to Eliquis 5mg BID, defer rhythm control until able to tolerate long term AC, outpatient MIRANDA/CV and consider future elective ablation, seen by EP/Dr. Wiggins, need outpatient sleep study, prior visit 7/2023 had pharm nuclear stress test no ischemia/infarct, last seen 10/2023, interval add elective MIRANDA/CV 12/13/23 at [facility], diagnostic cerebral angiogram 2/9/24 with coil embolization/stent of Acomm aneurysm, interval recurrent Afib s/p MIRANDA/DCCV on 5/29/24 and also underwent elective Afib ablation with Dr. Wiggins 7/10/24, presents for general cardiology follow up.   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 Denies bleeding/fall with Eliquis use except easy bruising.    Prior visit 2/2024:  Reports feeling well, no cardiac complains, fatigue has improved since cardioversion, seeing psychotherapist and still emotional at times since her stroke event. Has not been checking home BP, took metoprolol and diltiazem this morning, has no plan for elective Afib ablation, denies bleeding on Eliquis and clopidogrel for cerebral stent. 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   Prior visit 10/2023:  Recently seen by neurosurgery may need eventual cerebral angiogram for 4 mm AComm aneurysm.   Reports feeling fatigue and exertional shortness of breath at times, adherent with Eliquis, no bleeding or fall. Pending sleep study evaluation this Thursday. Seeing a psychotherapist for anxiety/depression, she is now more aware to needs to take time to take care of herself first.    Prior visit 7/2023: Reports feeling well except has been feeling fatigue, denies chest pain or shortness of breath with exertion, no palpitations. Tolerating Eliquis use without bleeding or fall. 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 Has been anxious since her episode of hospitalization, remains out of work. Pending to see EP/Dr. Wiggins for follow up.

## 2024-08-06 NOTE — HISTORY OF PRESENT ILLNESS
[FreeTextEntry1] : 63F h/o obesity (BMI 37), HTN, found with acute CVA (R-PCA) s/p TNK at [facility] and transferred to [facility] 7/1/23 for thrombectomy and noted to be in Afib RVR, asymptomatic likely silent Afib for few months, TTE LV EF 60-65% and moderate LA enlargement, difficult to rate control required high dose metoprolol 100mg BID and diltiazem 360mg, CT head with diffuse embolic infarcts, brain MRI with petechial hemorrhage, CHADSVasc 3-4, challenged on heparin gtt with stable CT head and transition to Eliquis 5mg BID, defer rhythm control until able to tolerate long term AC, outpatient MIRANDA/CV and consider future elective ablation, seen by EP/Dr. Wiggins, need outpatient sleep study, prior visit 7/2023 had pharm nuclear stress test no ischemia/infarct, last seen 10/2023, interval add elective MIRANDA/CV 12/13/23 at [facility], diagnostic cerebral angiogram 2/9/24 with coil embolization/stent of Acomm aneurysm, interval recurrent Afib s/p MIRANDA/DCCV on 5/29/24 and also underwent elective Afib ablation with Dr. Wiggins 7/10/24, presents for general cardiology follow up.   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 Denies bleeding/fall with Eliquis use except easy bruising.    Prior visit 2/2024:  Reports feeling well, no cardiac complains, fatigue has improved since cardioversion, seeing psychotherapist and still emotional at times since her stroke event. Has not been checking home BP, took metoprolol and diltiazem this morning, has no plan for elective Afib ablation, denies bleeding on Eliquis and clopidogrel for cerebral stent. 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   Prior visit 10/2023:  Recently seen by neurosurgery may need eventual cerebral angiogram for 4 mm AComm aneurysm.   Reports feeling fatigue and exertional shortness of breath at times, adherent with Eliquis, no bleeding or fall. Pending sleep study evaluation this Thursday. Seeing a psychotherapist for anxiety/depression, she is now more aware to needs to take time to take care of herself first.    Prior visit 7/2023: Reports feeling well except has been feeling fatigue, denies chest pain or shortness of breath with exertion, no palpitations. Tolerating Eliquis use without bleeding or fall. 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 Has been anxious since her episode of hospitalization, remains out of work. Pending to see EP/Dr. Wiggins for follow up.

## 2024-08-23 ENCOUNTER — APPOINTMENT (OUTPATIENT)
Dept: FAMILY MEDICINE | Facility: CLINIC | Age: 64
End: 2024-08-23
Payer: MEDICARE

## 2024-08-23 VITALS
BODY MASS INDEX: 36.48 KG/M2 | DIASTOLIC BLOOD PRESSURE: 86 MMHG | WEIGHT: 227 LBS | OXYGEN SATURATION: 98 % | SYSTOLIC BLOOD PRESSURE: 146 MMHG | TEMPERATURE: 97.8 F | HEIGHT: 66 IN | HEART RATE: 71 BPM | RESPIRATION RATE: 14 BRPM

## 2024-08-23 DIAGNOSIS — Z01.818 ENCOUNTER FOR OTHER PREPROCEDURAL EXAMINATION: ICD-10-CM

## 2024-08-23 DIAGNOSIS — H65.03 ACUTE SEROUS OTITIS MEDIA, BILATERAL: ICD-10-CM

## 2024-08-23 PROCEDURE — G2211 COMPLEX E/M VISIT ADD ON: CPT

## 2024-08-23 PROCEDURE — 99213 OFFICE O/P EST LOW 20 MIN: CPT

## 2024-08-23 RX ORDER — DOXYCYCLINE 100 MG/1
100 CAPSULE ORAL
Qty: 14 | Refills: 0 | Status: ACTIVE | COMMUNITY
Start: 2024-08-23 | End: 1900-01-01

## 2024-08-23 NOTE — HISTORY OF PRESENT ILLNESS
[FreeTextEntry8] : patient presents for ear pain mostly in the right ear, yesterday had some body aches and ear pain, pain in nose, throat a little sore.  She started working again on Monday.  Two nights ago she started feeling pain in her throat and in both ears, worse in the right ear.  She hasn't had any nasal congestion, fever or cough.  She didn't test for Covid.  She felt flushed yesterday and took Tylenol.

## 2024-08-23 NOTE — PHYSICAL EXAM
[No Acute Distress] : no acute distress [Well Nourished] : well nourished [Well Developed] : well developed [Well-Appearing] : well-appearing [Normal Voice/Communication] : normal voice/communication [Normal Sclera/Conjunctiva] : normal sclera/conjunctiva [Supple] : supple [No Respiratory Distress] : no respiratory distress  [No Accessory Muscle Use] : no accessory muscle use [Clear to Auscultation] : lungs were clear to auscultation bilaterally [Normal Rate] : normal rate  [Regular Rhythm] : with a regular rhythm [Normal S1, S2] : normal S1 and S2 [Normal Mood] : the mood was normal [de-identified] : TM's dull bilaterally with mild erythema, mild erythema of posterior pharynx [de-identified] : shotty right submandibular adenopathy

## 2024-08-23 NOTE — REVIEW OF SYSTEMS
[Fatigue] : fatigue [Earache] : earache [Nasal Discharge] : no nasal discharge [Sore Throat] : sore throat [Postnasal Drip] : no postnasal drip [Shortness Of Breath] : no shortness of breath [Wheezing] : no wheezing [Cough] : no cough [Negative] : Heme/Lymph

## 2024-08-23 NOTE — PLAN
[FreeTextEntry1] : She was advised to take doxycycline 100 mg bid for 7 days and to follow up if symptoms persist or worsen.

## 2024-08-29 ENCOUNTER — APPOINTMENT (OUTPATIENT)
Dept: NEUROLOGY | Facility: HOSPITAL | Age: 64
End: 2024-08-29

## 2024-09-04 NOTE — PATIENT PROFILE ADULT - FUNCTIONAL SCREEN CURRENT LEVEL: SWALLOWING (IF SCORE 2 OR MORE FOR ANY ITEM, CONSULT REHAB SERVICES), MLM)
HPI:     Iva Carpenter is a 70 y.o. female who was referred to pulmonary clinic for evaluation.       Has been told that she has COPD and asthma in the past.  Has been on multiple inhalers and nebulizers in the past.  Has not been seen by pulmonologist.  Did not have a pulmonary function test in the past.    Current symptoms-  SOB with minimal activities. Cough which is dry.  Has some wheezing with activities as well.    Takes nebulizer twice a day. Uses Spiriva 2 puffs daily. But does not have persistent compliance with inhalers due to being expensive.  She has been using samples here and there.     Has been on Prednisone for 3 months due to rheumatoid arthritis and felt her breathing was better.  Breathing has been worse since she stopped the prednisone.    She has been using a CPAP for years although not formally diagnosed with HARINDER.  She has witnessed apneas and therefore she started using her 's machine and was getting supplies from the Internet.       12/11/23:    She comes back for follow-up.  She underwent a sleep study that showed severe obstructive sleep apnea.  Overall AHI was 84.  She had also a PFT that showed moderate obstructive ventilatory defect with significant response of bronchodilators.    Had a CT chest that I reviewed personally and interpreted the results independently.  CT chest shows subtle emphysema.  There is no significant nodules.  Shortness of breath is better with LAMA/LABA.  Insurance does not cover her prescribed medication.        3/11/24:    Comes back for follow-up.  Had a CPAP titration study that showed a pressure of 13 cm H2O was adequate.  Has been on CPAP for 5 weeks.  Has been doing really good.  Compliance is great.  Averaging 8 to 9 hours every night.  Feels more refreshed.  Respiratory status has been better as well.  Continues to be on trelogy.  Has been having a bit of thrush and asking for nystatin.  Having difficulty rinsing her mouth every time 
0 = swallows foods/liquids without difficulty

## 2024-09-25 ENCOUNTER — APPOINTMENT (OUTPATIENT)
Dept: FAMILY MEDICINE | Facility: CLINIC | Age: 64
End: 2024-09-25
Payer: MEDICARE

## 2024-09-25 VITALS
BODY MASS INDEX: 35.84 KG/M2 | TEMPERATURE: 97.1 F | WEIGHT: 223 LBS | OXYGEN SATURATION: 95 % | DIASTOLIC BLOOD PRESSURE: 80 MMHG | RESPIRATION RATE: 14 BRPM | SYSTOLIC BLOOD PRESSURE: 150 MMHG | HEIGHT: 66 IN | HEART RATE: 70 BPM

## 2024-09-25 DIAGNOSIS — R68.89 OTHER GENERAL SYMPTOMS AND SIGNS: ICD-10-CM

## 2024-09-25 DIAGNOSIS — Z92.29 PERSONAL HISTORY OF OTHER DRUG THERAPY: ICD-10-CM

## 2024-09-25 DIAGNOSIS — H65.03 ACUTE SEROUS OTITIS MEDIA, BILATERAL: ICD-10-CM

## 2024-09-25 PROCEDURE — G2211 COMPLEX E/M VISIT ADD ON: CPT

## 2024-09-25 PROCEDURE — 99213 OFFICE O/P EST LOW 20 MIN: CPT

## 2024-09-25 NOTE — PLAN
[FreeTextEntry1] : She was advised to have further testing done and I ordered a nasal swab to rule out RSV, influenza and Covid.  She was advised not to take any NSAIDs as she is on blood thinners but she may take acetaminophen and Mucinex as needed. She was provided with a letter to stay out of work until 9/30.  She will follow up for any worsening symptoms.

## 2024-09-25 NOTE — REVIEW OF SYSTEMS
[Chills] : chills [Fatigue] : fatigue [Nasal Discharge] : nasal discharge [Sore Throat] : sore throat [Postnasal Drip] : postnasal drip [Shortness Of Breath] : no shortness of breath [Wheezing] : no wheezing [Cough] : cough [Muscle Pain] : muscle pain [Negative] : Heme/Lymph

## 2024-09-25 NOTE — HISTORY OF PRESENT ILLNESS
[FreeTextEntry8] : Patient presents as feeling lousy, has a cough, has aches, pains, nauseous, sweating hot and cold. Patient hasn't been eating. Patient did encounter two patients at work with covid, and patients positive for flu as well. Patient also feels fatigued. This has been going on for about a week.  She didn't check her temperature.  She didn't test herself for Covid.  She is taking Tylenol as needed.

## 2024-09-25 NOTE — PHYSICAL EXAM
[No Acute Distress] : no acute distress [Well Nourished] : well nourished [Well Developed] : well developed [Normal Voice/Communication] : normal voice/communication [Ill-Appearing] : ill-appearing [Normal Sclera/Conjunctiva] : normal sclera/conjunctiva [Normal Outer Ear/Nose] : the outer ears and nose were normal in appearance [Normal Oropharynx] : the oropharynx was normal [Normal TMs] : both tympanic membranes were normal [No Lymphadenopathy] : no lymphadenopathy [Supple] : supple [No Respiratory Distress] : no respiratory distress  [No Accessory Muscle Use] : no accessory muscle use [Clear to Auscultation] : lungs were clear to auscultation bilaterally [Normal Rate] : normal rate  [Regular Rhythm] : with a regular rhythm [Normal S1, S2] : normal S1 and S2 [Normal Mood] : the mood was normal

## 2024-09-26 DIAGNOSIS — U07.1 COVID-19: ICD-10-CM

## 2024-09-26 LAB
INFLUENZA A RESULT: DETECTED
INFLUENZA B RESULT: NOT DETECTED
RESP SYN VIRUS RESULT: NOT DETECTED
SARS-COV-2 RESULT: DETECTED

## 2024-09-26 RX ORDER — BENZONATATE 100 MG/1
100 CAPSULE ORAL
Qty: 30 | Refills: 1 | Status: ACTIVE | COMMUNITY
Start: 2024-09-26 | End: 1900-01-01

## 2024-10-17 ENCOUNTER — APPOINTMENT (OUTPATIENT)
Dept: NEUROLOGY | Facility: HOSPITAL | Age: 64
End: 2024-10-17

## 2024-10-22 ENCOUNTER — APPOINTMENT (OUTPATIENT)
Dept: NEUROLOGY | Facility: CLINIC | Age: 64
End: 2024-10-22

## 2024-10-22 ENCOUNTER — APPOINTMENT (OUTPATIENT)
Dept: FAMILY MEDICINE | Facility: CLINIC | Age: 64
End: 2024-10-22

## 2024-11-12 ENCOUNTER — APPOINTMENT (OUTPATIENT)
Dept: CARDIOLOGY | Facility: CLINIC | Age: 64
End: 2024-11-12

## 2024-11-26 ENCOUNTER — APPOINTMENT (OUTPATIENT)
Dept: NEUROLOGY | Facility: CLINIC | Age: 64
End: 2024-11-26

## 2024-12-09 ENCOUNTER — RX RENEWAL (OUTPATIENT)
Age: 64
End: 2024-12-09

## 2024-12-16 ENCOUNTER — RX RENEWAL (OUTPATIENT)
Age: 64
End: 2024-12-16

## 2024-12-30 ENCOUNTER — NON-APPOINTMENT (OUTPATIENT)
Age: 64
End: 2024-12-30

## 2025-01-24 ENCOUNTER — RX RENEWAL (OUTPATIENT)
Age: 65
End: 2025-01-24

## 2025-03-29 NOTE — H&P PST ADULT - POSTERIOR CERVICAL R
4 Eyes Skin Assessment     NAME:  Jes Lira  YOB: 1972  MEDICAL RECORD NUMBER:  [id number]    The patient is being assessed for  Admission    I agree that at least one RN has performed a thorough Head to Toe Skin Assessment on the patient. ALL assessment sites listed below have been assessed.      Areas assessed by both nurses:    Head, Face, Ears, Shoulders, Back, Chest, Arms, Elbows, Hands, Sacrum. Buttock, Coccyx, Ischium, Legs. Feet and Heels, and Under Medical Devices         Does the Patient have a Wound? No noted wound(s)       Jose Prevention initiated by RN: No  Wound Care Orders initiated by RN: No    Pressure Injury (Stage 3,4, Unstageable, DTI, NWPT, and Complex wounds) if present, place Wound referral order by RN under : Yes    New Ostomies, if present place, Ostomy referral order under : No     Nurse 1 eSignature: Electronically signed by ANNAMARIA ERICKSON RN on 3/29/25 at 12:06 AM EDT    **SHARE this note so that the co-signing nurse can place an eSignature**    Nurse 2 eSignature: {Esignature:[id number]}    normal

## 2025-05-07 ENCOUNTER — APPOINTMENT (OUTPATIENT)
Dept: CARDIOLOGY | Facility: CLINIC | Age: 65
End: 2025-05-07
Payer: MEDICARE

## 2025-05-07 ENCOUNTER — NON-APPOINTMENT (OUTPATIENT)
Age: 65
End: 2025-05-07

## 2025-05-07 VITALS — SYSTOLIC BLOOD PRESSURE: 180 MMHG | DIASTOLIC BLOOD PRESSURE: 90 MMHG

## 2025-05-07 VITALS
SYSTOLIC BLOOD PRESSURE: 181 MMHG | HEART RATE: 81 BPM | DIASTOLIC BLOOD PRESSURE: 91 MMHG | OXYGEN SATURATION: 98 % | BODY MASS INDEX: 36.64 KG/M2 | WEIGHT: 228 LBS | HEIGHT: 66 IN

## 2025-05-07 VITALS — DIASTOLIC BLOOD PRESSURE: 90 MMHG | SYSTOLIC BLOOD PRESSURE: 160 MMHG

## 2025-05-07 DIAGNOSIS — I10 ESSENTIAL (PRIMARY) HYPERTENSION: ICD-10-CM

## 2025-05-07 DIAGNOSIS — I63.9 CEREBRAL INFARCTION, UNSPECIFIED: ICD-10-CM

## 2025-05-07 DIAGNOSIS — I48.0 PAROXYSMAL ATRIAL FIBRILLATION: ICD-10-CM

## 2025-05-07 DIAGNOSIS — E66.9 OBESITY, UNSPECIFIED: ICD-10-CM

## 2025-05-07 PROCEDURE — 99215 OFFICE O/P EST HI 40 MIN: CPT

## 2025-05-07 PROCEDURE — 93000 ELECTROCARDIOGRAM COMPLETE: CPT

## 2025-05-07 RX ORDER — AMLODIPINE BESYLATE 5 MG/1
5 TABLET ORAL DAILY
Qty: 90 | Refills: 3 | Status: ACTIVE | COMMUNITY
Start: 2025-05-07 | End: 1900-01-01

## 2025-05-07 RX ORDER — BLOOD PRESSURE TEST KIT
KIT MISCELLANEOUS
Qty: 1 | Refills: 0 | Status: ACTIVE | COMMUNITY
Start: 2025-05-07 | End: 1900-01-01

## 2025-05-27 ENCOUNTER — APPOINTMENT (OUTPATIENT)
Dept: NEUROLOGY | Facility: CLINIC | Age: 65
End: 2025-05-27
Payer: MEDICARE

## 2025-05-27 VITALS
SYSTOLIC BLOOD PRESSURE: 152 MMHG | HEART RATE: 69 BPM | BODY MASS INDEX: 37.77 KG/M2 | WEIGHT: 235 LBS | OXYGEN SATURATION: 98 % | DIASTOLIC BLOOD PRESSURE: 68 MMHG | HEIGHT: 66 IN

## 2025-05-27 DIAGNOSIS — I67.1 CEREBRAL ANEURYSM, NONRUPTURED: ICD-10-CM

## 2025-05-27 DIAGNOSIS — I63.431 CEREBRAL INFARCTION DUE TO EMBOLISM OF RIGHT POSTERIOR CEREBRAL ARTERY: ICD-10-CM

## 2025-05-27 PROCEDURE — G2211 COMPLEX E/M VISIT ADD ON: CPT

## 2025-05-27 PROCEDURE — 99214 OFFICE O/P EST MOD 30 MIN: CPT

## 2025-06-05 ENCOUNTER — APPOINTMENT (OUTPATIENT)
Dept: CT IMAGING | Facility: CLINIC | Age: 65
End: 2025-06-05

## 2025-06-19 ENCOUNTER — APPOINTMENT (OUTPATIENT)
Dept: CT IMAGING | Facility: CLINIC | Age: 65
End: 2025-06-19

## 2025-07-02 ENCOUNTER — APPOINTMENT (OUTPATIENT)
Dept: CT IMAGING | Facility: CLINIC | Age: 65
End: 2025-07-02
Payer: MEDICARE

## 2025-07-02 ENCOUNTER — OUTPATIENT (OUTPATIENT)
Dept: OUTPATIENT SERVICES | Facility: HOSPITAL | Age: 65
LOS: 1 days | End: 2025-07-02
Payer: MEDICARE

## 2025-07-02 DIAGNOSIS — Z98.890 OTHER SPECIFIED POSTPROCEDURAL STATES: Chronic | ICD-10-CM

## 2025-07-02 DIAGNOSIS — I67.1 CEREBRAL ANEURYSM, NONRUPTURED: ICD-10-CM

## 2025-07-02 DIAGNOSIS — Z92.89 PERSONAL HISTORY OF OTHER MEDICAL TREATMENT: Chronic | ICD-10-CM

## 2025-07-02 DIAGNOSIS — Z98.891 HISTORY OF UTERINE SCAR FROM PREVIOUS SURGERY: Chronic | ICD-10-CM

## 2025-07-02 PROCEDURE — 70496 CT ANGIOGRAPHY HEAD: CPT | Mod: 26

## 2025-07-02 PROCEDURE — 70496 CT ANGIOGRAPHY HEAD: CPT

## 2025-07-04 ENCOUNTER — NON-APPOINTMENT (OUTPATIENT)
Age: 65
End: 2025-07-04

## 2025-07-15 ENCOUNTER — APPOINTMENT (OUTPATIENT)
Dept: NEUROLOGY | Facility: CLINIC | Age: 65
End: 2025-07-15

## 2025-07-15 PROCEDURE — 93886 INTRACRANIAL COMPLETE STUDY: CPT

## 2025-07-15 PROCEDURE — 93892 TCD EMBOLI DETECT W/O INJ: CPT

## 2025-08-22 ENCOUNTER — APPOINTMENT (OUTPATIENT)
Dept: NEUROLOGY | Facility: CLINIC | Age: 65
End: 2025-08-22

## 2025-08-22 VITALS
HEIGHT: 66 IN | WEIGHT: 235 LBS | HEART RATE: 102 BPM | SYSTOLIC BLOOD PRESSURE: 170 MMHG | DIASTOLIC BLOOD PRESSURE: 82 MMHG | OXYGEN SATURATION: 99 % | BODY MASS INDEX: 37.77 KG/M2

## 2025-08-22 DIAGNOSIS — I67.1 CEREBRAL ANEURYSM, NONRUPTURED: ICD-10-CM

## 2025-08-22 DIAGNOSIS — I63.431 CEREBRAL INFARCTION DUE TO EMBOLISM OF RIGHT POSTERIOR CEREBRAL ARTERY: ICD-10-CM

## 2025-08-22 PROCEDURE — 99214 OFFICE O/P EST MOD 30 MIN: CPT

## 2025-08-25 DIAGNOSIS — N60.19 DIFFUSE CYSTIC MASTOPATHY OF UNSPECIFIED BREAST: ICD-10-CM
